# Patient Record
Sex: FEMALE | Race: ASIAN | NOT HISPANIC OR LATINO | Employment: FULL TIME | URBAN - METROPOLITAN AREA
[De-identification: names, ages, dates, MRNs, and addresses within clinical notes are randomized per-mention and may not be internally consistent; named-entity substitution may affect disease eponyms.]

---

## 2017-01-03 ENCOUNTER — TRANSFERRED RECORDS (OUTPATIENT)
Dept: HEALTH INFORMATION MANAGEMENT | Facility: CLINIC | Age: 37
End: 2017-01-03

## 2017-01-10 ENCOUNTER — OFFICE VISIT (OUTPATIENT)
Dept: FAMILY MEDICINE | Facility: CLINIC | Age: 37
End: 2017-01-10
Payer: COMMERCIAL

## 2017-01-10 VITALS
OXYGEN SATURATION: 100 % | BODY MASS INDEX: 22.13 KG/M2 | DIASTOLIC BLOOD PRESSURE: 88 MMHG | WEIGHT: 121 LBS | SYSTOLIC BLOOD PRESSURE: 133 MMHG | HEART RATE: 81 BPM | TEMPERATURE: 97 F

## 2017-01-10 DIAGNOSIS — M76.62 LEFT ACHILLES BURSITIS: Primary | ICD-10-CM

## 2017-01-10 DIAGNOSIS — K85.90 ACUTE PANCREATITIS, UNSPECIFIED COMPLICATION STATUS, UNSPECIFIED PANCREATITIS TYPE: ICD-10-CM

## 2017-01-10 DIAGNOSIS — E78.2 ELEVATED TRIGLYCERIDES WITH HIGH CHOLESTEROL: ICD-10-CM

## 2017-01-10 DIAGNOSIS — Z78.9 ALCOHOL USE: ICD-10-CM

## 2017-01-10 DIAGNOSIS — R07.81 RIB PAIN ON RIGHT SIDE: ICD-10-CM

## 2017-01-10 PROCEDURE — 99214 OFFICE O/P EST MOD 30 MIN: CPT | Performed by: PHYSICIAN ASSISTANT

## 2017-01-10 RX ORDER — OXYCODONE AND ACETAMINOPHEN 10; 325 MG/1; MG/1
1 TABLET ORAL EVERY 4 HOURS PRN
COMMUNITY
End: 2017-01-10

## 2017-01-10 RX ORDER — DICLOFENAC SODIUM 75 MG/1
75 TABLET, DELAYED RELEASE ORAL 2 TIMES DAILY PRN
Qty: 60 TABLET | Refills: 1 | Status: SHIPPED | OUTPATIENT
Start: 2017-01-10 | End: 2017-08-02

## 2017-01-10 RX ORDER — OXYCODONE AND ACETAMINOPHEN 5; 325 MG/1; MG/1
1 TABLET ORAL
Qty: 15 TABLET | Refills: 0 | Status: SHIPPED | OUTPATIENT
Start: 2017-01-10 | End: 2017-03-09

## 2017-01-10 ASSESSMENT — PAIN SCALES - GENERAL: PAINLEVEL: MILD PAIN (3)

## 2017-01-10 NOTE — Clinical Note
[facility]  [address] [facility] [address]  Phone: [phone]    January 10, 2017        Zulay Rossi  [address] [facility] [address]          To whom it may concern:    RE: Zulay Rossi    Patient was seen and treated today at our clinic. She needs to be allowed to wear a boot at all times at work until Jan. 24th if needed.     Please contact me for questions or concerns.      Sincerely,        Dania Munoz PA-C

## 2017-01-10 NOTE — PROGRESS NOTES
SUBJECTIVE:                                                    Zulay Rossi is a 36 year old female who presents to clinic today for the following health issues:    1)  Left foot achilles pain x 2 days. No plantar fascitis symptoms but had that disease on other foot previously.  Has flat arches. Does not wear inserts yet. Gets gout in her right foot infrequently.  Wears good shoes at work. has Never seen podiatry.    No known injury.  Is not a runner.   No edema or erythema. Range of motion of ankle and strength seems to be normal, just pain over achilles.   Tired wearing a boot, made it feel better.  Works as radiation therapist, walks a lot at work.   2) f/u hospital for acute pancreatitis:    [place]-25 [place] 5 days ago for hospital encounter for acute pancreatitis.  Had 4 alcoholic beverages night before and drank the night before that, this in addition to severe high TGs likely caused her first episode of pancreatitis, which patient had been warned about previously.  She also has significant fatty liver disease. She was treated in hospital and sent home and told to obstain for alcohol strictly.  She states she thinks she can do this on her own without help, she was encouraged to let me know at any time if she has difficulty.       Last TGs had improved but still over 600.  Takes fenofibrate.    Started taking more fish oil also since discharge per patient.   Daily smoker.  Was educated about quitting in hospital also.   Took diclofenac once, helped a little bit. Still having abdominal pain but slowly improving.  Feels she still needs pain medications but is out.     Mood has been okay.  Denies suicidal or homicidal thoughts.  Patient instructed to go to the emergency room or call 911 if these occur.      Has been dieting x 3 months, trying to lose weight.    BMI 22.           Problem list and histories reviewed & adjusted, as indicated.  Additional history: as documented    Patient Active Problem  List   Diagnosis     CARDIOVASCULAR SCREENING; LDL GOAL LESS THAN 160     Headache     Goiter, non-toxic     Hypertension goal BP (blood pressure) < 140/90     Acute gouty arthritis     Elevated uric acid in blood     Ovarian cyst     S/P LEEP of cervix     Tobacco abuse     Acute bronchospasm     Episode of recurrent major depressive disorder, unspecified depression episode severity (H)     Benign essential hypertension     Past Surgical History   Procedure Laterality Date     Leep tx, cervical  2003     MILI III     Hc vaginal delivery, no episiotomy/forceps  11/2000     Colonoscopy         Social History   Substance Use Topics     Smoking status: Current Every Day Smoker -- 0.50 packs/day for 15 years     Types: Cigarettes     Smokeless tobacco: Never Used     Alcohol Use: Yes      Comment: occ     Family History   Problem Relation Age of Onset     Unknown/Adopted Mother          Current Outpatient Prescriptions   Medication Sig Dispense Refill     diclofenac (VOLTAREN) 75 MG EC tablet Take 1 tablet (75 mg) by mouth 2 times daily as needed for moderate pain Take with food. For pain/inflammation. 60 tablet 1     oxyCODONE-acetaminophen (PERCOCET) 5-325 MG per tablet Take 1 tablet by mouth nightly as needed for pain 15 tablet 0     fenofibrate 160 MG tablet Take 1 tablet (160 mg) by mouth daily Note increase in dose. For high triglycerides. 30 tablet 3     metroNIDAZOLE (METROGEL) 0.75 % vaginal gel Place 1 applicator vaginally 2 times daily 70 g 0     order for DME Equipment being ordered: Nebulizer and extra tubing and mouthpieces. 1 Device 0     losartan (COZAAR) 50 MG tablet Take 1 tablet (50 mg) by mouth daily 90 tablet 1     amLODIPine (NORVASC) 10 MG tablet Take 1 tablet (10 mg) by mouth daily 90 tablet 1     montelukast (SINGULAIR) 10 MG tablet Take 1 tablet (10 mg) by mouth At Bedtime For cough/allergies 30 tablet 11     albuterol (PROAIR HFA, PROVENTIL HFA, VENTOLIN HFA) 108 (90 BASE) MCG/ACT inhaler  "Inhale 2 puffs into the lungs every 6 hours as needed for shortness of breath / dyspnea or wheezing 1 Inhaler 5     sertraline (ZOLOFT) 100 MG tablet Take 1 tablet (100 mg) by mouth daily 90 tablet 3     cyclobenzaprine (FLEXERIL) 5 MG tablet Take 1 tablet (5 mg) by mouth 3 times daily as needed for muscle spasms Avoid driving or operating machinery while on this medication- has drowsy effects. 42 tablet 0     albuterol (2.5 MG/3ML) 0.083% nebulizer solution Take 1 vial (2.5 mg) by nebulization every 4 hours as needed for shortness of breath / dyspnea or wheezing 60 vial 5     ranitidine (ZANTAC) 150 MG tablet Take  by mouth daily.       [DISCONTINUED] diclofenac (VOLTAREN) 75 MG EC tablet Take 1 tablet (75 mg) by mouth 2 times daily as needed for moderate pain Take with food 30 tablet 0     No Known Allergies    ROS:  Constitutional, HEENT, cardiovascular, pulmonary, gi and gu systems are negative, except as otherwise noted.    OBJECTIVE:                                                    /88 mmHg  Pulse 81  Temp(Src) 97  F (36.1  C) (Oral)  Wt 121 lb (54.885 kg)  SpO2 100%  Body mass index is 22.13 kg/(m^2).  GENERAL: healthy, alert and no distress  RESP: lungs clear to auscultation - no rales, rhonchi or wheezes  CV: regular rate and rhythm, normal S1 S2, no S3 or S4, no murmur, click or rub, no peripheral edema and peripheral pulses strong  ABDOMEN: soft, no rigidity, tender in general  MS: no gross musculoskeletal defects noted, no edema. Left  Achilles-no erythema or edema. Achilles is intact with richardson test. Range of motion intact at ankle. Tender left achilles.  \"feels deep per patient\". Distal sensation and pulses intact in foot.  Toes normal. Arches are flat.   NEURO: Normal strength and tone, mentation intact and speech normal  PSYCH: mentation appears normal, affect normal/bright         ASSESSMENT/PLAN:                                                    ASSESSMENT / PLAN:  (M76.62) Left " Achilles bursitis  (primary encounter diagnosis)  Comment: see below for plan  Plan: PODIATRY/FOOT & ANKLE SURGERY REFERRAL            (E78.2) Elevated triglycerides with high cholesterol  Comment:   Plan: Lipid panel reflex to direct LDL        See below    (K85.90) Acute pancreatitis, unspecified complication status, unspecified pancreatitis type  Comment: discussed she is at risk for another event, especially if she does not avoid alcohol and if we are not improving her TGS.   Plan: oxyCODONE-acetaminophen (PERCOCET) 5-325 MG per        tablet          See below                Patient Instructions   Schedule with podiatry  Wear boot as much as possible  Take diclofenac for at least 5 days or longer if needed  Avoid alcohol, please let me know at any time if this is difficult for you and I can place referrals  Lets recheck fasting labs in about 1 month, this can be lab only appointment    -Narcotic medications can be addicting and therefore are used for short term pain control only.  They are not intended for long-term use.    -Take them only for severe pain as needed.    -Never mix with alcohol.    -Do not drive after taking this medication.    -No refills without an office visit. No replacements will be given.    -Keep these medications kept in a safe location.  You are responsible for them.  -Do not give them to anyone else.  They are prescribed to you only.           Dania Munoz PA-C  [facility]

## 2017-01-10 NOTE — NURSING NOTE
"Chief Complaint   Patient presents with     Musculoskeletal Problem     left foot pain - x 2 days  (was in hosp for pancreatitis)       Initial /88 mmHg  Pulse 81  Temp(Src) 97  F (36.1  C) (Oral)  Wt 121 lb (54.885 kg)  SpO2 100% Estimated body mass index is 22.13 kg/(m^2) as calculated from the following:    Height as of 10/19/16: 5' 2\" (1.575 m).    Weight as of this encounter: 121 lb (54.885 kg)..  BP completed using cuff size: ralph Castillo M.A.      "

## 2017-01-10 NOTE — MR AVS SNAPSHOT
After Visit Summary   1/10/2017    Zulay Rossi    MRN: [id number]           Patient Information     Date Of Birth          1980        Visit Information        Provider Department      1/10/2017 11:00 AM Dania Munoz PA-C [facility]        Today's Diagnoses     Left Achilles bursitis    -  1     Elevated triglycerides with high cholesterol         Acute pancreatitis, unspecified complication status, unspecified pancreatitis type         Rib pain on right side           Care Instructions    Schedule with podiatry  Wear boot as much as possible  Take diclofenac for at least 5 days or longer if needed  Avoid alcohol, please let me know at any time if this is difficult for you and I can place referrals  Lets recheck fasting labs in about 1 month, this can be lab only appointment    -Narcotic medications can be addicting and therefore are used for short term pain control only.  They are not intended for long-term use.    -Take them only for severe pain as needed.    -Never mix with alcohol.    -Do not drive after taking this medication.    -No refills without an office visit. No replacements will be given.    -Keep these medications kept in a safe location.  You are responsible for them.  -Do not give them to anyone else.  They are prescribed to you only.           Follow-ups after your visit        Additional Services     PODIATRY/FOOT & ANKLE SURGERY REFERRAL       Your provider has referred you to: [facility]: [facility] [phone]   [email or web address]    Please be aware that coverage of these services is subject to the terms and limitations of your health insurance plan.  Call member services at your health plan with any benefit or coverage questions.      Please bring the following to your appointment:  >>   Any x-rays, CTs or MRIs which have been performed.  Contact the facility where they were done to arrange for   prior to your scheduled appointment.    >>   List of current medications   >>   This referral request   >>   Any documents/labs given to you for this referral                  Future tests that were ordered for you today     Open Future Orders        Priority Expected Expires Ordered    Lipid panel reflex to direct LDL Routine 2/10/2017 1/10/2018 1/10/2017            Who to contact     If you have questions or need follow up information about today's clinic visit or your schedule please contact [facility] directly at [phone].  Normal or non-critical lab and imaging results will be communicated to you by Huddlehart, letter or phone within 4 business days after the clinic has received the results. If you do not hear from us within 7 days, please contact the clinic through Huddlert or phone. If you have a critical or abnormal lab result, we will notify you by phone as soon as possible.  Submit refill requests through [facility] or call your pharmacy and they will forward the refill request to us. Please allow 3 business days for your refill to be completed.          Additional Information About Your Visit        Huddlehart Information     [facility] gives you secure access to your electronic health record. If you see a primary care provider, you can also send messages to your care team and make appointments. If you have questions, please call your primary care clinic.  If you do not have a primary care provider, please call [phone] and they will assist you.        Care EveryWhere ID     This is your Care EveryWhere ID. This could be used by other organizations to access your [facility] medical records  [id number]        Your Vitals Were     Pulse Temperature Pulse Oximetry             81 97  F (36.1  C) (Oral) 100%          Blood Pressure from Last 3 Encounters:   01/10/17 133/88   10/19/16 124/78   09/02/16 132/84    Weight from Last 3 Encounters:   01/10/17 121 lb (54.885 kg)   10/19/16 132 lb (59.875 kg)    09/02/16 138 lb (62.596 kg)              We Performed the Following     PODIATRY/FOOT & ANKLE SURGERY REFERRAL          Today's Medication Changes          These changes are accurate as of: 1/10/17 11:37 AM.  If you have any questions, ask your nurse or doctor.               Start taking these medicines.        Dose/Directions    diclofenac 75 MG EC tablet   Commonly known as:  VOLTAREN   Used for:  Rib pain on right side        Dose:  75 mg   Take 1 tablet (75 mg) by mouth 2 times daily as needed for moderate pain Take with food. For pain/inflammation.   Quantity:  60 tablet   Refills:  1       oxyCODONE-acetaminophen 5-325 MG per tablet   Commonly known as:  PERCOCET   Used for:  Acute pancreatitis, unspecified complication status, unspecified pancreatitis type   Replaces:  oxyCODONE-acetaminophen  MG per tablet        Dose:  1 tablet   Take 1 tablet by mouth nightly as needed for pain   Quantity:  15 tablet   Refills:  0         Stop taking these medicines if you haven't already. Please contact your care team if you have questions.     oxyCODONE-acetaminophen  MG per tablet   Commonly known as:  PERCOCET   Replaced by:  oxyCODONE-acetaminophen 5-325 MG per tablet                Where to get your medicines      These medications were sent to [facility]/pharmacy #[id number] - [place], [place] - [address] [place] [address],  AT [address], , [facility] [address]     Phone:  [phone]    - diclofenac 75 MG EC tablet      Some of these will need a paper prescription and others can be bought over the counter.  Ask your nurse if you have questions.     Bring a paper prescription for each of these medications    - oxyCODONE-acetaminophen 5-325 MG per tablet             Primary Care Provider Office Phone # Fax #    Dania Munoz PA-C [phone]       [facility] [address] [facility] [address]        Thank you!     Thank you for choosing  [facility]  for your care. Our goal is always to provide you with excellent care. Hearing back from our patients is one way we can continue to improve our services. Please take a few minutes to complete the written survey that you may receive in the mail after your visit with us. Thank you!             Your Updated Medication List - Protect others around you: Learn how to safely use, store and throw away your medicines at www.disposemymeds.org.          This list is accurate as of: 1/10/17 11:37 AM.  Always use your most recent med list.                   Brand Name Dispense Instructions for use    * albuterol (2.5 MG/3ML) 0.083% neb solution     60 vial    Take 1 vial (2.5 mg) by nebulization every 4 hours as needed for shortness of breath / dyspnea or wheezing       * albuterol 108 (90 BASE) MCG/ACT Inhaler    PROAIR HFA/PROVENTIL HFA/VENTOLIN HFA    1 Inhaler    Inhale 2 puffs into the lungs every 6 hours as needed for shortness of breath / dyspnea or wheezing       amLODIPine 10 MG tablet    NORVASC    90 tablet    Take 1 tablet (10 mg) by mouth daily       cyclobenzaprine 5 MG tablet    FLEXERIL    42 tablet    Take 1 tablet (5 mg) by mouth 3 times daily as needed for muscle spasms Avoid driving or operating machinery while on this medication- has drowsy effects.       diclofenac 75 MG EC tablet    VOLTAREN    60 tablet    Take 1 tablet (75 mg) by mouth 2 times daily as needed for moderate pain Take with food. For pain/inflammation.       fenofibrate 160 MG tablet     30 tablet    Take 1 tablet (160 mg) by mouth daily Note increase in dose. For high triglycerides.       losartan 50 MG tablet    COZAAR    90 tablet    Take 1 tablet (50 mg) by mouth daily       metroNIDAZOLE 0.75 % vaginal gel    METROGEL    70 g    Place 1 applicator vaginally 2 times daily       montelukast 10 MG tablet    SINGULAIR    30 tablet    Take 1 tablet (10 mg) by mouth At Bedtime For cough/allergies       order for DME      1 Device    Equipment being ordered: Nebulizer and extra tubing and mouthpieces.       oxyCODONE-acetaminophen 5-325 MG per tablet    PERCOCET    15 tablet    Take 1 tablet by mouth nightly as needed for pain       sertraline 100 MG tablet    ZOLOFT    90 tablet    Take 1 tablet (100 mg) by mouth daily       ZANTAC 150 MG tablet   Generic drug:  ranitidine      Take  by mouth daily.       * Notice:  This list has 2 medication(s) that are the same as other medications prescribed for you. Read the directions carefully, and ask your doctor or other care provider to review them with you.

## 2017-01-10 NOTE — PATIENT INSTRUCTIONS
Schedule with podiatry  Wear boot as much as possible  Take diclofenac for at least 5 days or longer if needed  Avoid alcohol, please let me know at any time if this is difficult for you and I can place referrals  Lets recheck fasting labs in about 1 month, this can be lab only appointment    -Narcotic medications can be addicting and therefore are used for short term pain control only.  They are not intended for long-term use.    -Take them only for severe pain as needed.    -Never mix with alcohol.    -Do not drive after taking this medication.    -No refills without an office visit. No replacements will be given.    -Keep these medications kept in a safe location.  You are responsible for them.  -Do not give them to anyone else.  They are prescribed to you only.

## 2017-01-27 ENCOUNTER — OFFICE VISIT (OUTPATIENT)
Dept: PODIATRY | Facility: CLINIC | Age: 37
End: 2017-01-27
Payer: COMMERCIAL

## 2017-01-27 VITALS — HEIGHT: 62 IN | OXYGEN SATURATION: 98 % | HEART RATE: 90 BPM

## 2017-01-27 DIAGNOSIS — M10.9 ACUTE GOUT OF LEFT ANKLE, UNSPECIFIED CAUSE: ICD-10-CM

## 2017-01-27 DIAGNOSIS — M76.62 ACHILLES TENDINITIS OF LEFT LOWER EXTREMITY: Primary | ICD-10-CM

## 2017-01-27 PROCEDURE — 99213 OFFICE O/P EST LOW 20 MIN: CPT | Performed by: PODIATRIST

## 2017-01-27 NOTE — Clinical Note
[address] [address]  Phone: [phone]    January 27, 2017        Zulay Rossi  [facility] [address] [address]          To whom it may concern:    RE: Zulay Rossi    Patient was seen and treated today at our clinic and missed work. Jan 26,27 2017.   No Work Due to Gout and tendonitis.     Patient may return to work 1/30/17 with the following:  No working or lifting restrictions    Please contact me for questions or concerns.      Sincerely,            Shiva Qiu DPM

## 2017-01-27 NOTE — NURSING NOTE
"Chief Complaint   Patient presents with     Foot Pain     Left achilles x 2weeks       Initial Pulse 90  Ht 1.575 m (5' 2\")  SpO2 98% Estimated body mass index is 22.13 kg/(m^2) as calculated from the following:    Height as of this encounter: 1.575 m (5' 2\").    Weight as of 1/10/17: 54.885 kg (121 lb).  BP completed using cuff size: NA (Not Taken)  "
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
## 2017-01-27 NOTE — PATIENT INSTRUCTIONS
Smoking Cessation    What's in cigarette smoke? - Cigarette smoke contains over 4,000 chemicals. Nicotine is one of the main ingredients which is an insecticide/herbicide. It is poisonous to our nervous system, increases blood clotting risk, and decreases the body's defenses to fight off infection. Another chemical is Carbon Monoxide is an asphyxiating gas that permanently binds to blood cells and blocks the transport of oxygen. This leads to tissue death and decreases your metabolism. Tar is a chemical that coats your lungs and trachea which impairs new oxygen coming in and carbon dioxide getting out of your body.   How does smoking impact surgery? - Smoking is particularly hazardous with regards to surgery. Surgery puts stress on the body and a smoker's body is already under strain from these chemicals. Putting the two together, especially for an elective surgery, could be a recipe for disaster. Smoking before and after surgery increases your risk of heart problems, slow wound healing, delayed bone healing, blood clots, wound infection and anesthesia complications.   What are the benefits of quitting? - In 20 minutes your blood pressure will drop back down to normal. In 8 hours the carbon monoxide (a toxic gas) levels in your blood stream will drop by half, and oxygen levels will return to normal. In 48 hours your chance of having a heart attack will have decreased. All nicotine will have left your body. Your sense of taste and smell will return to a normal level. In 72 hours your bronchial tubes will relax, and your energy levels will increase. In 2 weeks your circulation will increase, and it will continue to improve for the next 10 weeks.    Recommendations for elective surgery - Ideally, patients should quit smoking 8 weeks before and at least 2 weeks after elective surgery in order to avoid complications. Simply cutting back on the amount of cigarettes smoked per day does not offer any benefit or decrease the  risk of poor wound healing, heart problems, and infection. Smokers should also start taking Vitamin C and B for two weeks before surgery and two weeks after surgery.    Ways to Stop Smokin. Nicotine patches, lozenges, or gum  2. Support Groups  3. Medications (see below)    List of Medications:  1. Varenicline Tartrate (CHANTIX)   2. Bupropion HCL (WELLBUTRIN, ZYBAN)   note: make sure Wellbutrin is for smoking cessation and not other issues   3. Nicotine Patch (NICODERM)   4. Nicotine Inhaler (NICOTROL)   5. Nicotine Gum Nicotine Polacrilex   6. Nicotine Lozenge: Nicotine Polacrilex (COMMIT)   * [employer] offers a smoking support group as well!  Please visit: [email or web address]  If you are interested in these, ask about getting a prescription or talk to your primary care doctor about what may be the best way for you to quit.          Weight management plan: Patient was referred to their PCP to discuss a diet and exercise plan.

## 2017-02-21 ENCOUNTER — TRANSFERRED RECORDS (OUTPATIENT)
Dept: HEALTH INFORMATION MANAGEMENT | Facility: CLINIC | Age: 37
End: 2017-02-21

## 2017-03-08 NOTE — PROGRESS NOTES
SUBJECTIVE:                                                    Zulay Rossi is a 36 year old female who presents to clinic today for the following health issues:        Hospital Follow-up Visit:    Hospital/Nursing Home/IP Rehab Facility: [facility]  Date of Admission: 02/21/2017  Date of Discharge: 02/23/2017  Reason(s) for Admission: Abdominal pain per pt and was DX with pancreatitis            Problems taking medications regularly:  None       Medication changes since discharge: YES       Problems adhering to non-medication therapy:  None      Had been taking her fibrate medication, they increased it when she was discharged to 200 mg daily, she has tolerated this well. History of severe high TGs.  Has not had alcohol since her last episode of pancreatitis which was thought to also be induced from that.  However this time was not a cause.  Unknown FH because she was adopted.   Last episode of pancreatitis was this January.     Her TGs in the hospital were over 1,000 (was not fasting for this).   Was treated with liquids and pain medications.  No intervention was done.   Went back in Saturday to emergency room with similar symptoms but labs looked good then.   gastroenterology---has an appt April 12th.  At [facility].   Did have diarrhea this weekend so possibly a stomach bug on top of everything. No fevers.   Statin-has never been on.   No muscle aches from the fibrate.     Still taking pain medications, [place] registry- fills around only when she has episodes of pancreatitis. Last fill she received 20 tabs on 3-4-17.  she is aware of the risks of this medication including addiction and overdose.      Takes 1 tablet of percocet at night.   Has been on full liquid diet since her discharge.   Pain is worse at night. This helps her through night.   Still has referral for nutritionist, she will schedule.     No vomiting anymore, was vomiting a lot this weekend.  Using zofran PRN still.     Summary of  hospitalization:  CareEverywhere information obtained and reviewed  Diagnostic Tests/Treatments reviewed.  Follow up needed: none  Other Healthcare Providers Involved in Patient s Care:         None  Update since discharge: stable.     Post Discharge Medication Reconciliation: discharge medications reconciled, continue medications without change.  Plan of care communicated with patient     Coding guidelines for this visit:  Type of Medical   Decision Making Face-to-Face Visit       within 7 Days of discharge Face-to-Face Visit        within 14 days of discharge   Moderate Complexity 52026 12413   High Complexity 14693 68414            Problem list and histories reviewed & adjusted, as indicated.  Additional history: as documented    Patient Active Problem List   Diagnosis     CARDIOVASCULAR SCREENING; LDL GOAL LESS THAN 160     Headache     Goiter, non-toxic     Hypertension goal BP (blood pressure) < 140/90     Acute gouty arthritis     Elevated uric acid in blood     Ovarian cyst     S/P LEEP of cervix     Tobacco abuse     Acute bronchospasm     Episode of recurrent major depressive disorder, unspecified depression episode severity (H)     Benign essential hypertension     Past Surgical History   Procedure Laterality Date     Leep tx, cervical  2003     MILI III     Hc vaginal delivery, no episiotomy/forceps  11/2000     Colonoscopy         Social History   Substance Use Topics     Smoking status: Current Every Day Smoker     Packs/day: 0.50     Years: 15.00     Types: Cigarettes     Smokeless tobacco: Never Used     Alcohol use Yes      Comment: occ     Family History   Problem Relation Age of Onset     Unknown/Adopted Mother          Current Outpatient Prescriptions   Medication Sig Dispense Refill     potassium chloride (KLOR-CON) 20 MEQ Packet Take 20 mEq by mouth 2 times daily       oxyCODONE-acetaminophen (PERCOCET) 5-325 MG per tablet Take 1 tablet by mouth nightly as needed for pain 20 tablet 0      fenofibrate micronized (LOFIBRA) 200 MG capsule Take 1 capsule (200 mg) by mouth every morning (before breakfast) 90 capsule 1     atorvastatin (LIPITOR) 20 MG tablet Take 1 tablet (20 mg) by mouth daily 30 tablet 2     ondansetron (ZOFRAN) 8 MG tablet Take 1 tablet (8 mg) by mouth every 8 hours as needed for nausea 18 tablet 3     diclofenac (VOLTAREN) 75 MG EC tablet Take 1 tablet (75 mg) by mouth 2 times daily as needed for moderate pain Take with food. For pain/inflammation. 60 tablet 1     metroNIDAZOLE (METROGEL) 0.75 % vaginal gel Place 1 applicator vaginally 2 times daily 70 g 0     order for DME Equipment being ordered: Nebulizer and extra tubing and mouthpieces. 1 Device 0     losartan (COZAAR) 50 MG tablet Take 1 tablet (50 mg) by mouth daily 90 tablet 1     amLODIPine (NORVASC) 10 MG tablet Take 1 tablet (10 mg) by mouth daily 90 tablet 1     montelukast (SINGULAIR) 10 MG tablet Take 1 tablet (10 mg) by mouth At Bedtime For cough/allergies 30 tablet 11     albuterol (PROAIR HFA, PROVENTIL HFA, VENTOLIN HFA) 108 (90 BASE) MCG/ACT inhaler Inhale 2 puffs into the lungs every 6 hours as needed for shortness of breath / dyspnea or wheezing 1 Inhaler 5     sertraline (ZOLOFT) 100 MG tablet Take 1 tablet (100 mg) by mouth daily 90 tablet 3     cyclobenzaprine (FLEXERIL) 5 MG tablet Take 1 tablet (5 mg) by mouth 3 times daily as needed for muscle spasms Avoid driving or operating machinery while on this medication- has drowsy effects. 42 tablet 0     albuterol (2.5 MG/3ML) 0.083% nebulizer solution Take 1 vial (2.5 mg) by nebulization every 4 hours as needed for shortness of breath / dyspnea or wheezing 60 vial 5     ranitidine (ZANTAC) 150 MG tablet Take  by mouth daily.       [DISCONTINUED] fenofibrate micronized (LOFIBRA) 200 MG capsule Take 200 mg by mouth       No Known Allergies    ROS:  Constitutional, HEENT, cardiovascular, pulmonary, gi and gu systems are negative, except as otherwise  noted.    OBJECTIVE:                                                    /75  Pulse 86  Temp 98.4  F (36.9  C) (Oral)  Wt 126 lb (57.2 kg)  SpO2 100%  Breastfeeding? No  BMI 23.05 kg/m2  Body mass index is 23.05 kg/(m^2).  GENERAL: healthy, alert and no distress  RESP: lungs clear to auscultation - no rales, rhonchi or wheezes  CV: regular rate and rhythm, normal S1 S2, no S3 or S4, no murmur, click or rub, no peripheral edema and peripheral pulses strong  ABDOMEN: soft, tender epigastric region somewhat, no hepatosplenomegaly, no masses and bowel sounds normal  MS: no gross musculoskeletal defects noted, no edema  PSYCH: mentation appears normal, affect normal/bright         ASSESSMENT/PLAN:                                                    ASSESSMENT / PLAN:  (E78.1) Hypertriglyceridemia  (primary encounter diagnosis)  Comment:   Plan: atorvastatin (LIPITOR) 20 MG tablet        Continue fibrate, add lipitor, monitor for muscle aches we discussed  Recheck lipids in 2 months    (K85.90) Acute pancreatitis, unspecified complication status, unspecified pancreatitis type  Comment:   Plan: oxyCODONE-acetaminophen (PERCOCET) 5-325 MG per        tablet, fenofibrate micronized (LOFIBRA) 200 MG        capsule        Advance to normal diet slowly and as tolerated      See below for more plan, will meet with nutritionist and gastroenterology     (R11.0) Nausea  Comment:   Plan: ondansetron (ZOFRAN) 8 MG tablet            Patient Instructions   Recheck cholesterol in about 1-2 months (lab only appointment) fasting  Start statin medication  Schedule with nutritionist  Keep appt with gi  Use zofran      -Narcotic medications can be addicting and therefore are used for short term pain control only.  They are not intended for long-term use.    -Take them only for severe pain as needed.    -Never mix with alcohol.    -Do not drive after taking this medication.    -No refills without an office visit. No replacements will be  given.    -Keep these medications kept in a safe location.  You are responsible for them.  -Do not give them to anyone else.  They are prescribed to you only.           Dania Munoz PA-C  [facility]

## 2017-03-09 ENCOUNTER — OFFICE VISIT (OUTPATIENT)
Dept: FAMILY MEDICINE | Facility: CLINIC | Age: 37
End: 2017-03-09
Payer: COMMERCIAL

## 2017-03-09 VITALS
OXYGEN SATURATION: 100 % | HEART RATE: 86 BPM | BODY MASS INDEX: 23.05 KG/M2 | SYSTOLIC BLOOD PRESSURE: 119 MMHG | WEIGHT: 126 LBS | DIASTOLIC BLOOD PRESSURE: 75 MMHG | TEMPERATURE: 98.4 F

## 2017-03-09 DIAGNOSIS — E78.1 HYPERTRIGLYCERIDEMIA: Primary | ICD-10-CM

## 2017-03-09 DIAGNOSIS — R11.0 NAUSEA: ICD-10-CM

## 2017-03-09 DIAGNOSIS — K85.90 ACUTE PANCREATITIS, UNSPECIFIED COMPLICATION STATUS, UNSPECIFIED PANCREATITIS TYPE: ICD-10-CM

## 2017-03-09 PROCEDURE — 99214 OFFICE O/P EST MOD 30 MIN: CPT | Performed by: PHYSICIAN ASSISTANT

## 2017-03-09 RX ORDER — POTASSIUM CHLORIDE 1.5 G/1.58G
20 POWDER, FOR SOLUTION ORAL 2 TIMES DAILY
COMMUNITY
End: 2017-06-02

## 2017-03-09 RX ORDER — OXYCODONE AND ACETAMINOPHEN 5; 325 MG/1; MG/1
1 TABLET ORAL
Qty: 20 TABLET | Refills: 0 | Status: SHIPPED | OUTPATIENT
Start: 2017-03-09 | End: 2017-04-18

## 2017-03-09 RX ORDER — FENOFIBRATE 200 MG/1
200 CAPSULE ORAL
COMMUNITY
Start: 2017-02-23 | End: 2017-03-09

## 2017-03-09 RX ORDER — ATORVASTATIN CALCIUM 20 MG/1
20 TABLET, FILM COATED ORAL DAILY
Qty: 30 TABLET | Refills: 2 | Status: SHIPPED | OUTPATIENT
Start: 2017-03-09 | End: 2017-04-18

## 2017-03-09 RX ORDER — FENOFIBRATE 200 MG/1
200 CAPSULE ORAL
Qty: 90 CAPSULE | Refills: 1 | Status: SHIPPED | OUTPATIENT
Start: 2017-03-09 | End: 2020-10-07

## 2017-03-09 RX ORDER — ONDANSETRON 8 MG/1
TABLET, FILM COATED ORAL EVERY 8 HOURS PRN
COMMUNITY
End: 2017-03-09

## 2017-03-09 RX ORDER — ONDANSETRON 8 MG/1
8 TABLET, FILM COATED ORAL EVERY 8 HOURS PRN
Qty: 18 TABLET | Refills: 3 | Status: SHIPPED | OUTPATIENT
Start: 2017-03-09 | End: 2017-10-17

## 2017-03-09 ASSESSMENT — ANXIETY QUESTIONNAIRES
3. WORRYING TOO MUCH ABOUT DIFFERENT THINGS: NOT AT ALL
2. NOT BEING ABLE TO STOP OR CONTROL WORRYING: NOT AT ALL
1. FEELING NERVOUS, ANXIOUS, OR ON EDGE: NOT AT ALL
5. BEING SO RESTLESS THAT IT IS HARD TO SIT STILL: NOT AT ALL
IF YOU CHECKED OFF ANY PROBLEMS ON THIS QUESTIONNAIRE, HOW DIFFICULT HAVE THESE PROBLEMS MADE IT FOR YOU TO DO YOUR WORK, TAKE CARE OF THINGS AT HOME, OR GET ALONG WITH OTHER PEOPLE: NOT DIFFICULT AT ALL
GAD7 TOTAL SCORE: 0
7. FEELING AFRAID AS IF SOMETHING AWFUL MIGHT HAPPEN: NOT AT ALL
6. BECOMING EASILY ANNOYED OR IRRITABLE: NOT AT ALL

## 2017-03-09 ASSESSMENT — PATIENT HEALTH QUESTIONNAIRE - PHQ9: 5. POOR APPETITE OR OVEREATING: NOT AT ALL

## 2017-03-09 NOTE — NURSING NOTE
"Chief Complaint   Patient presents with     Hospital F/U     [facility] F/U for pancreas       Initial /75  Pulse 86  Temp 98.4  F (36.9  C) (Oral)  Wt 126 lb (57.2 kg)  SpO2 100%  Breastfeeding? No  BMI 23.05 kg/m2 Estimated body mass index is 23.05 kg/(m^2) as calculated from the following:    Height as of 1/27/17: 5' 2\" (1.575 m).    Weight as of this encounter: 126 lb (57.2 kg).  Medication Reconciliation: complete      Carlos Manuel Hoorwitz MA    "

## 2017-03-09 NOTE — PATIENT INSTRUCTIONS
Recheck cholesterol in about 1-2 months (lab only appointment) fasting  Start statin medication  Schedule with nutritionist  Keep appt with gi  Use zofran      -Narcotic medications can be addicting and therefore are used for short term pain control only.  They are not intended for long-term use.    -Take them only for severe pain as needed.    -Never mix with alcohol.    -Do not drive after taking this medication.    -No refills without an office visit. No replacements will be given.    -Keep these medications kept in a safe location.  You are responsible for them.  -Do not give them to anyone else.  They are prescribed to you only.

## 2017-03-09 NOTE — MR AVS SNAPSHOT
After Visit Summary   3/9/2017    Zulay Rossi    MRN: [id number]           Patient Information     Date Of Birth          1980        Visit Information        Provider Department      3/9/2017 1:20 PM Dania Munoz PA-C [facility]        Today's Diagnoses     Hypertriglyceridemia    -  1    Acute pancreatitis, unspecified complication status, unspecified pancreatitis type        Nausea          Care Instructions    Recheck cholesterol in about 1-2 months (lab only appointment) fasting  Start statin medication  Schedule with nutritionist  Keep appt with gi  Use zofran      -Narcotic medications can be addicting and therefore are used for short term pain control only.  They are not intended for long-term use.    -Take them only for severe pain as needed.    -Never mix with alcohol.    -Do not drive after taking this medication.    -No refills without an office visit. No replacements will be given.    -Keep these medications kept in a safe location.  You are responsible for them.  -Do not give them to anyone else.  They are prescribed to you only.           Follow-ups after your visit        Who to contact     If you have questions or need follow up information about today's clinic visit or your schedule please contact [facility] directly at [phone].  Normal or non-critical lab and imaging results will be communicated to you by MyChart, letter or phone within 4 business days after the clinic has received the results. If you do not hear from us within 7 days, please contact the clinic through MyChart or phone. If you have a critical or abnormal lab result, we will notify you by phone as soon as possible.  Submit refill requests through [employer] or call your pharmacy and they will forward the refill request to us. Please allow 3 business days for your refill to be completed.          Additional Information About Your Visit        MyChart Information      [employer] gives you secure access to your electronic health record. If you see a primary care provider, you can also send messages to your care team and make appointments. If you have questions, please call your primary care clinic.  If you do not have a primary care provider, please call [phone] and they will assist you.        Care EveryWhere ID     This is your Care EveryWhere ID. This could be used by other organizations to access your [place] medical records  [id number]        Your Vitals Were     Pulse Temperature Pulse Oximetry Breastfeeding? BMI (Body Mass Index)       86 98.4  F (36.9  C) (Oral) 100% No 23.05 kg/m2        Blood Pressure from Last 3 Encounters:   03/09/17 119/75   01/10/17 133/88   10/19/16 124/78    Weight from Last 3 Encounters:   03/09/17 126 lb (57.2 kg)   01/10/17 121 lb (54.9 kg)   10/19/16 132 lb (59.9 kg)              We Performed the Following     DEPRESSION ACTION PLAN (DAP)          Today's Medication Changes          These changes are accurate as of: 3/9/17  1:56 PM.  If you have any questions, ask your nurse or doctor.               Start taking these medicines.        Dose/Directions    atorvastatin 20 MG tablet   Commonly known as:  LIPITOR   Used for:  Hypertriglyceridemia   Started by:  Dania Munoz PA-C        Dose:  20 mg   Take 1 tablet (20 mg) by mouth daily   Quantity:  30 tablet   Refills:  2         These medicines have changed or have updated prescriptions.        Dose/Directions    fenofibrate micronized 200 MG capsule   Commonly known as:  LOFIBRA   This may have changed:  when to take this   Used for:  Acute pancreatitis, unspecified complication status, unspecified pancreatitis type   Changed by:  Dania Munoz PA-C        Dose:  200 mg   Take 1 capsule (200 mg) by mouth every morning (before breakfast)   Quantity:  90 capsule   Refills:  1       ondansetron 8 MG tablet   Commonly known as:  ZOFRAN   This may have changed:  how much  to take   Used for:  Nausea   Changed by:  Dania Munoz PA-C        Dose:  8 mg   Take 1 tablet (8 mg) by mouth every 8 hours as needed for nausea   Quantity:  18 tablet   Refills:  3         Stop taking these medicines if you haven't already. Please contact your care team if you have questions.     fenofibrate 160 MG tablet   Stopped by:  Dania Munoz PA-C           FENOFIBRATE PO   Stopped by:  Dania Munoz PA-C                Where to get your medicines      These medications were sent to [facility]/pharmacy #[id number] - [facility] [address],  AT [address] OF [address], , [facility] [address]     Phone:  [phone]     atorvastatin 20 MG tablet    fenofibrate micronized 200 MG capsule    ondansetron 8 MG tablet         Some of these will need a paper prescription and others can be bought over the counter.  Ask your nurse if you have questions.     Bring a paper prescription for each of these medications     oxyCODONE-acetaminophen 5-325 MG per tablet                Primary Care Provider Office Phone # Fax #    Dania Munzo PA-C [phone]       [address] [address]        Thank you!     Thank you for choosing [facility]  for your care. Our goal is always to provide you with excellent care. Hearing back from our patients is one way we can continue to improve our services. Please take a few minutes to complete the written survey that you may receive in the mail after your visit with us. Thank you!             Your Updated Medication List - Protect others around you: Learn how to safely use, store and throw away your medicines at www.disposemymeds.org.          This list is accurate as of: 3/9/17  1:56 PM.  Always use your most recent med list.                   Brand Name Dispense Instructions for use    * albuterol (2.5 MG/3ML) 0.083% neb solution     60  vial    Take 1 vial (2.5 mg) by nebulization every 4 hours as needed for shortness of breath / dyspnea or wheezing       * albuterol 108 (90 BASE) MCG/ACT Inhaler    PROAIR HFA/PROVENTIL HFA/VENTOLIN HFA    1 Inhaler    Inhale 2 puffs into the lungs every 6 hours as needed for shortness of breath / dyspnea or wheezing       amLODIPine 10 MG tablet    NORVASC    90 tablet    Take 1 tablet (10 mg) by mouth daily       atorvastatin 20 MG tablet    LIPITOR    30 tablet    Take 1 tablet (20 mg) by mouth daily       cyclobenzaprine 5 MG tablet    FLEXERIL    42 tablet    Take 1 tablet (5 mg) by mouth 3 times daily as needed for muscle spasms Avoid driving or operating machinery while on this medication- has drowsy effects.       diclofenac 75 MG EC tablet    VOLTAREN    60 tablet    Take 1 tablet (75 mg) by mouth 2 times daily as needed for moderate pain Take with food. For pain/inflammation.       fenofibrate micronized 200 MG capsule    LOFIBRA    90 capsule    Take 1 capsule (200 mg) by mouth every morning (before breakfast)       losartan 50 MG tablet    COZAAR    90 tablet    Take 1 tablet (50 mg) by mouth daily       metroNIDAZOLE 0.75 % vaginal gel    METROGEL    70 g    Place 1 applicator vaginally 2 times daily       montelukast 10 MG tablet    SINGULAIR    30 tablet    Take 1 tablet (10 mg) by mouth At Bedtime For cough/allergies       ondansetron 8 MG tablet    ZOFRAN    18 tablet    Take 1 tablet (8 mg) by mouth every 8 hours as needed for nausea       order for DME     1 Device    Equipment being ordered: Nebulizer and extra tubing and mouthpieces.       oxyCODONE-acetaminophen 5-325 MG per tablet    PERCOCET    20 tablet    Take 1 tablet by mouth nightly as needed for pain       potassium chloride 20 MEQ Packet    KLOR-CON     Take 20 mEq by mouth 2 times daily       sertraline 100 MG tablet    ZOLOFT    90 tablet    Take 1 tablet (100 mg) by mouth daily       ZANTAC 150 MG tablet   Generic drug:  ranitidine       Take  by mouth daily.       * Notice:  This list has 2 medication(s) that are the same as other medications prescribed for you. Read the directions carefully, and ask your doctor or other care provider to review them with you.

## 2017-03-10 ASSESSMENT — ANXIETY QUESTIONNAIRES: GAD7 TOTAL SCORE: 0

## 2017-03-10 ASSESSMENT — PATIENT HEALTH QUESTIONNAIRE - PHQ9: SUM OF ALL RESPONSES TO PHQ QUESTIONS 1-9: 0

## 2017-04-07 DIAGNOSIS — E78.2 ELEVATED TRIGLYCERIDES WITH HIGH CHOLESTEROL: ICD-10-CM

## 2017-04-07 LAB
CHOLEST SERPL-MCNC: 142 MG/DL
HDLC SERPL-MCNC: 74 MG/DL
LDLC SERPL CALC-MCNC: ABNORMAL MG/DL
LDLC SERPL DIRECT ASSAY-MCNC: 50 MG/DL
NONHDLC SERPL-MCNC: 68 MG/DL
TRIGL SERPL-MCNC: 439 MG/DL

## 2017-04-07 PROCEDURE — 36415 COLL VENOUS BLD VENIPUNCTURE: CPT | Performed by: PHYSICIAN ASSISTANT

## 2017-04-07 PROCEDURE — 80061 LIPID PANEL: CPT | Performed by: PHYSICIAN ASSISTANT

## 2017-04-07 PROCEDURE — 83721 ASSAY OF BLOOD LIPOPROTEIN: CPT | Mod: 59 | Performed by: PHYSICIAN ASSISTANT

## 2017-04-09 NOTE — PROGRESS NOTES
Efrain Biswas,       Your recent test results are attached, if you have any questions or concerns please feel free to contact me via e-mail or call [phone].        Good news, your triglycerides are coming down, 439 now.  This is still high enough to cause pancreatitis again, but going in the right direction. How are you tolerating your lipitor? It seems to be helping. Lets give it two more months and recheck again.  If still above goal, we can increase the lipitor. We will continue on fibrate.  As usual, please monitor for significant muscle aches from these medications.       Sincerely,  Dania Munoz PA-C

## 2017-04-11 ENCOUNTER — TRANSFERRED RECORDS (OUTPATIENT)
Dept: HEALTH INFORMATION MANAGEMENT | Facility: CLINIC | Age: 37
End: 2017-04-11

## 2017-04-17 ENCOUNTER — CARE COORDINATION (OUTPATIENT)
Dept: CARE COORDINATION | Facility: CLINIC | Age: 37
End: 2017-04-17

## 2017-04-17 ENCOUNTER — TELEPHONE (OUTPATIENT)
Dept: CARE COORDINATION | Facility: CLINIC | Age: 37
End: 2017-04-17

## 2017-04-17 DIAGNOSIS — K85.90 ACUTE PANCREATITIS: Primary | ICD-10-CM

## 2017-04-17 NOTE — PROGRESS NOTES
Clinic Care Coordination Contact  [facility]/Voicemail       Clinical Data: Care Coordinator Outreach  DC'd from [facility] on 4/16 to home  Primary Problem: Acute pancreatitis, unspecified  LOS: 5.3      Outreach attempted x 1.  Left message on voicemail with call back information and requested return call.     4/18/2017 1:00 PM Dania Munoz PA-C       Plan:  Care Coordinator will meet with patient while at tomorrow's post hospital follow up visit.    Stone Cline RN  Clinic Care Coordinator  [facility]  [phone]

## 2017-04-17 NOTE — TELEPHONE ENCOUNTER
DC'd from [facility] on 4/16 to home  Primary Problem: Acute pancreatitis, unspecified  LOS: 5.3

## 2017-04-18 ENCOUNTER — OFFICE VISIT (OUTPATIENT)
Dept: FAMILY MEDICINE | Facility: CLINIC | Age: 37
End: 2017-04-18
Payer: COMMERCIAL

## 2017-04-18 ENCOUNTER — TELEPHONE (OUTPATIENT)
Dept: FAMILY MEDICINE | Facility: CLINIC | Age: 37
End: 2017-04-18

## 2017-04-18 VITALS
TEMPERATURE: 98.6 F | SYSTOLIC BLOOD PRESSURE: 117 MMHG | OXYGEN SATURATION: 99 % | BODY MASS INDEX: 23.05 KG/M2 | WEIGHT: 126 LBS | DIASTOLIC BLOOD PRESSURE: 73 MMHG | HEART RATE: 95 BPM

## 2017-04-18 DIAGNOSIS — K85.00 IDIOPATHIC ACUTE PANCREATITIS, UNSPECIFIED COMPLICATION STATUS: Primary | ICD-10-CM

## 2017-04-18 DIAGNOSIS — E78.1 HYPERTRIGLYCERIDEMIA: ICD-10-CM

## 2017-04-18 DIAGNOSIS — Z79.899 CONTROLLED SUBSTANCE AGREEMENT SIGNED: ICD-10-CM

## 2017-04-18 DIAGNOSIS — G89.4 CHRONIC PAIN SYNDROME: ICD-10-CM

## 2017-04-18 PROCEDURE — 99496 TRANSJ CARE MGMT HIGH F2F 7D: CPT | Performed by: PHYSICIAN ASSISTANT

## 2017-04-18 RX ORDER — OXYCODONE HYDROCHLORIDE 5 MG/1
5-10 TABLET ORAL
COMMUNITY
Start: 2017-04-16 | End: 2017-04-24

## 2017-04-18 RX ORDER — OXYCODONE HYDROCHLORIDE 5 MG/1
TABLET ORAL
Qty: 40 TABLET | Refills: 0 | Status: SHIPPED | OUTPATIENT
Start: 2017-04-18 | End: 2017-04-24

## 2017-04-18 RX ORDER — ATORVASTATIN CALCIUM 20 MG/1
20 TABLET, FILM COATED ORAL DAILY
Qty: 30 TABLET | Refills: 2 | Status: SHIPPED | OUTPATIENT
Start: 2017-04-18 | End: 2017-11-02 | Stop reason: DRUGHIGH

## 2017-04-18 NOTE — PROGRESS NOTES
Patient rescheduled a sooner appointment for today. Writer was unaware of this and not able to meet with her. Writer will outreach in 1-2 days to discuss referrals placed.    Stone Cline RN  Clinic Care Coordinator  [facility]  [phone]

## 2017-04-18 NOTE — NURSING NOTE
"Chief Complaint   Patient presents with     Hospital F/U     [facility] F/U for pancreatitis       Initial /73  Pulse 95  Temp 98.6  F (37  C) (Oral)  Wt 126 lb (57.2 kg)  SpO2 99%  Breastfeeding? No  BMI 23.05 kg/m2 Estimated body mass index is 23.05 kg/(m^2) as calculated from the following:    Height as of 1/27/17: 5' 2\" (1.575 m).    Weight as of this encounter: 126 lb (57.2 kg).  Medication Reconciliation: complete      Carlos Manuel Horowitz MA    "

## 2017-04-18 NOTE — PROGRESS NOTES
SUBJECTIVE:                                                    Zulay Rossi is a 37 year old female who presents to clinic today for the following health issues:        Hospital Follow-up Visit:    Hospital/Nursing Home/IP Rehab Facility: [facility]  Date of Admission: 04/11/2017  Date of Discharge: 04/16/2017  Reason(s) for Admission: Pancreatitis            Problems taking medications regularly:  None       Medication changes since discharge: YES was given roxicodone and taken off of percocet       Problems adhering to non-medication therapy:  None    Summary of hospitalization:  CareEverywhere information obtained and reviewed  Diagnostic Tests/Treatments reviewed.  Follow up needed: Gi specialist  Other Healthcare Providers Involved in Patient s Care:         None  Update since discharge: improved.     Post Discharge Medication Reconciliation: discharge medications reconciled, continue medications without change.  Plan of care communicated with patient     Coding guidelines for this visit:  Type of Medical   Decision Making Face-to-Face Visit       within 7 Days of discharge Face-to-Face Visit        within 14 days of discharge   Moderate Complexity [id number]   High Complexity [id number]            Patient presents today for f/u after pancreatitis.  She has had 3 bouts of this in the past year. While in the hospital she missed her gastroenterology appointment so they had to reschedule unfortunately. She had improved TGs at our recent OV but then went out for a bingo tournament and ate poorly and had two drinks per patient.  This caused another significant episode of abdominal pain and pancreatitis. Due to hypotension she was given fluids and had subsequent third spacing of fluid and MARTIN.  She was placed in the ICU but recovered quickly and was discharged.  She was told to 100 percent avoid alcohol which we discussed again today.  She feels she does not need help with this.  She also was told to f/u  with lipid specialist (very high TGs) and will need to follow a strict diet to avoid future issues. She also needs to f/u with gastroenterology doctor as an outpatient, this has been re-scheduled for  May 12th.  She in the meantime is to continue on her statin and fibrate medication which are helping her levels overall.    No fevers.  Having BM normally, is taking stool softeners with narcotics.  Passing more gas now which feels better to her.        Abdominal Pain:  Still having pain but slowly improving. Has needed narcotics after each episode of pain, due to then number of pain medications we will make a contract today.  She has been compliant and not abuse her medications to my knowledge.  No concerns on [place] registry.  She has only typically received medications from me or hospital.  She denied illicit drug use and will be avoiding alcohol. She consents to random urine screening.   No fevers.  Having BM normally, is taking stool softeners with narcotics.  Passing more gas now which feels better to her.            Problem list and histories reviewed & adjusted, as indicated.  Additional history: as documented    Patient Active Problem List   Diagnosis     CARDIOVASCULAR SCREENING; LDL GOAL LESS THAN 160     Headache     Goiter, non-toxic     Hypertension goal BP (blood pressure) < 140/90     Acute gouty arthritis     Elevated uric acid in blood     Ovarian cyst     S/P LEEP of cervix     Tobacco abuse     Acute bronchospasm     Episode of recurrent major depressive disorder, unspecified depression episode severity (H)     Benign essential hypertension     Controlled substance agreement signed     Chronic pain syndrome     Past Surgical History:   Procedure Laterality Date     COLONOSCOPY       HC VAGINAL DELIVERY, NO EPISIOTOMY/FORCEPS  11/2000     LEEP TX, CERVICAL  2003    MILI III       Social History   Substance Use Topics     Smoking status: Current Every Day Smoker     Packs/day: 0.50     Years: 15.00      Types: Cigarettes     Smokeless tobacco: Never Used     Alcohol use Yes      Comment: occ     Family History   Problem Relation Age of Onset     Unknown/Adopted Mother          Current Outpatient Prescriptions   Medication Sig Dispense Refill     OxyCODONE HCl (ROXICODONE PO) Take 10 mg by mouth every 4 hours as needed       atorvastatin (LIPITOR) 20 MG tablet Take 1 tablet (20 mg) by mouth daily 30 tablet 2     oxyCODONE (ROXICODONE) 5 MG IR tablet Take 5-10 mg by mouth       oxyCODONE (ROXICODONE) 5 MG IR tablet Take 1-2 tablets every 4 hours, try to wean down when you can 40 tablet 0     potassium chloride (KLOR-CON) 20 MEQ Packet Take 20 mEq by mouth 2 times daily       fenofibrate micronized (LOFIBRA) 200 MG capsule Take 1 capsule (200 mg) by mouth every morning (before breakfast) 90 capsule 1     ondansetron (ZOFRAN) 8 MG tablet Take 1 tablet (8 mg) by mouth every 8 hours as needed for nausea 18 tablet 3     diclofenac (VOLTAREN) 75 MG EC tablet Take 1 tablet (75 mg) by mouth 2 times daily as needed for moderate pain Take with food. For pain/inflammation. 60 tablet 1     metroNIDAZOLE (METROGEL) 0.75 % vaginal gel Place 1 applicator vaginally 2 times daily 70 g 0     order for DME Equipment being ordered: Nebulizer and extra tubing and mouthpieces. 1 Device 0     losartan (COZAAR) 50 MG tablet Take 1 tablet (50 mg) by mouth daily 90 tablet 1     amLODIPine (NORVASC) 10 MG tablet Take 1 tablet (10 mg) by mouth daily 90 tablet 1     montelukast (SINGULAIR) 10 MG tablet Take 1 tablet (10 mg) by mouth At Bedtime For cough/allergies 30 tablet 11     albuterol (PROAIR HFA, PROVENTIL HFA, VENTOLIN HFA) 108 (90 BASE) MCG/ACT inhaler Inhale 2 puffs into the lungs every 6 hours as needed for shortness of breath / dyspnea or wheezing 1 Inhaler 5     sertraline (ZOLOFT) 100 MG tablet Take 1 tablet (100 mg) by mouth daily 90 tablet 3     cyclobenzaprine (FLEXERIL) 5 MG tablet Take 1 tablet (5 mg) by mouth 3 times daily as  needed for muscle spasms Avoid driving or operating machinery while on this medication- has drowsy effects. 42 tablet 0     albuterol (2.5 MG/3ML) 0.083% nebulizer solution Take 1 vial (2.5 mg) by nebulization every 4 hours as needed for shortness of breath / dyspnea or wheezing 60 vial 5     ranitidine (ZANTAC) 150 MG tablet Take  by mouth daily.       [DISCONTINUED] atorvastatin (LIPITOR) 20 MG tablet Take 1 tablet (20 mg) by mouth daily 30 tablet 2     No Known Allergies    ROS:  Constitutional, HEENT, cardiovascular, pulmonary, GI, , musculoskeletal, neuro, skin, endocrine and psych systems are negative, except as otherwise noted.    OBJECTIVE:                                                    /73  Pulse 95  Temp 98.6  F (37  C) (Oral)  Wt 126 lb (57.2 kg)  SpO2 99%  Breastfeeding? No  BMI 23.05 kg/m2  Body mass index is 23.05 kg/(m^2).  GENERAL: healthy, alert and no distress  RESP: lungs clear to auscultation - no rales, rhonchi or wheezes  CV: regular rate and rhythm, normal S1 S2, no S3 or S4, no murmur, click or rub, no peripheral edema and peripheral pulses strong  ABDOMEN: soft, mildly tender throughout, without hepatosplenomegaly or masses and bowel sounds normal  MS: no gross musculoskeletal defects noted, no edema  NEURO: Normal strength and tone, mentation intact and speech normal  PSYCH: mentation appears normal, affect normal/bright         ASSESSMENT/PLAN:                                                    ASSESSMENT / PLAN:  (K85.00) Idiopathic acute pancreatitis, unspecified complication status  (primary encounter diagnosis)  Comment:   Plan: oxyCODONE (ROXICODONE) 5 MG IR tablet        Do not mix with alcohol or drive after taking  Is aware of risks, we went over contract today.  She will only use as needed.  She will taper off as soon as her pain allows.       Avoid alcohol completely.  Try to avoid fatty foods.     (E78.1) Hypertriglyceridemia  Comment:   Plan: atorvastatin  (LIPITOR) 20 MG tablet, CARDIO          ADULT REFERRAL          Continue on current medications  Referred to lipid specialist    (Z79.899) Controlled substance agreement signed  Comment:   Plan: recheck every 3 months     (G89.4) Chronic pain syndrome  Comment:   Plan:  Likely will improve once pancreatitis/lipids are better controlled.  Do not expect her to need narcotics long-term.           Dania Munoz PA-C  [facility]

## 2017-04-18 NOTE — LETTER
[facility]    04/18/17    Patient: Zulay Rossi  YOB: 1980  Medical Record Number: [id number]                                                                  Controlled Substance Agreement  I understand that my care provider has prescribed controlled substances (narcotics, tranquilizers, and/or stimulants) to help manage my condition(s).  I am taking this medicine to help me function or work.  I know that this is strong medicine.  It could have serious side effects and even cause a dependency on the drug.  If I stop these medicines suddenly, I could have severe withdrawal symptoms.    The risks, benefits, and side effects of these medication(s) were explained to me.  I agree that:  1. I will take part in other treatments as advised by my provider.  This may be psychiatry or counseling, physical therapy, behavioral therapy, group treatment, or a referral to a pain clinic.  I will reduce or stop my medicine when my provider tells me to do so.   2. I will take my medicines as prescribed.  I will not change the dose or schedule unless my provider tells me to.  There will be no refills if I  run out early.   I may be contacted at any time without warning and asked to complete a drug test or pill count.   3. I will keep all my appointments at the clinic.  If I miss appointments or fail to follow instructions, my provider may stop my medicine.  4. I will not ask other providers to prescribe controlled substances. And I will not accept controlled substances from other people. If I need another prescribed controlled substance for a new reason, I will notify my provider within one business day.  5. If I enroll in the [place] Medical Marijuana program, I will tell my provider.  I will also sign an agreement to share my medical records with my provider.  6. I will use one pharmacy to fill all of my controlled substance prescriptions.  If my prescription is mailed to my pharmacy, it may take 5 to  7 days for my medicine to be ready.  7. I understand that my provider, clinic care team, and pharmacy can track controlled substance prescriptions from other providers through a central database (prescription monitoring program).  8. I will bring in my list of medications (or my medicine bottles) each time I come to the clinic.  REV- 04/2016                                                                                                                                            Page 1 of 2      [facility]    04/18/17    Patient: Zulay Rossi  YOB: 1980  Medical Record Number: [id number]    9. Refills of controlled substances will be made only during office hours.  It is up to me to make sure that I do not run out of my medicines on weekends or holidays.    10. I am responsible for my prescriptions.  If the medicine is lost or stolen, it will not be replaced.   I also agree not to share these medicines with anyone.  11. I agree to not use ANY illegal or recreational drugs.  This includes marijuana, cocaine, bath salts or other drugs.  I agree not to use alcohol unless my provider says I may.  I agree to give urine samples whenever asked.  If I fail to give a urine sample, the provider may stop my medicine.     12. I will tell my nurse or provider right away if I become pregnant or have a new medical problem treated outside of [facility].  13. I understand that this medicine can affect my thinking and judgment.  It may be unsafe for me to drive, use machinery and do dangerous tasks.  I will not do any of these things until I know how the medicine affects me.  If my dose changes, I will wait to see how it affects me.  I will contact my provider if I have concerns about medicine side effects.  I understand that if I do not follow any of the conditions above, my prescriptions or treatment may be stopped.    I agree that my provider, clinic care team, and pharmacy may work with any city,  state or federal law enforcement agency that investigates the misuse, sale, or other diversion of my controlled medicine. I will allow my provider to discuss my care with or share a copy of this agreement with any other treating provider, pharmacy or emergency room where I receive care.  I agree to give up (waive) any right of privacy or confidentiality with respect to these authorizations.   I have read this agreement and have asked questions about anything I did not understand.   ___________________________________    ___________________________  Patient Signature                                                           Date and Time  ___________________________________     ____________________________  Witness                                                                            Date and Time  ___________________________________  Dania Munoz PA-C  REV-  04/2016                                                                                                                                                                 Page 2 of 2

## 2017-04-18 NOTE — TELEPHONE ENCOUNTER
[facility] states that they received a referral but they think you want the referral to go to Lipid specialist at [facility]   --[facility] as it states on the referral.    Thank you.

## 2017-04-18 NOTE — MR AVS SNAPSHOT
After Visit Summary   4/18/2017    Zulay Rossi    MRN: [id number]           Patient Information     Date Of Birth          1980        Visit Information        Provider Department      4/18/2017 10:40 AM Dania Munoz PA-C [facility]        Today's Diagnoses     Idiopathic acute pancreatitis, unspecified complication status    -  1    Hypertriglyceridemia           Follow-ups after your visit        Additional Services     CARDIO  ADULT REFERRAL       [facility] is referring you to Cardiology Services.       The  Representative will assist you in the coordination of your Cardiology care as prescribed by your physician.    The  Representative will call you within 24 hours to help schedule your appointment, or you may contact the  Representative at: [phone].         Type of Referral: Lipid specialist at [facility]   --[facility]           Timeframe requested: as soon  As possible         Coverage of these services is subject to the terms and limitations of your health insurance plan.  Please call member services at your health plan with any benefit or coverage questions.      If X-rays, CT or MRI's have been performed, please contact the facility where they were done to arrange for , prior to your scheduled appointment.  Please bring this referral request to your appointment and present it to your specialist.                  Who to contact     If you have questions or need follow up information about today's clinic visit or your schedule please contact [facility] directly at [phone].  Normal or non-critical lab and imaging results will be communicated to you by MyChart, letter or phone within 4 business days after the clinic has received the results. If you do not hear from us within 7 days, please contact the clinic through MyChart or phone. If you have  a critical or abnormal lab result, we will notify you by phone as soon as possible.  Submit refill requests through [employer] or call your pharmacy and they will forward the refill request to us. Please allow 3 business days for your refill to be completed.          Additional Information About Your Visit        [employer] Information     [employer] gives you secure access to your electronic health record. If you see a primary care provider, you can also send messages to your care team and make appointments. If you have questions, please call your primary care clinic.  If you do not have a primary care provider, please call [phone] and they will assist you.        Care EveryWhere ID     This is your Care EveryWhere ID. This could be used by other organizations to access your [facility] medical records  [id number]        Your Vitals Were     Pulse Temperature Pulse Oximetry Breastfeeding? BMI (Body Mass Index)       95 98.6  F (37  C) (Oral) 99% No 23.05 kg/m2        Blood Pressure from Last 3 Encounters:   04/18/17 117/73   03/09/17 119/75   01/10/17 133/88    Weight from Last 3 Encounters:   04/18/17 126 lb (57.2 kg)   03/09/17 126 lb (57.2 kg)   01/10/17 121 lb (54.9 kg)              We Performed the Following     CARDIO  ADULT REFERRAL          Today's Medication Changes          These changes are accurate as of: 4/18/17 10:57 AM.  If you have any questions, ask your nurse or doctor.               These medicines have changed or have updated prescriptions.        Dose/Directions    * ROXICODONE PO   This may have changed:  Another medication with the same name was added. Make sure you understand how and when to take each.   Changed by:  Dania Munoz PA-C        Dose:  10 mg   Take 10 mg by mouth every 4 hours as needed   Refills:  0       * oxyCODONE 5 MG IR tablet   Commonly known as:  ROXICODONE   This may have changed:  Another medication with the same name was added. Make sure you  understand how and when to take each.   Changed by:  Dania Munoz PA-C        Dose:  5-10 mg   Take 5-10 mg by mouth   Refills:  0       * oxyCODONE 5 MG IR tablet   Commonly known as:  ROXICODONE   This may have changed:  You were already taking a medication with the same name, and this prescription was added. Make sure you understand how and when to take each.   Used for:  Idiopathic acute pancreatitis, unspecified complication status   Changed by:  Dania Munoz PA-C        Take 1-2 tablets every 4 hours, try to wean down when you can   Quantity:  40 tablet   Refills:  0       * Notice:  This list has 3 medication(s) that are the same as other medications prescribed for you. Read the directions carefully, and ask your doctor or other care provider to review them with you.      Stop taking these medicines if you haven't already. Please contact your care team if you have questions.     oxyCODONE-acetaminophen 5-325 MG per tablet   Commonly known as:  PERCOCET   Stopped by:  Dania Munoz PA-C                Where to get your medicines      These medications were sent to [facility]/pharmacy #[id number] - [place] [address] [facility],  [address], , [facility] [address]     Phone:  [phone]     atorvastatin 20 MG tablet         Some of these will need a paper prescription and others can be bought over the counter.  Ask your nurse if you have questions.     Bring a paper prescription for each of these medications     oxyCODONE 5 MG IR tablet                Primary Care Provider Office Phone # Fax #    Dania Munoz PA-C [phone]       [facility] [address] [facility] [address]        Thank you!     Thank you for choosing [facility]  for your care. Our goal is always to provide you with excellent care. Hearing back from our patients is one way we can continue to improve our  services. Please take a few minutes to complete the written survey that you may receive in the mail after your visit with us. Thank you!             Your Updated Medication List - Protect others around you: Learn how to safely use, store and throw away your medicines at www.disposemymeds.org.          This list is accurate as of: 4/18/17 10:57 AM.  Always use your most recent med list.                   Brand Name Dispense Instructions for use    * albuterol (2.5 MG/3ML) 0.083% neb solution     60 vial    Take 1 vial (2.5 mg) by nebulization every 4 hours as needed for shortness of breath / dyspnea or wheezing       * albuterol 108 (90 BASE) MCG/ACT Inhaler    PROAIR HFA/PROVENTIL HFA/VENTOLIN HFA    1 Inhaler    Inhale 2 puffs into the lungs every 6 hours as needed for shortness of breath / dyspnea or wheezing       amLODIPine 10 MG tablet    NORVASC    90 tablet    Take 1 tablet (10 mg) by mouth daily       atorvastatin 20 MG tablet    LIPITOR    30 tablet    Take 1 tablet (20 mg) by mouth daily       cyclobenzaprine 5 MG tablet    FLEXERIL    42 tablet    Take 1 tablet (5 mg) by mouth 3 times daily as needed for muscle spasms Avoid driving or operating machinery while on this medication- has drowsy effects.       diclofenac 75 MG EC tablet    VOLTAREN    60 tablet    Take 1 tablet (75 mg) by mouth 2 times daily as needed for moderate pain Take with food. For pain/inflammation.       fenofibrate micronized 200 MG capsule    LOFIBRA    90 capsule    Take 1 capsule (200 mg) by mouth every morning (before breakfast)       losartan 50 MG tablet    COZAAR    90 tablet    Take 1 tablet (50 mg) by mouth daily       metroNIDAZOLE 0.75 % vaginal gel    METROGEL    70 g    Place 1 applicator vaginally 2 times daily       montelukast 10 MG tablet    SINGULAIR    30 tablet    Take 1 tablet (10 mg) by mouth At Bedtime For cough/allergies       ondansetron 8 MG tablet    ZOFRAN    18 tablet    Take 1 tablet (8 mg) by mouth  every 8 hours as needed for nausea       order for DME     1 Device    Equipment being ordered: Nebulizer and extra tubing and mouthpieces.       potassium chloride 20 MEQ Packet    KLOR-CON     Take 20 mEq by mouth 2 times daily       * ROXICODONE PO      Take 10 mg by mouth every 4 hours as needed       * oxyCODONE 5 MG IR tablet    ROXICODONE     Take 5-10 mg by mouth       * oxyCODONE 5 MG IR tablet    ROXICODONE    40 tablet    Take 1-2 tablets every 4 hours, try to wean down when you can       sertraline 100 MG tablet    ZOLOFT    90 tablet    Take 1 tablet (100 mg) by mouth daily       ZANTAC 150 MG tablet   Generic drug:  ranitidine      Take  by mouth daily.       * Notice:  This list has 5 medication(s) that are the same as other medications prescribed for you. Read the directions carefully, and ask your doctor or other care provider to review them with you.

## 2017-04-20 ENCOUNTER — CARE COORDINATION (OUTPATIENT)
Dept: CARE COORDINATION | Facility: CLINIC | Age: 37
End: 2017-04-20

## 2017-04-20 NOTE — PROGRESS NOTES
"Clinic Care Coordination Contact  [facility]/Voicemail    Referral Source: [facility] ([facility])  Clinical Data:  Writer called [facility] to see if received referral for lipid specialist-\"No\". Writer faxed referral.     Care Coordinator Outreach  Outreach attempted x 1.  Left message on voicemail with call back information and requested return call. Gave her number to call and schedule cardio appointment    Plan: Care Coordinator will mail out care coordination introduction letter with care coordinator contact information and explanation of care coordination services. Care Coordinator will try to reach patient again in 3-5 business days.    Stone Cline RN  Clinic Care Coordinator  [facility]  [phone]    "

## 2017-05-02 ENCOUNTER — CARE COORDINATION (OUTPATIENT)
Dept: CARE COORDINATION | Facility: CLINIC | Age: 37
End: 2017-05-02

## 2017-05-02 ENCOUNTER — TELEPHONE (OUTPATIENT)
Dept: CARE COORDINATION | Facility: CLINIC | Age: 37
End: 2017-05-02

## 2017-05-02 DIAGNOSIS — Z76.89 HEALTH CARE HOME: ICD-10-CM

## 2017-05-02 DIAGNOSIS — R11.2 INTRACTABLE VOMITING WITH NAUSEA: Primary | ICD-10-CM

## 2017-05-02 NOTE — TELEPHONE ENCOUNTER
DC'd from [facility] on 5/1 to Home  Primary Problem: Intractable vomiting with nausea  LOS: 3.6  Readmit Risk: High

## 2017-05-02 NOTE — LETTER
Health Care Home - Access Care Plan    About Me  Patient Name:  Zulay Lopez    YOB: 1980  Age:                            37 year old   [place] MRN:         [id number] Telephone Information:     Home Phone [phone]   Mobile [phone]   Home Phone [phone]       Address:    [address] [place] [address] Email address:  [email or web address]      Emergency Contact(s)  Name Relationship Lgl Grd Work Phone Home Phone Mobile Phone   1. SLAVA LOPEZ Brother    [phone]   2. KRISTY HEATON * Other   [phone]              Health Maintenance:      My Access Plan  Medical Emergency 914   Questions or concerns during clinic hours Primary Clinic Line, I will call the clinic directly:     24 Hour Appointment Line [phone] or  [phone])  (toll free)   24 Hour Nurse Line [phone] (toll free)   Questions or concerns outside clinic hours 24 Hour Appointment Line, I will call the after-hours on-call line:   [facility] [phone] or [phone]) (toll-free)   Preferred Urgent Care     Preferred Hospital     Preferred Pharmacy [id number] - [place], [place] - [address],  [address] [facility]     Behavioral Health Crisis Line Crisis Connection, [phone] or 911     My Care Team Members  Patient Care Team       Relationship Specialty Notifications Start End    Dania Munoz PA-C PCP - General Physician Assistant  3/19/15     Phone: [phone] Fax: [phone]         [facility] [address] [facility] [address]    Dania Munoz PA-C Physician Assistant Physician Assistant  3/19/15     Phone: [phone] Fax: [phone]         [facility] [address] [facility] [address]    Stone Cline, RN Clinic Care Coordinator   5/2/17     Comment:  [facility]    Phone: [phone] Fax: [phone]            My Medical and Care Information  Problem List    Patient Active Problem List   Diagnosis     CARDIOVASCULAR SCREENING; LDL GOAL LESS THAN 160     Headache     Goiter, non-toxic     Hypertension goal BP (blood pressure) < 140/90     Acute gouty arthritis     Elevated uric acid in blood     Ovarian cyst     S/P LEEP of cervix     Tobacco abuse     Acute bronchospasm     Episode of recurrent major depressive disorder, unspecified depression episode severity (H)     Benign essential hypertension     Controlled substance agreement signed     Chronic pain syndrome     Health Care Home      Current Medications and Allergies:  See printed Medication Report

## 2017-05-02 NOTE — PROGRESS NOTES
Clinic Care Coordination Contact  OUTREACH    Referral Information:  Referral Source: [facility] ([facility])  Reason for Contact:   DC'd from [facility] on 5/1 to Home  Primary Problem: Intractable vomiting with nausea  LOS: 3.6  Readmit Risk: High         Universal Utilization:   ED Visits in last year: 4  Hospital visits in last year: 4  Last PCP appointment: 04/18/17        Multiple Providers or Specialists: Cardiology-lipids,GI    Clinical Concerns:  Current Medical Concerns: Hospitalized with N/V and dehydration. She has not had either since discharged. Writer requested that she see PCP but she has appointment with Atul Garay next week and feels a PCP visit is not needed.      Medication Management:  States that hospital told her to discontinue Norvasc and Losartan. She has done this. She is checking BP. She agreed to my chart clinic or call, or call this writer if BP elevates and  Wants to resume these. These were not removed from her med list    Upcoming appointments: 05/11/17 (GI consultation [facility])06/12/17( [facility] lipid clinic)     Plan: Writer will outreach patient week of 5/15. If GI has no thoughts may need to get into Lipid clinic sooner    Stone Cline RN  Clinic Care Coordinator  [facility]  [phone]

## 2017-05-11 ENCOUNTER — TRANSFERRED RECORDS (OUTPATIENT)
Dept: HEALTH INFORMATION MANAGEMENT | Facility: CLINIC | Age: 37
End: 2017-05-11

## 2017-05-15 ENCOUNTER — TELEPHONE (OUTPATIENT)
Dept: CARE COORDINATION | Facility: CLINIC | Age: 37
End: 2017-05-15

## 2017-05-15 DIAGNOSIS — R10.13 EPIGASTRIC PAIN: Primary | ICD-10-CM

## 2017-05-15 NOTE — PROGRESS NOTES
DC'd from [facility] on 5/12 to home  Primary Problem: epigastric pain  LOS: 0.1      Writer will contact patient in 2-3 business days.    Stone Cline RN  Clinic Care Coordinator  [place] [facility]  [phone]

## 2017-05-17 ENCOUNTER — CARE COORDINATION (OUTPATIENT)
Dept: CARE COORDINATION | Facility: CLINIC | Age: 37
End: 2017-05-17

## 2017-05-17 NOTE — PROGRESS NOTES
Clinic Care Coordination Contact  [facility]/Voicemail    Referral Source: [facility]   Clinical Data: Care Coordinator Outreach  Outreach attempted x 1.  Left message on voicemail with call back information and requested return call.  Plan: Care Coordinator will try to reach patient again in 1-2 business days (will route back to pt's primary CC to make f/u outreach).  Ronel Ernandez RN, BSN, PHN   Clinic Care Coordinator  [facility]:  [email or web address]   Office: [phone] Fax: [phone]

## 2017-05-18 ENCOUNTER — TRANSFERRED RECORDS (OUTPATIENT)
Dept: HEALTH INFORMATION MANAGEMENT | Facility: CLINIC | Age: 37
End: 2017-05-18

## 2017-05-19 LAB
ALT SERPL-CCNC: 17 IU/L (ref 8–45)
AST SERPL-CCNC: 36 IU/L (ref 2–40)
CHOLEST SERPL-MCNC: 128 MG/DL (ref 100–199)
CREAT SERPL-MCNC: 0.67 MG/DL (ref 0.57–1.11)
GFR SERPL CREATININE-BSD FRML MDRD: >60 ML/MIN/1.73M2
GLUCOSE SERPL-MCNC: 85 MG/DL (ref 65–100)
HDLC SERPL-MCNC: 58 MG/DL
LDLC SERPL CALC-MCNC: 12 MG/DL
NONHDLC SERPL-MCNC: 70 MG/DL
POTASSIUM SERPL-SCNC: 3.5 MMOL/L (ref 3.5–5)
TRIGL SERPL-MCNC: 289 MG/DL

## 2017-05-22 ENCOUNTER — TELEPHONE (OUTPATIENT)
Dept: CARE COORDINATION | Facility: CLINIC | Age: 37
End: 2017-05-22

## 2017-05-22 ENCOUNTER — CARE COORDINATION (OUTPATIENT)
Dept: CARE COORDINATION | Facility: CLINIC | Age: 37
End: 2017-05-22

## 2017-05-22 DIAGNOSIS — R10.84 ABDOMINAL PAIN, GENERALIZED: Primary | ICD-10-CM

## 2017-05-22 RX ORDER — PANCRELIPASE 24000; 76000; 120000 [USP'U]/1; [USP'U]/1; [USP'U]/1
24000 CAPSULE, DELAYED RELEASE PELLETS ORAL 4 TIMES DAILY PRN
COMMUNITY
Start: 2017-05-21 | End: 2017-11-02

## 2017-05-22 NOTE — PROGRESS NOTES
Clinic Care Coordination Contact  [facility]/Voicemail    Referral Source: [facility]  Clinical Data: Care Coordinator Outreach  DC'd from [facility] on 5/21 to Home  Primary Problem: Abdominal Pain  LOS: 2.6  Readmit Risk: High     Outreach attempted x 1.  Left message on voicemail with call back information and requested return call.  Plan: ? if went to 5[facility]. [facility]. Care Coordinator will try to reach patient again in 1-2 business days.    Stone Cline RN  Clinic Care Coordinator  [facility]  [phone]

## 2017-05-22 NOTE — PROGRESS NOTES
Clinic Care Coordination Contact  OUTREACH    Referral Information:  Referral Source: [facility]  Reason for Contact: Referral Source: [facility]  Clinical Data: Care Coordinator Outreach  DC'd from [facility] on 5/21 to Home  Primary Problem: Abdominal Pain  LOS: 2.6  Readmit Risk: High      Patient once again developed N/V and abdominal pain... To [facility] 5/19/17. Did see inpatient GI who started her on Creon and ordered Abdominal Us outpatient. She has felt well since back at home.    Went to 5/11 [facility] consultation-did not like Provider. Will have endoscopic abdominal US under general anesthesia 6/7/17 @ [facility]. Zulay plans on calling and scheduling pre op physical for some time next week.  Still has [facility] lipid clinic appointment 6/12/17.        Universal Utilization:   ED Visits in last year: 6  Hospital visits in last year: 6  Last PCP appointment: 04/18/17        Multiple Providers or Specialists: [facility] Cardiology-lipids,GI-[facility]      Medication Management:  Started on Creon 02455 units 1 capsule with meals and one snack.. Reconciled in epic       Upcoming appointments:  Pre op physical TBD in one week  6/7/17 Endoscopic Abdominal US [facility]   06/12/17 ([facility] lipid clinic)     Plan: Patient to schedule pre op physical. Writer will outreach patient week of 6/12/17    Stone Cline RN  Clinic Care Coordinator  [facility]  [phone]

## 2017-05-22 NOTE — TELEPHONE ENCOUNTER
DC'd from [facility] on 5/21 to Home  Primary Problem: Abdominal Pain  LOS: 2.6  Readmit Risk: High

## 2017-05-28 DIAGNOSIS — I10 ESSENTIAL HYPERTENSION WITH GOAL BLOOD PRESSURE LESS THAN 140/90: ICD-10-CM

## 2017-05-28 DIAGNOSIS — I10 BENIGN ESSENTIAL HYPERTENSION: ICD-10-CM

## 2017-05-30 ENCOUNTER — TRANSFERRED RECORDS (OUTPATIENT)
Dept: HEALTH INFORMATION MANAGEMENT | Facility: CLINIC | Age: 37
End: 2017-05-30

## 2017-05-30 RX ORDER — AMLODIPINE BESYLATE 10 MG/1
TABLET ORAL
Qty: 90 TABLET | Refills: 0 | Status: SHIPPED | OUTPATIENT
Start: 2017-05-30 | End: 2017-11-02

## 2017-05-30 RX ORDER — LOSARTAN POTASSIUM 50 MG/1
TABLET ORAL
Qty: 90 TABLET | Refills: 0 | Status: SHIPPED | OUTPATIENT
Start: 2017-05-30 | End: 2017-11-02
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
## 2017-05-30 NOTE — PATIENT INSTRUCTIONS
Do not take diclofenec, ibuprofen, aspirin, or naproxen 5 days before procedure  Take other medications morning of procedure with small sip of water    Before Your Surgery      Call your surgeon if there is any change in your health. This includes signs of a cold or flu (such as a sore throat, runny nose, cough, rash or fever).    Do not smoke, drink alcohol or take over the counter medicine (unless your surgeon or primary care doctor tells you to) for the 24 hours before and after surgery.    If you take prescribed drugs: Follow your doctor s orders about which medicines to take and which to stop until after surgery.    Eating and drinking prior to surgery: follow the instructions from your surgeon    Take a shower or bath the night before surgery. Use the soap your surgeon gave you to gently clean your skin. If you do not have soap from your surgeon, use your regular soap. Do not shave or scrub the surgery site.  Wear clean pajamas and have clean sheets on your bed.

## 2017-05-31 LAB — TRIGL SERPL-MCNC: 380 MG/DL

## 2017-06-01 LAB
CREAT SERPL-MCNC: 0.64 MG/DL (ref 0.57–1.11)
GFR SERPL CREATININE-BSD FRML MDRD: >60 ML/MIN/1.73M2
GLUCOSE SERPL-MCNC: 92 MG/DL (ref 65–100)
POTASSIUM SERPL-SCNC: 4.1 MMOL/L (ref 3.5–5)

## 2017-06-02 ENCOUNTER — OFFICE VISIT (OUTPATIENT)
Dept: FAMILY MEDICINE | Facility: CLINIC | Age: 37
End: 2017-06-02
Payer: COMMERCIAL

## 2017-06-02 VITALS
DIASTOLIC BLOOD PRESSURE: 82 MMHG | OXYGEN SATURATION: 100 % | WEIGHT: 121 LBS | TEMPERATURE: 98.3 F | SYSTOLIC BLOOD PRESSURE: 136 MMHG | BODY MASS INDEX: 22.13 KG/M2 | HEART RATE: 84 BPM

## 2017-06-02 DIAGNOSIS — I10 BENIGN ESSENTIAL HYPERTENSION: ICD-10-CM

## 2017-06-02 DIAGNOSIS — F39 MOOD DISORDER (H): ICD-10-CM

## 2017-06-02 DIAGNOSIS — Z01.818 PREOP GENERAL PHYSICAL EXAM: Primary | ICD-10-CM

## 2017-06-02 DIAGNOSIS — Z11.59 ENCOUNTER FOR SCREENING FOR OTHER VIRAL DISEASES: ICD-10-CM

## 2017-06-02 DIAGNOSIS — K85.00 IDIOPATHIC ACUTE PANCREATITIS, UNSPECIFIED COMPLICATION STATUS: ICD-10-CM

## 2017-06-02 LAB — B-HCG SERPL-ACNC: <1 IU/L (ref 0–5)

## 2017-06-02 PROCEDURE — 86787 VARICELLA-ZOSTER ANTIBODY: CPT | Performed by: PHYSICIAN ASSISTANT

## 2017-06-02 PROCEDURE — 36415 COLL VENOUS BLD VENIPUNCTURE: CPT | Performed by: PHYSICIAN ASSISTANT

## 2017-06-02 PROCEDURE — 99215 OFFICE O/P EST HI 40 MIN: CPT | Performed by: PHYSICIAN ASSISTANT

## 2017-06-02 PROCEDURE — 84702 CHORIONIC GONADOTROPIN TEST: CPT | Performed by: PHYSICIAN ASSISTANT

## 2017-06-02 RX ORDER — OXYCODONE HYDROCHLORIDE 5 MG/1
TABLET ORAL
Qty: 60 TABLET | Refills: 0 | Status: SHIPPED | OUTPATIENT
Start: 2017-06-02 | End: 2017-06-22

## 2017-06-02 NOTE — MR AVS SNAPSHOT
After Visit Summary   6/2/2017    Zulay Rossi    MRN: [id number]           Patient Information     Date Of Birth          1980        Visit Information        Provider Department      6/2/2017 11:00 AM Dania Munoz PA-C [facility]        Today's Diagnoses     Preop general physical exam    -  1    Recurrent pancreatitis (H)        Mood disorder (H)        Benign essential hypertension        Idiopathic acute pancreatitis, unspecified complication status        Encounter for screening for other viral diseases          Care Instructions    Do not take diclofenec, ibuprofen, aspirin, or naproxen 5 days before procedure  Take other medications morning of procedure with small sip of water    Before Your Surgery      Call your surgeon if there is any change in your health. This includes signs of a cold or flu (such as a sore throat, runny nose, cough, rash or fever).    Do not smoke, drink alcohol or take over the counter medicine (unless your surgeon or primary care doctor tells you to) for the 24 hours before and after surgery.    If you take prescribed drugs: Follow your doctor s orders about which medicines to take and which to stop until after surgery.    Eating and drinking prior to surgery: follow the instructions from your surgeon    Take a shower or bath the night before surgery. Use the soap your surgeon gave you to gently clean your skin. If you do not have soap from your surgeon, use your regular soap. Do not shave or scrub the surgery site.  Wear clean pajamas and have clean sheets on your bed.           Follow-ups after your visit        Your next 10 appointments already scheduled     Jun 06, 2017 12:20 PM CDT   Office Visit with Dania Munoz PA-C   [facility] ([facility])    [address] [facility] [address] [phone]           Bring a current list of meds and any records pertaining to this visit.  For  Physicals, please bring immunization records and any forms needing to be filled out.  Please arrive 10 minutes early to complete paperwork.              Who to contact     If you have questions or need follow up information about today's clinic visit or your schedule please contact [facility] directly at [phone].  Normal or non-critical lab and imaging results will be communicated to you by MyChart, letter or phone within 4 business days after the clinic has received the results. If you do not hear from us within 7 days, please contact the clinic through [employer] or phone. If you have a critical or abnormal lab result, we will notify you by phone as soon as possible.  Submit refill requests through [employer] or call your pharmacy and they will forward the refill request to us. Please allow 3 business days for your refill to be completed.          Additional Information About Your Visit        MyChart Information     [employer] gives you secure access to your electronic health record. If you see a primary care provider, you can also send messages to your care team and make appointments. If you have questions, please call your primary care clinic.  If you do not have a primary care provider, please call [phone] and they will assist you.        Care EveryWhere ID     This is your Care EveryWhere ID. This could be used by other organizations to access your [facility] medical records  [id number]        Your Vitals Were     Pulse Temperature Pulse Oximetry Breastfeeding? BMI (Body Mass Index)       84 98.3  F (36.8  C) (Oral) 100% No 22.13 kg/m2        Blood Pressure from Last 3 Encounters:   06/02/17 136/82   04/18/17 117/73   03/09/17 119/75    Weight from Last 3 Encounters:   06/02/17 121 lb (54.9 kg)   04/18/17 126 lb (57.2 kg)   03/09/17 126 lb (57.2 kg)              We Performed the Following     HCG quantitative pregnancy     Varicella Zoster Virus Antibody IgG          Today's Medication Changes           These changes are accurate as of: 6/2/17 11:50 AM.  If you have any questions, ask your nurse or doctor.               These medicines have changed or have updated prescriptions.        Dose/Directions    oxyCODONE 5 MG IR tablet   Commonly known as:  ROXICODONE   This may have changed:  additional instructions   Used for:  Idiopathic acute pancreatitis, unspecified complication status   Changed by:  Dania Munoz PA-C        Take 1 tablet up to every 12 hours as needed for pain.   Quantity:  60 tablet   Refills:  0         Stop taking these medicines if you haven't already. Please contact your care team if you have questions.     metroNIDAZOLE 0.75 % vaginal gel   Commonly known as:  METROGEL   Stopped by:  Daina Munoz PA-C           order for DME   Stopped by:  Dania Munoz PA-C           potassium chloride 20 MEQ Packet   Commonly known as:  KLOR-CON   Stopped by:  Dania Munoz PA-C                Where to get your medicines      Some of these will need a paper prescription and others can be bought over the counter.  Ask your nurse if you have questions.     Bring a paper prescription for each of these medications     oxyCODONE 5 MG IR tablet                Primary Care Provider Office Phone # Fax #    Dania Munoz PA-C [phone]       [address] [address]        Thank you!     Thank you for choosing [facility]  for your care. Our goal is always to provide you with excellent care. Hearing back from our patients is one way we can continue to improve our services. Please take a few minutes to complete the written survey that you may receive in the mail after your visit with us. Thank you!             Your Updated Medication List - Protect others around you: Learn how to safely use, store and throw away your medicines at www.disposemymeds.org.          This list is  accurate as of: 6/2/17 11:50 AM.  Always use your most recent med list.                   Brand Name Dispense Instructions for use    * albuterol (2.5 MG/3ML) 0.083% neb solution     60 vial    Take 1 vial (2.5 mg) by nebulization every 4 hours as needed for shortness of breath / dyspnea or wheezing       * albuterol 108 (90 BASE) MCG/ACT Inhaler    PROAIR HFA/PROVENTIL HFA/VENTOLIN HFA    1 Inhaler    Inhale 2 puffs into the lungs every 6 hours as needed for shortness of breath / dyspnea or wheezing       amLODIPine 10 MG tablet    NORVASC    90 tablet    TAKE 1 TABLET (10 MG) BY MOUTH DAILY       atorvastatin 20 MG tablet    LIPITOR    30 tablet    Take 1 tablet (20 mg) by mouth daily       CREON 59023 UNITS Cpep per EC capsule   Generic drug:  amylase-lipase-protease      Take 24,000 Units by mouth 4 times daily as needed Take with meals and one snack       cyclobenzaprine 5 MG tablet    FLEXERIL    42 tablet    Take 1 tablet (5 mg) by mouth 3 times daily as needed for muscle spasms Avoid driving or operating machinery while on this medication- has drowsy effects.       diclofenac 75 MG EC tablet    VOLTAREN    60 tablet    Take 1 tablet (75 mg) by mouth 2 times daily as needed for moderate pain Take with food. For pain/inflammation.       fenofibrate micronized 200 MG capsule    LOFIBRA    90 capsule    Take 1 capsule (200 mg) by mouth every morning (before breakfast)       losartan 50 MG tablet    COZAAR    90 tablet    TAKE 1 TABLET (50 MG) BY MOUTH DAILY       montelukast 10 MG tablet    SINGULAIR    30 tablet    Take 1 tablet (10 mg) by mouth At Bedtime For cough/allergies       ondansetron 8 MG tablet    ZOFRAN    18 tablet    Take 1 tablet (8 mg) by mouth every 8 hours as needed for nausea       oxyCODONE 5 MG IR tablet    ROXICODONE    60 tablet    Take 1 tablet up to every 12 hours as needed for pain.       sertraline 100 MG tablet    ZOLOFT    90 tablet    Take 1 tablet (100 mg) by mouth daily        ZANTAC 150 MG tablet   Generic drug:  ranitidine      Take  by mouth daily.       * Notice:  This list has 2 medication(s) that are the same as other medications prescribed for you. Read the directions carefully, and ask your doctor or other care provider to review them with you.

## 2017-06-02 NOTE — NURSING NOTE
"Chief Complaint   Patient presents with     Pre-Op Exam     Pre op surgery on 06/07/2017 Endoscopic     Hospital F/U     [facility] F/U for pancreatitis       Initial BP (!) 142/96  Pulse 84  Temp 98.3  F (36.8  C) (Oral)  Wt 121 lb (54.9 kg)  SpO2 100%  Breastfeeding? No  BMI 22.13 kg/m2 Estimated body mass index is 22.13 kg/(m^2) as calculated from the following:    Height as of 1/27/17: 5' 2\" (1.575 m).    Weight as of this encounter: 121 lb (54.9 kg).  Medication Reconciliation: complete      Carlos Manuel Horowitz MA    "

## 2017-06-02 NOTE — LETTER
[facility]  [address] [address]  Phone: [phone]    June 2, 2017        Zulay Rossi  [address] [facility] [address]          To whom it may concern:    RE: Zulay Rossi    Patient was seen and treated today at our clinic.  She can return to work on Monday with no restrictions.     Please contact me for questions or concerns.      Sincerely,        Dania Munoz PA-C

## 2017-06-02 NOTE — Clinical Note
Could you look at/sign preop sir?  This one took me 30 min too if it makes you feel better =)  Dayne

## 2017-06-04 NOTE — PROGRESS NOTES
Efrain Biswas,       Your recent test results are attached, if you have any questions or concerns please feel free to contact me via e-mail or call [phone].  Negative pregnancy test.        It was a pleasure to see you at your recent office visit.      Sincerely,  Dania Munoz PA-C

## 2017-06-05 LAB — VZV IGG SER QL IA: 1.7 AI (ref 0–0.8)

## 2017-06-05 NOTE — PROGRESS NOTES
Efrain Biswas,       Your recent test results are attached, if you have any questions or concerns please feel free to contact me via e-mail or call [phone].   Chickenpox titer is positive, you are immune.    Sincerely,  Dania Munoz PA-C

## 2017-06-13 ENCOUNTER — TRANSFERRED RECORDS (OUTPATIENT)
Dept: HEALTH INFORMATION MANAGEMENT | Facility: CLINIC | Age: 37
End: 2017-06-13

## 2017-06-15 ENCOUNTER — CARE COORDINATION (OUTPATIENT)
Dept: CARE COORDINATION | Facility: CLINIC | Age: 37
End: 2017-06-15

## 2017-06-15 NOTE — PROGRESS NOTES
Clinic Care Coordination Contact  [facility]/Voicemail    Referral Source: [facility]  Clinical Data: Care Coordinator Outreach  Had ERCP 6/7/17 at [facility]. Lipid Clinic [facility] appointment 6/12    Outreach attempted x 1.  Left message on voicemail with call back information and requested return call.  Plan: Care Coordinator will outreach in 3-4 weeks  .  Stone Cline RN  Clinic Care Coordinator  [facility]  [phone]

## 2017-06-16 ENCOUNTER — TRANSFERRED RECORDS (OUTPATIENT)
Dept: HEALTH INFORMATION MANAGEMENT | Facility: CLINIC | Age: 37
End: 2017-06-16

## 2017-06-21 ENCOUNTER — CARE COORDINATION (OUTPATIENT)
Dept: CARE COORDINATION | Facility: CLINIC | Age: 37
End: 2017-06-21

## 2017-06-21 ENCOUNTER — TELEPHONE (OUTPATIENT)
Dept: FAMILY MEDICINE | Facility: CLINIC | Age: 37
End: 2017-06-21

## 2017-06-21 NOTE — TELEPHONE ENCOUNTER
Patient is requesting a step down pain medication for her pancreatic symptoms. Currently taking oxycodone 5 mg. Would like a non narcotic. Please Advise.    Stone Cline RN  Clinic Care Coordinator  [facility]  [phone]

## 2017-06-21 NOTE — PROGRESS NOTES
Clinic Care Coordination Contact  OUTREACH    Referral Information:  Referral Source: [facility]  Clinical Data: Care Coordinator Outreach  Had ERCP 6/7/17 at [facility]. Lipid Clinic [facility] appointment 6/12. [facility] appointment 6/16        Daisy Utilization:   ED Visits in last year: 6  Hospital visits in last year: 6  Last PCP appointment: 06/02/17        Multiple Providers or Specialists: Cardiology-lipids,GI    Clinical Concerns:  Current Medical Concerns: Zulay returned my call. ERCP showed that her ducts were open-no stents placed. It did reveal signs of acute and chronic pancreatitis. Lipid clinic is recommending dietician. It could be one of theirs of [place]. Saw gastroenterologist last Friday they too have dietician available. Zulay is wondering if she should see [place] dietician. Writer recommended seeing [facility] dietician as they could focus on both lipid and pancreatitis diets. She agreed to contact them regarding this.     Medication Management:  Still taking Creon. Pain is more mild so is requesting a step down pain medication. Writer advised also calling [facility] to see if they had any pain medication recommendations.    Sent the following telephone message to Dania FULTON PA-C:  Patient is requesting a step down pain medication for her pancreatic symptoms. Currently taking oxycodone 5 mg. Would like a non narcotic. Please Advise.    Upcoming appointment:  ([facility] in 4-6 weeks) [facility] lipid clinic in 12 weeks     Plan: Will await reply from Dania FULTON PA-C will outreach patient if not done by clinic in one week.    Stone Cline RN  Clinic Care Coordinator  [facility]  [phone]

## 2017-06-22 ENCOUNTER — MYC REFILL (OUTPATIENT)
Dept: FAMILY MEDICINE | Facility: CLINIC | Age: 37
End: 2017-06-22

## 2017-06-22 DIAGNOSIS — K85.00 IDIOPATHIC ACUTE PANCREATITIS, UNSPECIFIED COMPLICATION STATUS: ICD-10-CM

## 2017-06-22 RX ORDER — OXYCODONE HYDROCHLORIDE 5 MG/1
TABLET ORAL
Qty: 20 TABLET | Refills: 0 | Status: SHIPPED | OUTPATIENT
Start: 2017-06-22 | End: 2018-01-11

## 2017-06-22 NOTE — TELEPHONE ENCOUNTER
Patient stopped by the clinic to  Rx, however I could not find the script.  Please call patient on Friday, 6/23/17 and advise. Thank you.  Shana Méndez,

## 2017-06-22 NOTE — TELEPHONE ENCOUNTER
Message from [employer]:  Original authorizing provider: KIMBERLY Sanders would like a refill of the following medications:  oxyCODONE (ROXICODONE) 5 MG IR tablet [Dania Munoz PA-C]    Preferred pharmacy: [facility]Y [id number] [address],  [address] [facility]    Comment:  Trying to ween off this medication and seeing if there is also another alternative than a narcotic to help ween off

## 2017-06-22 NOTE — TELEPHONE ENCOUNTER
Provider has signed the script.  Notified the patient by vm that her Rx is ready for  at the .  Shana Méndez,

## 2017-06-22 NOTE — TELEPHONE ENCOUNTER
See message from yesterday from care coordinator.  Barbara Overton RN      Controlled Substance Refill Request for roxicodone  Problem List Complete:  Yes  Patient is followed by Dania Munoz PA-C for ongoing prescription of pain medication.  All refills should only be approved by this provider, or covering partner.     Medication(s): Roxicodone 5 mg tabs.  Takes 1-2 every 4 hours after pancreatitis attack only.  Does not take daily every month. Takes after acute episodes of pancreatitis which are becoming more frequent.   Maximum quantity per month: 90  Clinic visit frequency required: Q 3 months      Controlled substance agreement:      Encounter-Level CSA:      There are no encounter-level csa.       Pain Clinic evaluation in the past: No     DIRE Total Score(s):  No flowsheet data found.     Last [facility] website verification:  done on 4-18-17   [email or web address]        Barbara Overton RN

## 2017-06-22 NOTE — TELEPHONE ENCOUNTER
Sent via [employer] but please try to call patient also due to my delay in getting back:    Gavino Biswas,      I will have my staff call you with this also, but I printed a new script for the narcotic medication that you can  here, that is the lowest dose so I wrote take a half tablet instead of a full tablet to help taper down.    For other non-narcotic options, I was wondering do you still have the diclofenac medication?  If so, have you taken this recently?  We can do tylenol by itself and alternate it with an NSAID like diclofenac or something similar, unless the gastroenterology doctor told you to stop all NSAIDS (ibuprofen, aleve, etc.) but typically those filter more through the kidneys so we should be okay to use within reason.     Let me know what you think, Braxton

## 2017-07-19 ENCOUNTER — TRANSFERRED RECORDS (OUTPATIENT)
Dept: HEALTH INFORMATION MANAGEMENT | Facility: CLINIC | Age: 37
End: 2017-07-19

## 2017-07-28 ENCOUNTER — CARE COORDINATION (OUTPATIENT)
Dept: CARE COORDINATION | Facility: CLINIC | Age: 37
End: 2017-07-28

## 2017-07-28 NOTE — PROGRESS NOTES
"Clinic Care Coordination Contact    Situation: Patient chart reviewed by care coordinator.    Background: Chronic vs acute pancreatitis    Assessment: Patient saw [facility] 6/[facility]. Diagnosed with chronic pancreatitis. Current abdominal symptoms improving. No med changes. Patient Mycharted 6/26: \"off narcotic meds and taking Diclofenac which is working well\"    Plan/Recommendations: Will outreach patient in 3-4 weeks    Stone Cline RN  Clinic Care Coordinator  [facility]  [phone]    "

## 2017-07-31 ENCOUNTER — TRANSFERRED RECORDS (OUTPATIENT)
Dept: HEALTH INFORMATION MANAGEMENT | Facility: CLINIC | Age: 37
End: 2017-07-31

## 2017-08-02 ENCOUNTER — CARE COORDINATION (OUTPATIENT)
Dept: CARE COORDINATION | Facility: CLINIC | Age: 37
End: 2017-08-02

## 2017-08-02 NOTE — PROGRESS NOTES
Clinic Care Coordination Contact  OUTREACH    Referral Information:  Referral Source: [facility]  Reason for Contact: [facility]  [facility] 7/31-8/2 Pancreatitis        Universal Utilization:   ED Visits in last year: 7  Hospital visits in last year: 7  Last PCP appointment: 06/06/17        Multiple Providers or Specialists: Cardiology-lipids,GI    Clinical Concerns:  Current Medical Concerns: Zulay placed call to this writer. She was discharged from [facility] today. Wondering if needs to see Dania Munoz for follow up. Advised Zulay that Dania is on FMLA and would need to see another provider   She does not feel this is necessary as has appointment in place with [facility] 8/18/17.It was recommended that she have her triglycerides checked next week. They checked these in hospital-not aware of result. Writer suggested she get these results and MYchart them along with request to have triglyceride level drawn here. She agreed to do this  Medication Management:  Diclofenac discontinued-not to be on NSAIDS. Taking Tylenol          Plan: Zulay will follow up with me as needed. Chart review in 4 weeks    Stone Cline RN  Clinic Care Coordinator  [facility] [facility]  [phone]

## 2017-08-15 ENCOUNTER — TRANSFERRED RECORDS (OUTPATIENT)
Dept: HEALTH INFORMATION MANAGEMENT | Facility: CLINIC | Age: 37
End: 2017-08-15

## 2017-08-21 ENCOUNTER — TELEPHONE (OUTPATIENT)
Dept: CARE COORDINATION | Facility: CLINIC | Age: 37
End: 2017-08-21

## 2017-08-21 ENCOUNTER — CARE COORDINATION (OUTPATIENT)
Dept: CARE COORDINATION | Facility: CLINIC | Age: 37
End: 2017-08-21

## 2017-08-21 DIAGNOSIS — K85.90 ACUTE RECURRENT PANCREATITIS: Primary | ICD-10-CM

## 2017-08-21 NOTE — TELEPHONE ENCOUNTER
DC'd from [place] on 8/18 to Home self care   Primary Problem: Acute recurrent pancreatitis  LOS: 3.0

## 2017-08-21 NOTE — PROGRESS NOTES
Clinic Care Coordination Contact  [facility]/Voicemail       Clinical Data: Care Coordinator Outreach  DC'd from [place] on 8/18 to Home self care   Primary Problem: Acute recurrent pancreatitis  LOS: 3.0    Patient was to see GI 8/18. Lipid appointment 8/31  Outreach attempted x 1.  Left message on voicemail with call back information and requested return call.  Plan:  Care Coordinator will try to reach patient again in 1-2 business days.    Stone Cline RN  Clinic Care Coordinator  [facility],[place] [facility]  [phone]

## 2017-08-24 ENCOUNTER — CARE COORDINATION (OUTPATIENT)
Dept: CARE COORDINATION | Facility: CLINIC | Age: 37
End: 2017-08-24

## 2017-08-24 ENCOUNTER — TRANSFERRED RECORDS (OUTPATIENT)
Dept: HEALTH INFORMATION MANAGEMENT | Facility: CLINIC | Age: 37
End: 2017-08-24

## 2017-08-24 NOTE — PROGRESS NOTES
Clinic Care Coordination Contact Attempt #2  [facility]/Voicemail         Clinical Data: Care Coordinator Outreach  DC'd from [place] on 8/18 to Home self care   Primary Problem: Acute recurrent pancreatitis  LOS: 3.0     Patient was to see GI 8/18. Lipid appointment 8/31  Outreach attempted x 1.  Left message on voicemail with call back information and requested return call.  Plan:  Care Coordinator will await return call     Stone Cline RN  Clinic Care Coordinator  [facility]  [phone]

## 2017-08-31 ENCOUNTER — TRANSFERRED RECORDS (OUTPATIENT)
Dept: HEALTH INFORMATION MANAGEMENT | Facility: CLINIC | Age: 37
End: 2017-08-31

## 2017-09-07 ENCOUNTER — OFFICE VISIT (OUTPATIENT)
Dept: FAMILY MEDICINE | Facility: CLINIC | Age: 37
End: 2017-09-07
Payer: COMMERCIAL

## 2017-09-07 ENCOUNTER — RADIANT APPOINTMENT (OUTPATIENT)
Dept: GENERAL RADIOLOGY | Facility: CLINIC | Age: 37
End: 2017-09-07
Attending: FAMILY MEDICINE
Payer: COMMERCIAL

## 2017-09-07 VITALS
OXYGEN SATURATION: 100 % | BODY MASS INDEX: 23.59 KG/M2 | TEMPERATURE: 98.6 F | HEART RATE: 88 BPM | WEIGHT: 129 LBS | SYSTOLIC BLOOD PRESSURE: 121 MMHG | DIASTOLIC BLOOD PRESSURE: 83 MMHG

## 2017-09-07 DIAGNOSIS — R07.81 RIB PAIN ON RIGHT SIDE: Primary | ICD-10-CM

## 2017-09-07 LAB — D DIMER PPP FEU-MCNC: 0.5 UG/ML FEU (ref 0–0.5)

## 2017-09-07 PROCEDURE — 36415 COLL VENOUS BLD VENIPUNCTURE: CPT | Performed by: FAMILY MEDICINE

## 2017-09-07 PROCEDURE — 71101 X-RAY EXAM UNILAT RIBS/CHEST: CPT | Mod: RT

## 2017-09-07 PROCEDURE — 99214 OFFICE O/P EST MOD 30 MIN: CPT | Performed by: FAMILY MEDICINE

## 2017-09-07 PROCEDURE — 85379 FIBRIN DEGRADATION QUANT: CPT | Performed by: FAMILY MEDICINE

## 2017-09-07 RX ORDER — CYCLOBENZAPRINE HCL 5 MG
TABLET ORAL
Qty: 90 TABLET | Refills: 0 | Status: SHIPPED | OUTPATIENT
Start: 2017-09-07 | End: 2017-11-02

## 2017-09-07 NOTE — NURSING NOTE
"Chief Complaint   Patient presents with     Pain     rib pain       Initial /83  Pulse 88  Temp 98.6  F (37  C) (Oral)  Wt 129 lb (58.5 kg)  BMI 23.59 kg/m2 Estimated body mass index is 23.59 kg/(m^2) as calculated from the following:    Height as of 1/27/17: 5' 2\" (1.575 m).    Weight as of this encounter: 129 lb (58.5 kg).  Medication Reconciliation: complete   Erin Lehman CMA      "

## 2017-09-07 NOTE — LETTER
[facility]  [address] [facility] [address]  Phone: [phone]    September 7, 2017        Zulay HOANG Flo  [address] [place] [address]          To whom it may concern:    RE: Zulay Rossi    Patient was seen and treated today at our clinic.  Patient may return to work 9/8/2017 with the following:  Light duty-unable to lift, push or pull more than 5 pounds    Restrictions for a week and return to work without restrictions on 9/15/2017.  However if not better by that date, needs re-evaluation as may need longer restriction.     Please contact me for questions or concerns.      Sincerely,        Marva Novoa MD

## 2017-09-07 NOTE — LETTER
[facility]  [address] [address]  Phone: [phone]    September 7, 2017        Zulay HOANG Flo  [address] [facility] [address]          To whom it may concern:    RE: Zulay Rossi    Patient was seen and treated today at our clinic.  Patient may return to work 9/8/2017 with the following:  Light duty-unable to lift, push or pull more than 5 pounds    Restrictions for a week if better in a week.  If not better in a week, needs re-evaluation as may need longer restriction.     Please contact me for questions or concerns.      Sincerely,        Marva Novoa MD

## 2017-09-07 NOTE — MR AVS SNAPSHOT
After Visit Summary   9/7/2017    Zulay Rossi    MRN: [id number]           Patient Information     Date Of Birth          1980        Visit Information        Provider Department      9/7/2017 2:00 PM Marva Novoa MD [facility]        Today's Diagnoses     Rib pain on right side    -  1    Recurrent pancreatitis (H)           Follow-ups after your visit        Who to contact     If you have questions or need follow up information about today's clinic visit or your schedule please contact [facility] directly at [phone].  Normal or non-critical lab and imaging results will be communicated to you by [employer], letter or phone within 4 business days after the clinic has received the results. If you do not hear from us within 7 days, please contact the clinic through [employer] or phone. If you have a critical or abnormal lab result, we will notify you by phone as soon as possible.  Submit refill requests through [employer] or call your pharmacy and they will forward the refill request to us. Please allow 3 business days for your refill to be completed.          Additional Information About Your Visit        MyChart Information     [employer] gives you secure access to your electronic health record. If you see a primary care provider, you can also send messages to your care team and make appointments. If you have questions, please call your primary care clinic.  If you do not have a primary care provider, please call [phone] and they will assist you.        Care EveryWhere ID     This is your Care EveryWhere ID. This could be used by other organizations to access your [place] medical records  [id number]        Your Vitals Were     Pulse Temperature Pulse Oximetry BMI (Body Mass Index)          88 98.6  F (37  C) (Oral) 100% 23.59 kg/m2         Blood Pressure from Last 3 Encounters:   09/07/17 121/83   06/02/17 136/82   04/18/17 117/73    Weight from Last 3  Encounters:   09/07/17 129 lb (58.5 kg)   06/02/17 121 lb (54.9 kg)   04/18/17 126 lb (57.2 kg)              We Performed the Following     D dimer, quantitative     XR Ribs & Chest Rt 3vw          Today's Medication Changes          These changes are accurate as of: 9/7/17  8:58 PM.  If you have any questions, ask your nurse or doctor.               These medicines have changed or have updated prescriptions.        Dose/Directions    * cyclobenzaprine 5 MG tablet   Commonly known as:  FLEXERIL   This may have changed:  Another medication with the same name was added. Make sure you understand how and when to take each.   Used for:  Rib pain on right side   Changed by:  Nikki Neri MD        Dose:  5 mg   Take 1 tablet (5 mg) by mouth 3 times daily as needed for muscle spasms Avoid driving or operating machinery while on this medication- has drowsy effects.   Quantity:  42 tablet   Refills:  0       * cyclobenzaprine 5 MG tablet   Commonly known as:  FLEXERIL   This may have changed:  You were already taking a medication with the same name, and this prescription was added. Make sure you understand how and when to take each.   Used for:  Rib pain on right side   Changed by:  Marva Novoa MD        May take 1 or 2 tablets 3 times a day as needed for muscle spasm and pain. Sedating. Preferably take at bedtime   Quantity:  90 tablet   Refills:  0       * Notice:  This list has 2 medication(s) that are the same as other medications prescribed for you. Read the directions carefully, and ask your doctor or other care provider to review them with you.         Where to get your medicines      These medications were sent to [facility]/pharmacy #[id number] - [place], [place] - [address],  [address]  [address], , [facility] [address]     Phone:  [phone]     cyclobenzaprine 5 MG tablet                Primary Care Provider Office Phone # Fax #    Dania Munoz PA-C  [phone]       [address] [address] [facility] [address]        Equal Access to Services     SAUD LR : Lizabeth christopher mason jose Sky, waaxda luqmukesh, qaybta kaalmada steven, lori mcadams. So [facility] [phone].    ATENCIÓN: Si habla español, tiene a delgado disposición servicios gratuitos de asistencia lingüística. Antoname al [phone].    We comply with applicable federal civil rights laws and [place] laws. We do not discriminate on the basis of race, color, national origin, age, disability sex, sexual orientation or gender identity.            Thank you!     Thank you for choosing [facility]  for your care. Our goal is always to provide you with excellent care. Hearing back from our patients is one way we can continue to improve our services. Please take a few minutes to complete the written survey that you may receive in the mail after your visit with us. Thank you!             Your Updated Medication List - Protect others around you: Learn how to safely use, store and throw away your medicines at www.disposemymeds.org.          This list is accurate as of: 9/7/17  8:58 PM.  Always use your most recent med list.                   Brand Name Dispense Instructions for use Diagnosis    * albuterol (2.5 MG/3ML) 0.083% neb solution     60 vial    Take 1 vial (2.5 mg) by nebulization every 4 hours as needed for shortness of breath / dyspnea or wheezing    Acute bronchitis with symptoms > 10 days       * albuterol 108 (90 BASE) MCG/ACT Inhaler    PROAIR HFA/PROVENTIL HFA/VENTOLIN HFA    1 Inhaler    Inhale 2 puffs into the lungs every 6 hours as needed for shortness of breath / dyspnea or wheezing    Acute bronchospasm       amLODIPine 10 MG tablet    NORVASC    90 tablet    TAKE 1 TABLET (10 MG) BY MOUTH DAILY    Essential hypertension with goal blood pressure less than 140/90, Benign essential hypertension       atorvastatin 20 MG tablet    LIPITOR    30  tablet    Take 1 tablet (20 mg) by mouth daily    Hypertriglyceridemia       CREON 53425 UNITS Cpep per EC capsule   Generic drug:  amylase-lipase-protease      Take 24,000 Units by mouth 4 times daily as needed Take with meals and one snack        * cyclobenzaprine 5 MG tablet    FLEXERIL    42 tablet    Take 1 tablet (5 mg) by mouth 3 times daily as needed for muscle spasms Avoid driving or operating machinery while on this medication- has drowsy effects.    Rib pain on right side       * cyclobenzaprine 5 MG tablet    FLEXERIL    90 tablet    May take 1 or 2 tablets 3 times a day as needed for muscle spasm and pain. Sedating. Preferably take at bedtime    Rib pain on right side       fenofibrate micronized 200 MG capsule    LOFIBRA    90 capsule    Take 1 capsule (200 mg) by mouth every morning (before breakfast)    Acute pancreatitis, unspecified complication status, unspecified pancreatitis type       losartan 50 MG tablet    COZAAR    90 tablet    TAKE 1 TABLET (50 MG) BY MOUTH DAILY    Essential hypertension with goal blood pressure less than 140/90, Benign essential hypertension       montelukast 10 MG tablet    SINGULAIR    30 tablet    Take 1 tablet (10 mg) by mouth At Bedtime For cough/allergies    Cough, Multiple allergies       ondansetron 8 MG tablet    ZOFRAN    18 tablet    Take 1 tablet (8 mg) by mouth every 8 hours as needed for nausea    Nausea       oxyCODONE 5 MG IR tablet    ROXICODONE    20 tablet    Take 1/2 tablet up to every 12 hours as needed for pain.    Idiopathic acute pancreatitis, unspecified complication status       sertraline 100 MG tablet    ZOLOFT    90 tablet    Take 1 tablet (100 mg) by mouth daily    Episode of recurrent major depressive disorder, unspecified depression episode severity (H)       ZANTAC 150 MG tablet   Generic drug:  ranitidine      Take  by mouth daily.        * Notice:  This list has 4 medication(s) that are the same as other medications prescribed for you.  Read the directions carefully, and ask your doctor or other care provider to review them with you.

## 2017-09-07 NOTE — PROGRESS NOTES
SUBJECTIVE:   Zulay Rossi is a 37 year old female who presents to clinic today for the following health issues:      Musculoskeletal problem/pain      Duration: X 2 weeks    Description  Location: right side rib    Intensity:  moderate    Accompanying signs and symptoms: None    History  Previous similar problem: no   Previous evaluation:  none    Precipitating or alleviating factors:  Trauma or overuse: YES- working on car 2 weeks ago, thinks may have pulled something  Aggravating factors include: moving arm, twisting    Therapies tried and outcome: Diclofenac routinely twice a day. May have been helping    Denies any history of ulcers or kidney disease or any known contraindication to NSAIDs          No thoughts of harming self or others    PERC Rule     Age <50 years   Heart rate <100 beats/minute   Oxyhemoglobin saturation ?95 percent   No hemoptysis   No estrogen use   No prior DVT or PE   No unilateral leg swelling  Positive recent hospitalization for pancreatitis. *    Was working on her car a couple of weeks ago and may have pulled a muscle or something and got better  Was trying to get a felicia bolt loose and may have pulled something  But 5 days got worse. And at work 2 days ago was really bad.  Yesterday wasn't so bad but patient sat on a heating pad the whole day  Also icing seems to help.     Anytime she moves her right arm it would pull in that muscle or if she coughs it hurts.  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   Worse with exertion relieved by rest: no  Worse with certain movements or position: YES  Associated with food intake or symptoms of GERD: no  Worse with taking in a deep breath: YES  Cough/colds or respiratory symptoms: no  Signs or symptoms of DVT no    Problem list, Medication list, Allergies, and Medical/Social/Surgical histories reviewed in [facility] and updated as appropriate.      ROS:  General: negative for fever  Resp: chest pain as above  CV: + as above  ABD:  Negative for abd pain.  Neurologic:negative for headache  10 point review of systems negative except for noted above.    OBJECTIVE:  /83  Pulse 88  Temp 98.6  F (37  C) (Oral)  Wt 129 lb (58.5 kg)  SpO2 100%  BMI 23.59 kg/m2   General:   awake, alert, and cooperative.  NAD.   Head: Normocephalic, atraumatic.  Eyes: Conjunctiva clear,   ENT: normal exam  Heart: Regular rate and rhythm. No murmur.  Lungs: Chest is clear; no wheezes or rales.   Abdomen: soft nontender  Neuro: Alert and oriented - normal speech. No gross neurologic deficits  Psych: Appropriate mood and affect. No thoughts of harming self or others   Musculoskeletal: no joint swelling. Positive right rib chest wall tenderness reproducible of pain. No calf pain or tenderness. No signs or symptoms of DVT   Increased pain with range of motion of the rib   Vascular: no cyanosis      CXR I reviewed this myself official radiology report to follow, no acute cardiorespiratory process        ASSESSMENT:    ICD-10-CM    1. Rib pain on right side R07.81 XR Ribs & Chest Rt 3vw     D dimer, quantitative     cyclobenzaprine (FLEXERIL) 5 MG tablet         PLAN:   On exam symptoms appear musculoskeletal.  Work restrictions given and follow up with primary care provider if persist  Prescribed with flexeril. Warned sedating  Sedating medications given. Aware not to drive or operate machinery while on these medications. Caution with .   Recent hospitalization for recurrent pancreatitis. Per patient doesn't feel the same. No concerns   Because of recent hospitalization d-dimer ordered for. Patient however aware may not come back immediately so if symptoms worsen go to ER.  Patient declined ER evaluation, she also feels more likely musculoskeletal so she will wait.  If with any worsening symptoms of chest pain or shortness of breath, or change in the quality of your symptoms, please proceed to ER. Recommend follow up with PCP in a few days for  re-evaluation.       Marva Novoa MD

## 2017-09-27 DIAGNOSIS — R05.9 COUGH: ICD-10-CM

## 2017-09-27 DIAGNOSIS — Z88.9 MULTIPLE ALLERGIES: ICD-10-CM

## 2017-09-27 RX ORDER — MONTELUKAST SODIUM 10 MG/1
TABLET ORAL
Qty: 30 TABLET | Refills: 4 | Status: SHIPPED | OUTPATIENT
Start: 2017-09-27 | End: 2018-04-03

## 2017-09-30 ENCOUNTER — TRANSFERRED RECORDS (OUTPATIENT)
Dept: HEALTH INFORMATION MANAGEMENT | Facility: CLINIC | Age: 37
End: 2017-09-30

## 2017-10-02 DIAGNOSIS — J98.01 ACUTE BRONCHOSPASM: ICD-10-CM

## 2017-10-04 RX ORDER — ALBUTEROL SULFATE 90 UG/1
AEROSOL, METERED RESPIRATORY (INHALATION)
Qty: 18 INHALER | Refills: 4 | Status: SHIPPED | OUTPATIENT
Start: 2017-10-04 | End: 2017-11-28

## 2017-10-06 ENCOUNTER — OFFICE VISIT (OUTPATIENT)
Dept: FAMILY MEDICINE | Facility: CLINIC | Age: 37
End: 2017-10-06
Payer: COMMERCIAL

## 2017-10-06 VITALS
HEART RATE: 96 BPM | DIASTOLIC BLOOD PRESSURE: 81 MMHG | SYSTOLIC BLOOD PRESSURE: 110 MMHG | WEIGHT: 137 LBS | HEIGHT: 62 IN | OXYGEN SATURATION: 100 % | BODY MASS INDEX: 25.21 KG/M2 | TEMPERATURE: 97.5 F

## 2017-10-06 PROCEDURE — 99213 OFFICE O/P EST LOW 20 MIN: CPT | Performed by: PHYSICIAN ASSISTANT

## 2017-10-06 RX ORDER — OXYCODONE AND ACETAMINOPHEN 5; 325 MG/1; MG/1
1 TABLET ORAL EVERY 6 HOURS PRN
Qty: 90 TABLET | Refills: 0 | Status: SHIPPED | OUTPATIENT
Start: 2017-10-06 | End: 2017-11-02

## 2017-10-06 RX ORDER — AMITRIPTYLINE HYDROCHLORIDE 10 MG/1
10 TABLET ORAL
COMMUNITY
Start: 2017-08-24 | End: 2017-11-02

## 2017-10-06 ASSESSMENT — PATIENT HEALTH QUESTIONNAIRE - PHQ9: SUM OF ALL RESPONSES TO PHQ QUESTIONS 1-9: 0

## 2017-10-06 NOTE — PROGRESS NOTES
SUBJECTIVE:   Zulay Rossi is a 37 year old female who presents to clinic today for the following health issues:          Hospital Follow-up Visit:    [facility]ing [facility]  Date of Admission: 09/30/17  Date of Discharge: 10/02/17  Reason(s) for Admission: pancreatitis            Problems taking medications regularly:  None       Medication changes since discharge: see med list       Problems adhering to non-medication therapy:  None    Summary of hospitalization:  CareEverywhere information obtained and reviewed  Diagnostic Tests/Treatments reviewed.  Follow up needed: follow up with GI for care of her recurrent pancreatitis.  Other Healthcare Providers Involved in Patient s Care:         Specialist appointment - follows with GI for care of pancreatitis.  Update since discharge: improved.     Post Discharge Medication Reconciliation: discharge medications reconciled, continue medications without change.  Plan of care communicated with patient     Coding guidelines for this visit:  Type of Medical   Decision Making Face-to-Face Visit       within 7 Days of discharge Face-to-Face Visit        within 14 days of discharge   Moderate Complexity [id number]   High Complexity [id number]          Patient was admitted for acute pancreatitis due to hypertriglyceridemia. Since her discharge, she is doing okay. She is continuing with mild pain. States this is normal and will take a few weeks to subside. Nausea and vomiting have resolved.  Denies fevers, chills, and rashes. She has 5 percocet left from her admission. She is requesting a refill. She is not taking the roxicodone. Primary care provider is out of office. Will give her enough pain medication for 1 month. She was advised to follow up with primary care provider for further refills. She understood and agreed.     Since starting the creon, she has had less flare ups of the pancreatitis. She is following with her GI specialist often for care  of her recurrent pancreatitis.     Denies any concerns or questions.         Problem list and histories reviewed & adjusted, as indicated.  Additional history: as documented    Patient Active Problem List   Diagnosis     Mood disorder (H)     CARDIOVASCULAR SCREENING; LDL GOAL LESS THAN 160     Headache     Goiter, non-toxic     Hypertension goal BP (blood pressure) < 140/90     Acute gouty arthritis     Elevated uric acid in blood     Ovarian cyst     S/P LEEP of cervix     Tobacco abuse     Acute bronchospasm     Benign essential hypertension     Controlled substance agreement signed     Chronic pain syndrome     Health Care Home     Recurrent pancreatitis (H)     Past Surgical History:   Procedure Laterality Date     BIOPSY  7/4/2013    Colonoscopy     COLONOSCOPY       HC VAGINAL DELIVERY, NO EPISIOTOMY/FORCEPS  11/2000     LEEP TX, CERVICAL  2003    MILI III     SOFT TISSUE SURGERY  5/2016    Lacerated tendon repair on right ring finger       Social History   Substance Use Topics     Smoking status: Current Every Day Smoker     Packs/day: 0.50     Years: 15.00     Types: Cigarettes     Smokeless tobacco: Never Used     Alcohol use Yes      Comment: occ     Family History   Problem Relation Age of Onset     Unknown/Adopted Mother          Current Outpatient Prescriptions   Medication Sig Dispense Refill     amitriptyline (ELAVIL) 10 MG tablet Take 10 mg by mouth       oxyCODONE-acetaminophen (PERCOCET) 5-325 MG per tablet Take 1 tablet by mouth every 6 hours as needed for pain Maximum 4 tablet(s) per day 90 tablet 0     PROAIR  (90 BASE) MCG/ACT inhaler INHALE 2 PUFFS INTO THE LUNGS EVERY 6 HOURS AS NEEDED FOR SHORTNESS OF BREATH / DYSPNEA OR WHEEZING 18 Inhaler 4     montelukast (SINGULAIR) 10 MG tablet TAKE 1 TABLET (10 MG) BY MOUTH AT BEDTIME FOR COUGH/ALLERGIES 30 tablet 4     cyclobenzaprine (FLEXERIL) 5 MG tablet May take 1 or 2 tablets 3 times a day as needed for muscle spasm and pain. Sedating.  "Preferably take at bedtime 90 tablet 0     oxyCODONE (ROXICODONE) 5 MG IR tablet Take 1/2 tablet up to every 12 hours as needed for pain. 20 tablet 0     losartan (COZAAR) 50 MG tablet TAKE 1 TABLET (50 MG) BY MOUTH DAILY 90 tablet 0     amLODIPine (NORVASC) 10 MG tablet TAKE 1 TABLET (10 MG) BY MOUTH DAILY 90 tablet 0     CREON 23084 UNITS CPEP per EC capsule Take 24,000 Units by mouth 4 times daily as needed Take with meals and one snack       atorvastatin (LIPITOR) 20 MG tablet Take 1 tablet (20 mg) by mouth daily 30 tablet 2     fenofibrate micronized (LOFIBRA) 200 MG capsule Take 1 capsule (200 mg) by mouth every morning (before breakfast) 90 capsule 1     ondansetron (ZOFRAN) 8 MG tablet Take 1 tablet (8 mg) by mouth every 8 hours as needed for nausea 18 tablet 3     sertraline (ZOLOFT) 100 MG tablet Take 1 tablet (100 mg) by mouth daily 90 tablet 3     cyclobenzaprine (FLEXERIL) 5 MG tablet Take 1 tablet (5 mg) by mouth 3 times daily as needed for muscle spasms Avoid driving or operating machinery while on this medication- has drowsy effects. 42 tablet 0     albuterol (2.5 MG/3ML) 0.083% nebulizer solution Take 1 vial (2.5 mg) by nebulization every 4 hours as needed for shortness of breath / dyspnea or wheezing 60 vial 5     ranitidine (ZANTAC) 150 MG tablet Take  by mouth daily.       No Known Allergies  BP Readings from Last 3 Encounters:   10/06/17 110/81   09/07/17 121/83   06/02/17 136/82    Wt Readings from Last 3 Encounters:   10/06/17 137 lb (62.1 kg)   09/07/17 129 lb (58.5 kg)   06/02/17 121 lb (54.9 kg)                        Reviewed and updated as needed this visit by clinical staffTobacco  Allergies  Meds       Reviewed and updated as needed this visit by Provider         ROS:  Constitutional, gi and gu systems are negative, except as otherwise noted.      OBJECTIVE:   /81  Pulse 96  Temp 97.5  F (36.4  C) (Oral)  Ht 5' 2\" (1.575 m)  Wt 137 lb (62.1 kg)  LMP 09/22/2017 " (Approximate)  SpO2 100%  BMI 25.06 kg/m2  Body mass index is 25.06 kg/(m^2).  GENERAL: healthy, alert and no distress  ABDOMEN: soft, mild epigastric/RUQ tenderness, no hepatosplenomegaly, no masses and bowel sounds normal  MS: no gross musculoskeletal defects noted, no edema  SKIN: no suspicious lesions or rashes  BACK: no CVA tenderness, no paralumbar tenderness      ASSESSMENT/PLAN:       ICD-10-CM    1. Recurrent pancreatitis (H) K86.1 oxyCODONE-acetaminophen (PERCOCET) 5-325 MG per tablet       Patient Instructions   Continue your medications as prescribed.    Follow up with GI specialist as recommended.    Follow up with primary care provider for any further refills of your pain medication.    Return to the clinic sooner if any concerns.      Nikki Dixon PA-C  [facility] [place]

## 2017-10-06 NOTE — PATIENT INSTRUCTIONS
Continue your medications as prescribed.    Follow up with GI specialist as recommended.    Follow up with primary care provider for any further refills of your pain medication.    Return to the clinic sooner if any concerns.

## 2017-10-06 NOTE — MR AVS SNAPSHOT
After Visit Summary   10/6/2017    Zulay Rossi    MRN: [id number]           Patient Information     Date Of Birth          1980        Visit Information        Provider Department      10/6/2017 11:20 AM Nikki Dixon PA-C [facility]        Today's Diagnoses     Recurrent pancreatitis (H)    -  1      Care Instructions    Continue your medications as prescribed.    Follow up with GI specialist as recommended.    Follow up with primary care provider for any further refills of your pain medication.    Return to the clinic sooner if any concerns.          Follow-ups after your visit        Who to contact     If you have questions or need follow up information about today's clinic visit or your schedule please contact [facility] directly at [phone].  Normal or non-critical lab and imaging results will be communicated to you by [employer], letter or phone within 4 business days after the clinic has received the results. If you do not hear from us within 7 days, please contact the clinic through Vdanc[employer] or phone. If you have a critical or abnormal lab result, we will notify you by phone as soon as possible.  Submit refill requests through Top Rops or call your pharmacy and they will forward the refill request to us. Please allow 3 business days for your refill to be completed.          Additional Information About Your Visit        MyChart Information     [employer] gives you secure access to your electronic health record. If you see a primary care provider, you can also send messages to your care team and make appointments. If you have questions, please call your primary care clinic.  If you do not have a primary care provider, please call [phone] and they will assist you.        Care EveryWhere ID     This is your Care EveryWhere ID. This could be used by other organizations to access your [place] medical records  [id number]        Your Vitals Were      "Pulse Temperature Height Last Period Pulse Oximetry BMI (Body Mass Index)    96 97.5  F (36.4  C) (Oral) 5' 2\" (1.575 m) 09/22/2017 (Approximate) 100% 25.06 kg/m2       Blood Pressure from Last 3 Encounters:   10/06/17 110/81   09/07/17 121/83   06/02/17 136/82    Weight from Last 3 Encounters:   10/06/17 137 lb (62.1 kg)   09/07/17 129 lb (58.5 kg)   06/02/17 121 lb (54.9 kg)              Today, you had the following     No orders found for display         Today's Medication Changes          These changes are accurate as of: 10/6/17 11:43 AM.  If you have any questions, ask your nurse or doctor.               Start taking these medicines.        Dose/Directions    oxyCODONE-acetaminophen 5-325 MG per tablet   Commonly known as:  PERCOCET   Used for:  Recurrent pancreatitis (H)   Started by:  Nikki Dixon PA-C        Dose:  1 tablet   Take 1 tablet by mouth every 6 hours as needed for pain Maximum 4 tablet(s) per day   Quantity:  90 tablet   Refills:  0            Where to get your medicines      Some of these will need a paper prescription and others can be bought over the counter.  Ask your nurse if you have questions.     Bring a paper prescription for each of these medications     oxyCODONE-acetaminophen 5-325 MG per tablet                Primary Care Provider Office Phone # Fax #    Dania Munoz PA-C [phone] [address] [place] [address]        Equal Access to Services     [facility] BE AH: Lizabeth blacko Sodelores, waaxda luqadaha, qaybta kaalmada adeegyada, waxay moraima mcadams. So [facility] [place] [phone].    ATENCIÓN: Si habla español, tiene a delgado disposición servicios gratuitos de asistencia lingüística. Llame al [phone].    We comply with applicable federal civil rights laws and [place] laws. We do not discriminate on the basis of race, color, national origin, age, disability, sex, sexual orientation, or gender identity.       "      Thank you!     Thank you for choosing [facility]  for your care. Our goal is always to provide you with excellent care. Hearing back from our patients is one way we can continue to improve our services. Please take a few minutes to complete the written survey that you may receive in the mail after your visit with us. Thank you!             Your Updated Medication List - Protect others around you: Learn how to safely use, store and throw away your medicines at www.disposemymeds.org.          This list is accurate as of: 10/6/17 11:43 AM.  Always use your most recent med list.                   Brand Name Dispense Instructions for use Diagnosis    * albuterol (2.5 MG/3ML) 0.083% neb solution     60 vial    Take 1 vial (2.5 mg) by nebulization every 4 hours as needed for shortness of breath / dyspnea or wheezing    Acute bronchitis with symptoms > 10 days       * PROAIR  (90 BASE) MCG/ACT Inhaler   Generic drug:  albuterol     18 Inhaler    INHALE 2 PUFFS INTO THE LUNGS EVERY 6 HOURS AS NEEDED FOR SHORTNESS OF BREATH / DYSPNEA OR WHEEZING    Acute bronchospasm       amitriptyline 10 MG tablet    ELAVIL     Take 10 mg by mouth        amLODIPine 10 MG tablet    NORVASC    90 tablet    TAKE 1 TABLET (10 MG) BY MOUTH DAILY    Essential hypertension with goal blood pressure less than 140/90, Benign essential hypertension       atorvastatin 20 MG tablet    LIPITOR    30 tablet    Take 1 tablet (20 mg) by mouth daily    Hypertriglyceridemia       CREON 51343-59008 UNITS Cpep per EC capsule   Generic drug:  amylase-lipase-protease      Take 24,000 Units by mouth 4 times daily as needed Take with meals and one snack        * cyclobenzaprine 5 MG tablet    FLEXERIL    42 tablet    Take 1 tablet (5 mg) by mouth 3 times daily as needed for muscle spasms Avoid driving or operating machinery while on this medication- has drowsy effects.    Rib pain on right side       * cyclobenzaprine 5 MG tablet     FLEXERIL    90 tablet    May take 1 or 2 tablets 3 times a day as needed for muscle spasm and pain. Sedating. Preferably take at bedtime    Rib pain on right side       fenofibrate micronized 200 MG capsule    LOFIBRA    90 capsule    Take 1 capsule (200 mg) by mouth every morning (before breakfast)    Acute pancreatitis, unspecified complication status, unspecified pancreatitis type       losartan 50 MG tablet    COZAAR    90 tablet    TAKE 1 TABLET (50 MG) BY MOUTH DAILY    Essential hypertension with goal blood pressure less than 140/90, Benign essential hypertension       montelukast 10 MG tablet    SINGULAIR    30 tablet    TAKE 1 TABLET (10 MG) BY MOUTH AT BEDTIME FOR COUGH/ALLERGIES    Cough, Multiple allergies       ondansetron 8 MG tablet    ZOFRAN    18 tablet    Take 1 tablet (8 mg) by mouth every 8 hours as needed for nausea    Nausea       oxyCODONE 5 MG IR tablet    ROXICODONE    20 tablet    Take 1/2 tablet up to every 12 hours as needed for pain.    Idiopathic acute pancreatitis, unspecified complication status       oxyCODONE-acetaminophen 5-325 MG per tablet    PERCOCET    90 tablet    Take 1 tablet by mouth every 6 hours as needed for pain Maximum 4 tablet(s) per day    Recurrent pancreatitis (H)       sertraline 100 MG tablet    ZOLOFT    90 tablet    Take 1 tablet (100 mg) by mouth daily    Episode of recurrent major depressive disorder, unspecified depression episode severity (H)       ZANTAC 150 MG tablet   Generic drug:  ranitidine      Take  by mouth daily.        * Notice:  This list has 4 medication(s) that are the same as other medications prescribed for you. Read the directions carefully, and ask your doctor or other care provider to review them with you.

## 2017-10-06 NOTE — NURSING NOTE
"Chief Complaint   Patient presents with     Hospital F/U       Initial /81  Pulse 96  Temp 97.5  F (36.4  C) (Oral)  Ht 5' 2\" (1.575 m)  Wt 137 lb (62.1 kg)  LMP 09/22/2017 (Approximate)  SpO2 100%  BMI 25.06 kg/m2 Estimated body mass index is 25.06 kg/(m^2) as calculated from the following:    Height as of this encounter: 5' 2\" (1.575 m).    Weight as of this encounter: 137 lb (62.1 kg).  Medication Reconciliation: complete  "

## 2017-10-09 ENCOUNTER — OFFICE VISIT (OUTPATIENT)
Dept: FAMILY MEDICINE | Facility: CLINIC | Age: 37
End: 2017-10-09
Payer: COMMERCIAL

## 2017-10-09 VITALS
DIASTOLIC BLOOD PRESSURE: 76 MMHG | BODY MASS INDEX: 25.24 KG/M2 | OXYGEN SATURATION: 99 % | TEMPERATURE: 97 F | HEART RATE: 105 BPM | WEIGHT: 138 LBS | SYSTOLIC BLOOD PRESSURE: 130 MMHG

## 2017-10-09 DIAGNOSIS — T23.262S: ICD-10-CM

## 2017-10-09 DIAGNOSIS — L03.114 CELLULITIS OF HAND, LEFT: Primary | ICD-10-CM

## 2017-10-09 PROCEDURE — 99213 OFFICE O/P EST LOW 20 MIN: CPT | Performed by: FAMILY MEDICINE

## 2017-10-09 RX ORDER — CEPHALEXIN 500 MG/1
500 CAPSULE ORAL 2 TIMES DAILY
Qty: 20 CAPSULE | Refills: 0 | Status: SHIPPED | OUTPATIENT
Start: 2017-10-09 | End: 2017-10-19

## 2017-10-09 NOTE — MR AVS SNAPSHOT
After Visit Summary   10/9/2017    Zulay Rossi    MRN: [id number]           Patient Information     Date Of Birth          1980        Visit Information        Provider Department      10/9/2017 2:40 PM Kaylie Gamez MD [facility]        Today's Diagnoses     Cellulitis of hand, left    -  1    Burn of second degree of back of left hand, sequela          Care Instructions    Based on your medical history and these are the current health maintenance or preventive care services that you are due for (some may have been done at this visit)  Health Maintenance Due   Topic Date Due     URINE DRUG SCREEN Q1 YR  03/14/1996         At [facility], we strive to deliver an exceptional experience to you, every time we see you.    If you receive a survey in the mail, please send us back your thoughts. We really do value your feedback.    Your care team's suggested websites for health information:  Www.[employer] : Up to date and easily searchable information on multiple topics.  Www.medlineplus.gov : medication info, interactive tutorials, watch real surgeries online  Www.familydoctor.org : good info from the Academy of Family Physicians  Www.cdc.gov : public health info, travel advisories, epidemics (H1N1)  Www.aap.org : children's health info, normal development, vaccinations  Www.health.[facility].[place].us : [place] dept of health, public health issues in [place], N1N1    How to contact your care team:   Team Viridiana/Spirit [phone]         Pharmacy [phone]    Dr. Levin, Ladi Gunter PA-C, Indira Colvin APRN CNP, Berta Agrawal PA-C, Dr. Morrell, and JONATHAN Rivera CNP    Team RN: Amina      Clinic hours  M-Th 7 am-7 pm   Fri 7 am-5 pm.   Urgent care M-F 11 am-9 pm,   Sat/Sun 9 am-5 pm.  Pharmacy M-Th 8 am-8 pm Fri 8 am-6 pm  Sat/Sun 9 am-5 pm.     All password changes, disabled accounts, or ID changes in [employer]MyHTuicoolth  will be done by our Access Services Department.    If you need help with your account or password, call: [phone]. Clinic staff no longer has the ability to change passwords.             Follow-ups after your visit        Who to contact     If you have questions or need follow up information about today's clinic visit or your schedule please contact [facility] directly at [phone].  Normal or non-critical lab and imaging results will be communicated to you by [employer], letter or phone within 4 business days after the clinic has received the results. If you do not hear from us within 7 days, please contact the clinic through Asanti or phone. If you have a critical or abnormal lab result, we will notify you by phone as soon as possible.  Submit refill requests through [employer] or call your pharmacy and they will forward the refill request to us. Please allow 3 business days for your refill to be completed.          Additional Information About Your Visit        Asanti Information     [employer] gives you secure access to your electronic health record. If you see a primary care provider, you can also send messages to your care team and make appointments. If you have questions, please call your primary care clinic.  If you do not have a primary care provider, please call [phone] and they will assist you.        Care EveryWhere ID     This is your Care EveryWhere ID. This could be used by other organizations to access your [place] medical records  [id number]        Your Vitals Were     Pulse Temperature Last Period Pulse Oximetry Breastfeeding? BMI (Body Mass Index)    105 97  F (36.1  C) (Oral) 09/22/2017 (Approximate) 99% No 25.24 kg/m2       Blood Pressure from Last 3 Encounters:   10/09/17 130/76   10/06/17 110/81   09/07/17 121/83    Weight from Last 3 Encounters:   10/09/17 138 lb (62.6 kg)   10/06/17 137 lb (62.1 kg)   09/07/17 129 lb (58.5 kg)              Today, you had the  following     No orders found for display         Today's Medication Changes          These changes are accurate as of: 10/9/17  2:51 PM.  If you have any questions, ask your nurse or doctor.               Start taking these medicines.        Dose/Directions    cephALEXin 500 MG capsule   Commonly known as:  KEFLEX   Used for:  Cellulitis of hand, left   Started by:  Kaylie Gamez MD        Dose:  500 mg   Take 1 capsule (500 mg) by mouth 2 times daily for 10 days   Quantity:  20 capsule   Refills:  0            Where to get your medicines      These medications were sent to [facility]/pharmacy #[id number] - [place], [place] - [address]  AT [address] [facility]  [address] , [facility] [address]     Phone:  [phone]     cephALEXin 500 MG capsule                Primary Care Provider Office Phone # Fax #    Dania Munoz PA-C [phone] [address] [facility] [address]        Equal Access to Services     CARLO LR : Hadii aad ku hadasho Soomaali, waaxda luqadaha, qaybta kaalmada adeegyada, waxay idiin hayaan taco irwin . So [facility] [phone].    ATENCIÓN: Si habla español, tiene a delgado disposición servicios gratuitos de asistencia lingüística. [facility] [phone].    We comply with applicable federal civil rights laws and [place] laws. We do not discriminate on the basis of race, color, national origin, age, disability, sex, sexual orientation, or gender identity.            Thank you!     Thank you for choosing [facility]  for your care. Our goal is always to provide you with excellent care. Hearing back from our patients is one way we can continue to improve our services. Please take a few minutes to complete the written survey that you may receive in the mail after your visit with us. Thank you!             Your Updated Medication List - Protect others around you: Learn how to safely use, store and  throw away your medicines at www.disposemymeds.org.          This list is accurate as of: 10/9/17  2:51 PM.  Always use your most recent med list.                   Brand Name Dispense Instructions for use Diagnosis    * albuterol (2.5 MG/3ML) 0.083% neb solution     60 vial    Take 1 vial (2.5 mg) by nebulization every 4 hours as needed for shortness of breath / dyspnea or wheezing    Acute bronchitis with symptoms > 10 days       * PROAIR  (90 BASE) MCG/ACT Inhaler   Generic drug:  albuterol     18 Inhaler    INHALE 2 PUFFS INTO THE LUNGS EVERY 6 HOURS AS NEEDED FOR SHORTNESS OF BREATH / DYSPNEA OR WHEEZING    Acute bronchospasm       amitriptyline 10 MG tablet    ELAVIL     Take 10 mg by mouth        amLODIPine 10 MG tablet    NORVASC    90 tablet    TAKE 1 TABLET (10 MG) BY MOUTH DAILY    Essential hypertension with goal blood pressure less than 140/90, Benign essential hypertension       atorvastatin 20 MG tablet    LIPITOR    30 tablet    Take 1 tablet (20 mg) by mouth daily    Hypertriglyceridemia       cephALEXin 500 MG capsule    KEFLEX    20 capsule    Take 1 capsule (500 mg) by mouth 2 times daily for 10 days    Cellulitis of hand, left       CREON 64272-37072 UNITS Cpep per EC capsule   Generic drug:  amylase-lipase-protease      Take 24,000 Units by mouth 4 times daily as needed Take with meals and one snack        * cyclobenzaprine 5 MG tablet    FLEXERIL    42 tablet    Take 1 tablet (5 mg) by mouth 3 times daily as needed for muscle spasms Avoid driving or operating machinery while on this medication- has drowsy effects.    Rib pain on right side       * cyclobenzaprine 5 MG tablet    FLEXERIL    90 tablet    May take 1 or 2 tablets 3 times a day as needed for muscle spasm and pain. Sedating. Preferably take at bedtime    Rib pain on right side       fenofibrate micronized 200 MG capsule    LOFIBRA    90 capsule    Take 1 capsule (200 mg) by mouth every morning (before breakfast)    Acute  pancreatitis, unspecified complication status, unspecified pancreatitis type       losartan 50 MG tablet    COZAAR    90 tablet    TAKE 1 TABLET (50 MG) BY MOUTH DAILY    Essential hypertension with goal blood pressure less than 140/90, Benign essential hypertension       montelukast 10 MG tablet    SINGULAIR    30 tablet    TAKE 1 TABLET (10 MG) BY MOUTH AT BEDTIME FOR COUGH/ALLERGIES    Cough, Multiple allergies       ondansetron 8 MG tablet    ZOFRAN    18 tablet    Take 1 tablet (8 mg) by mouth every 8 hours as needed for nausea    Nausea       oxyCODONE 5 MG IR tablet    ROXICODONE    20 tablet    Take 1/2 tablet up to every 12 hours as needed for pain.    Idiopathic acute pancreatitis, unspecified complication status       oxyCODONE-acetaminophen 5-325 MG per tablet    PERCOCET    90 tablet    Take 1 tablet by mouth every 6 hours as needed for pain Maximum 4 tablet(s) per day    Recurrent pancreatitis (H)       sertraline 100 MG tablet    ZOLOFT    90 tablet    Take 1 tablet (100 mg) by mouth daily    Episode of recurrent major depressive disorder, unspecified depression episode severity (H)       ZANTAC 150 MG tablet   Generic drug:  ranitidine      Take  by mouth daily.        * Notice:  This list has 4 medication(s) that are the same as other medications prescribed for you. Read the directions carefully, and ask your doctor or other care provider to review them with you.

## 2017-10-09 NOTE — PATIENT INSTRUCTIONS
Based on your medical history and these are the current health maintenance or preventive care services that you are due for (some may have been done at this visit)  Health Maintenance Due   Topic Date Due     URINE DRUG SCREEN Q1 YR  03/14/1996         At [facility], we strive to deliver an exceptional experience to you, every time we see you.    If you receive a survey in the mail, please send us back your thoughts. We really do value your feedback.    Your care team's suggested websites for health information:  Www.[employer].org : Up to date and easily searchable information on multiple topics.  Www.medlineplus.gov : medication info, interactive tutorials, watch real surgeries online  Www.familydoctor.org : good info from the Academy of Family Physicians  Www.cdc.gov : public health info, travel advisories, epidemics (H1N1)  Www.aap.org : children's health info, normal development, vaccinations  Www.health.[facility].[place].us : [place] dept of health, public health issues in [place], N1N1    How to contact your care team:   Team Viridiana/Spirit [phone]         Pharmacy [phone]    Dr. Levin, Ladi Gunter PA-C, Dr. Iyer, Indira CASTILLO CNP, Berta Agrawal PA-C, Dr. Morrell, and JONATHAN Rivera CNP    Team RN: Amina      Clinic hours  M-Th 7 am-7 pm   Fri 7 am-5 pm.   Urgent care M-F 11 am-9 pm,   Sat/Sun 9 am-5 pm.  Pharmacy M-Th 8 am-8 pm Fri 8 am-6 pm  Sat/Sun 9 am-5 pm.     All password changes, disabled accounts, or ID changes in [employer]/MyHealth will be done by our Access Services Department.    If you need help with your account or password, call: [phone]. Clinic staff no longer has the ability to change passwords.

## 2017-10-09 NOTE — PROGRESS NOTES
SUBJECTIVE:   Zulay Rossi is a 37 year old female who presents to clinic today for the following health issues:      Joint Pain    Onset: today    Description:   Location: right hand & right armpit    Character: Burning (hand) - arm pit (sharp on touch)    Intensity: moderate    Progression of Symptoms: worse    Accompanying Signs & Symptoms:  Other symptoms: swelling and redness (hand)    History:   Previous similar pain: no       Precipitating factors:   Trauma or overuse: no     Alleviating factors:  Improved by: nothing    Therapies Tried and outcome: na    Burned left hand about 5 days ago and has been placing antibiotic ointment.  This morning awoke to redness and swelling around burn site.      Problem list and histories reviewed & adjusted, as indicated.  Additional history: as documented    Patient Active Problem List   Diagnosis     Mood disorder (H)     CARDIOVASCULAR SCREENING; LDL GOAL LESS THAN 160     Headache     Goiter, non-toxic     Hypertension goal BP (blood pressure) < 140/90     Acute gouty arthritis     Elevated uric acid in blood     Ovarian cyst     S/P LEEP of cervix     Tobacco abuse     Acute bronchospasm     Benign essential hypertension     Controlled substance agreement signed     Chronic pain syndrome     Health Care Home     Recurrent pancreatitis (H)     Past Surgical History:   Procedure Laterality Date     BIOPSY  7/4/2013    Colonoscopy     COLONOSCOPY       HC VAGINAL DELIVERY, NO EPISIOTOMY/FORCEPS  11/2000     LEEP TX, CERVICAL  2003    MILI III     SOFT TISSUE SURGERY  5/2016    Lacerated tendon repair on right ring finger       Social History   Substance Use Topics     Smoking status: Current Every Day Smoker     Packs/day: 0.50     Years: 15.00     Types: Cigarettes     Smokeless tobacco: Never Used     Alcohol use Yes      Comment: occ     Family History   Problem Relation Age of Onset     Unknown/Adopted Mother              Reviewed and updated as needed this visit by  clinical staffTobacco  Allergies  Meds  Med Hx  Surg Hx  Fam Hx  Soc Hx      Reviewed and updated as needed this visit by Provider         ROS:  Constitutional, HEENT, cardiovascular, pulmonary, gi and gu systems are negative, except as otherwise noted.      OBJECTIVE:   /76 (BP Location: Right arm, Patient Position: Chair, Cuff Size: Adult Regular)  Pulse 105  Temp 97  F (36.1  C) (Oral)  Wt 138 lb (62.6 kg)  LMP 09/22/2017 (Approximate)  SpO2 99%  Breastfeeding? No  BMI 25.24 kg/m2  Body mass index is 25.24 kg/(m^2).  GENERAL: healthy, alert and no distress  NECK: no adenopathy, no asymmetry, masses, or scars and thyroid normal to palpation  RESP: lungs clear to auscultation - no rales, rhonchi or wheezes  CV: regular rate and rhythm, normal S1 S2, no S3 or S4, no murmur, click or rub, no peripheral edema and peripheral pulses strong  ABDOMEN: soft, nontender, no hepatosplenomegaly, no masses and bowel sounds normal  MS: no gross musculoskeletal defects noted, no edema  SKIN: 5 by 3 cm area of redness on left dorsal hand with healing burn    Diagnostic Test Results:  none     ASSESSMENT/PLAN:     1. Cellulitis of hand, left  Oral abx treatment  - cephALEXin (KEFLEX) 500 MG capsule; Take 1 capsule (500 mg) by mouth 2 times daily for 10 days  Dispense: 20 capsule; Refill: 0    2. Burn of second degree of back of left hand, sequela  Monitor    The uses and side effects, including black box warnings as appropriate, were discussed in detail.  All patient questions were answered.  The patient was instructed to call immediately if any side effects developed.       FUTURE APPOINTMENTS:       - Follow-up visit in 2 days if not improved or immediately if worsening.    Kaylie Sesay MD  [facility]

## 2017-10-09 NOTE — NURSING NOTE
"Chief Complaint   Patient presents with     Musculoskeletal Problem       Initial /76 (BP Location: Right arm, Patient Position: Chair, Cuff Size: Adult Regular)  Pulse 105  Temp 97  F (36.1  C) (Oral)  Wt 138 lb (62.6 kg)  LMP 09/22/2017 (Approximate)  SpO2 99%  Breastfeeding? No  BMI 25.24 kg/m2 Estimated body mass index is 25.24 kg/(m^2) as calculated from the following:    Height as of 10/6/17: 5' 2\" (1.575 m).    Weight as of this encounter: 138 lb (62.6 kg).  Medication Reconciliation: complete   An,CMA (AMAA)      "

## 2017-10-10 ENCOUNTER — MYC MEDICAL ADVICE (OUTPATIENT)
Dept: FAMILY MEDICINE | Facility: CLINIC | Age: 37
End: 2017-10-10

## 2017-10-17 ENCOUNTER — OFFICE VISIT (OUTPATIENT)
Dept: FAMILY MEDICINE | Facility: CLINIC | Age: 37
End: 2017-10-17
Payer: COMMERCIAL

## 2017-10-17 VITALS
DIASTOLIC BLOOD PRESSURE: 84 MMHG | BODY MASS INDEX: 24.51 KG/M2 | TEMPERATURE: 97.8 F | WEIGHT: 134 LBS | SYSTOLIC BLOOD PRESSURE: 134 MMHG | HEART RATE: 106 BPM | OXYGEN SATURATION: 98 %

## 2017-10-17 DIAGNOSIS — E04.9 GOITER, NON-TOXIC: ICD-10-CM

## 2017-10-17 DIAGNOSIS — I10 BENIGN ESSENTIAL HYPERTENSION: ICD-10-CM

## 2017-10-17 DIAGNOSIS — K86.1 CHRONIC PANCREATITIS, UNSPECIFIED PANCREATITIS TYPE (H): Primary | ICD-10-CM

## 2017-10-17 DIAGNOSIS — R11.0 NAUSEA: ICD-10-CM

## 2017-10-17 LAB
ALBUMIN SERPL-MCNC: 4 G/DL (ref 3.4–5)
ALP SERPL-CCNC: 61 U/L (ref 40–150)
ALT SERPL W P-5'-P-CCNC: 91 U/L (ref 0–50)
AMYLASE SERPL-CCNC: 15 U/L (ref 30–110)
ANION GAP SERPL CALCULATED.3IONS-SCNC: 14 MMOL/L (ref 3–14)
AST SERPL W P-5'-P-CCNC: 195 U/L (ref 0–45)
BASOPHILS # BLD AUTO: 0 10E9/L (ref 0–0.2)
BASOPHILS NFR BLD AUTO: 0.3 %
BILIRUB SERPL-MCNC: 0.5 MG/DL (ref 0.2–1.3)
BUN SERPL-MCNC: 4 MG/DL (ref 7–30)
CALCIUM SERPL-MCNC: 8.3 MG/DL (ref 8.5–10.1)
CHLORIDE SERPL-SCNC: 98 MMOL/L (ref 94–109)
CO2 SERPL-SCNC: 23 MMOL/L (ref 20–32)
CREAT SERPL-MCNC: 0.5 MG/DL (ref 0.52–1.04)
DIFFERENTIAL METHOD BLD: ABNORMAL
EOSINOPHIL # BLD AUTO: 0.2 10E9/L (ref 0–0.7)
EOSINOPHIL NFR BLD AUTO: 3.2 %
ERYTHROCYTE [DISTWIDTH] IN BLOOD BY AUTOMATED COUNT: 11.6 % (ref 10–15)
GFR SERPL CREATININE-BSD FRML MDRD: >90 ML/MIN/1.7M2
GLUCOSE SERPL-MCNC: 135 MG/DL (ref 70–99)
HCT VFR BLD AUTO: 39.8 % (ref 35–47)
HGB BLD-MCNC: 13.8 G/DL (ref 11.7–15.7)
LIPASE SERPL-CCNC: 264 U/L (ref 73–393)
LYMPHOCYTES # BLD AUTO: 2.2 10E9/L (ref 0.8–5.3)
LYMPHOCYTES NFR BLD AUTO: 32.5 %
MCH RBC QN AUTO: 34 PG (ref 26.5–33)
MCHC RBC AUTO-ENTMCNC: 34.7 G/DL (ref 31.5–36.5)
MCV RBC AUTO: 98 FL (ref 78–100)
MONOCYTES # BLD AUTO: 0.6 10E9/L (ref 0–1.3)
MONOCYTES NFR BLD AUTO: 8.5 %
NEUTROPHILS # BLD AUTO: 3.8 10E9/L (ref 1.6–8.3)
NEUTROPHILS NFR BLD AUTO: 55.5 %
PLATELET # BLD AUTO: 362 10E9/L (ref 150–450)
POTASSIUM SERPL-SCNC: 3.3 MMOL/L (ref 3.4–5.3)
PROT SERPL-MCNC: 8.3 G/DL (ref 6.8–8.8)
RBC # BLD AUTO: 4.06 10E12/L (ref 3.8–5.2)
SODIUM SERPL-SCNC: 135 MMOL/L (ref 133–144)
TSH SERPL DL<=0.005 MIU/L-ACNC: 1.36 MU/L (ref 0.4–4)
WBC # BLD AUTO: 6.9 10E9/L (ref 4–11)

## 2017-10-17 PROCEDURE — 80050 GENERAL HEALTH PANEL: CPT | Performed by: PHYSICIAN ASSISTANT

## 2017-10-17 PROCEDURE — 99213 OFFICE O/P EST LOW 20 MIN: CPT | Performed by: PHYSICIAN ASSISTANT

## 2017-10-17 PROCEDURE — 83690 ASSAY OF LIPASE: CPT | Performed by: PHYSICIAN ASSISTANT

## 2017-10-17 PROCEDURE — 82150 ASSAY OF AMYLASE: CPT | Performed by: PHYSICIAN ASSISTANT

## 2017-10-17 PROCEDURE — 36415 COLL VENOUS BLD VENIPUNCTURE: CPT | Performed by: PHYSICIAN ASSISTANT

## 2017-10-17 RX ORDER — ONDANSETRON 8 MG/1
8 TABLET, FILM COATED ORAL EVERY 8 HOURS PRN
Qty: 18 TABLET | Refills: 3 | Status: SHIPPED | OUTPATIENT
Start: 2017-10-17 | End: 2018-04-03

## 2017-10-17 NOTE — LETTER
October 17, 2017      Zulay Rossi  [address] [place] [id number]        To Whom It May Concern:    Zulay Rossi was seen and treated in our clinic and missed work 10/16/17 due to illness. She may return to work 10/17/17 without restrictions.    Please contact me for questions or concerns.      Sincerely,        Moises Puentes PA-C

## 2017-10-17 NOTE — NURSING NOTE
"Chief Complaint   Patient presents with     Nausea       Initial /84  Pulse 106  Temp 97.8  F (36.6  C) (Oral)  Wt 134 lb (60.8 kg)  LMP 09/22/2017 (Approximate)  SpO2 98%  BMI 24.51 kg/m2 Estimated body mass index is 24.51 kg/(m^2) as calculated from the following:    Height as of 10/6/17: 5' 2\" (1.575 m).    Weight as of this encounter: 134 lb (60.8 kg).  Medication Reconciliation: complete  Linh Springer CMA    "

## 2017-10-17 NOTE — PROGRESS NOTES
SUBJECTIVE:                                                    Zulay Rossi is a 37 year old female who presents to clinic today for the following health issues:    Nausea      Duration: started on Saturday     Description:           Nauseated, stomach ache    Intensity:  Moderate - better today     Accompanying signs and symptoms:  none    History  Previous {similar problem: YES  Previous evaluation:  Has been seen multiple times for this in the past off an on     Aggravating factors: pt has recurrent pancreatitis - per pt sx's felt like this     is seeing [facility].  10 x in the last years. Thought it was related high triglycerides.     Alleviating factors:     Other Therapies tried: clear liquids     Pt needs work note as she missed work yesterday due to symptoms    Problem list and histories reviewed & adjusted, as indicated.  Additional history: as documented    Patient Active Problem List   Diagnosis     Mood disorder (H)     CARDIOVASCULAR SCREENING; LDL GOAL LESS THAN 160     Headache     Goiter, non-toxic     Hypertension goal BP (blood pressure) < 140/90     Acute gouty arthritis     Elevated uric acid in blood     Ovarian cyst     S/P LEEP of cervix     Tobacco abuse     Acute bronchospasm     Benign essential hypertension     Controlled substance agreement signed     Chronic pain syndrome     Health Care Home     Recurrent pancreatitis (H)     Chronic pancreatitis, unspecified pancreatitis type (H)     Past Surgical History:   Procedure Laterality Date     BIOPSY  7/4/2013    Colonoscopy     COLONOSCOPY       HC VAGINAL DELIVERY, NO EPISIOTOMY/FORCEPS  11/2000     LEEP TX, CERVICAL  2003    MILI III     SOFT TISSUE SURGERY  5/2016    Lacerated tendon repair on right ring finger       Social History   Substance Use Topics     Smoking status: Current Every Day Smoker     Packs/day: 0.50     Years: 15.00     Types: Cigarettes     Smokeless tobacco: Never Used     Alcohol use Yes      Comment: occ     Family  History   Problem Relation Age of Onset     Unknown/Adopted Mother          Current Outpatient Prescriptions   Medication Sig Dispense Refill     ondansetron (ZOFRAN) 8 MG tablet Take 1 tablet (8 mg) by mouth every 8 hours as needed for nausea 18 tablet 3     cephALEXin (KEFLEX) 500 MG capsule Take 1 capsule (500 mg) by mouth 2 times daily for 10 days 20 capsule 0     amitriptyline (ELAVIL) 10 MG tablet Take 10 mg by mouth       oxyCODONE-acetaminophen (PERCOCET) 5-325 MG per tablet Take 1 tablet by mouth every 6 hours as needed for pain Maximum 4 tablet(s) per day 90 tablet 0     PROAIR  (90 BASE) MCG/ACT inhaler INHALE 2 PUFFS INTO THE LUNGS EVERY 6 HOURS AS NEEDED FOR SHORTNESS OF BREATH / DYSPNEA OR WHEEZING 18 Inhaler 4     montelukast (SINGULAIR) 10 MG tablet TAKE 1 TABLET (10 MG) BY MOUTH AT BEDTIME FOR COUGH/ALLERGIES 30 tablet 4     cyclobenzaprine (FLEXERIL) 5 MG tablet May take 1 or 2 tablets 3 times a day as needed for muscle spasm and pain. Sedating. Preferably take at bedtime 90 tablet 0     oxyCODONE (ROXICODONE) 5 MG IR tablet Take 1/2 tablet up to every 12 hours as needed for pain. 20 tablet 0     losartan (COZAAR) 50 MG tablet TAKE 1 TABLET (50 MG) BY MOUTH DAILY 90 tablet 0     amLODIPine (NORVASC) 10 MG tablet TAKE 1 TABLET (10 MG) BY MOUTH DAILY 90 tablet 0     CREON 33757 UNITS CPEP per EC capsule Take 24,000 Units by mouth 4 times daily as needed Take with meals and one snack       atorvastatin (LIPITOR) 20 MG tablet Take 1 tablet (20 mg) by mouth daily 30 tablet 2     fenofibrate micronized (LOFIBRA) 200 MG capsule Take 1 capsule (200 mg) by mouth every morning (before breakfast) 90 capsule 1     sertraline (ZOLOFT) 100 MG tablet Take 1 tablet (100 mg) by mouth daily 90 tablet 3     cyclobenzaprine (FLEXERIL) 5 MG tablet Take 1 tablet (5 mg) by mouth 3 times daily as needed for muscle spasms Avoid driving or operating machinery while on this medication- has drowsy effects. 42 tablet 0      albuterol (2.5 MG/3ML) 0.083% nebulizer solution Take 1 vial (2.5 mg) by nebulization every 4 hours as needed for shortness of breath / dyspnea or wheezing 60 vial 5     ranitidine (ZANTAC) 150 MG tablet Take  by mouth daily.       No Known Allergies  BP Readings from Last 3 Encounters:   10/17/17 134/84   10/09/17 130/76   10/06/17 110/81    Wt Readings from Last 3 Encounters:   10/17/17 134 lb (60.8 kg)   10/09/17 138 lb (62.6 kg)   10/06/17 137 lb (62.1 kg)                  Labs reviewed in EPIC        ROS:  Constitutional, HEENT, cardiovascular, pulmonary, gi and gu systems are negative, except as otherwise noted.      OBJECTIVE:   /84  Pulse 106  Temp 97.8  F (36.6  C) (Oral)  Wt 134 lb (60.8 kg)  LMP 09/22/2017 (Approximate)  SpO2 98%  BMI 24.51 kg/m2  Body mass index is 24.51 kg/(m^2).  GENERAL: healthy, alert and no distress  Head: Normocephalic, atraumatic.  Eyes: Conjunctiva clear, non icteric. PERRLA.  Ears: External ears and TMs normal BL.  Nose: Septum midline, nasal mucosa pink and moist. No discharge.  Mouth / Throat: Normal dentition.  No oral lesions. Pharynx non erythematous, tonsils without hypertrophy.  Neck: Supple, no enlarged LN, trachea midline.  Heart: S1 and S2 normal, no murmurs, clicks, gallops or rubs. Regular rate and rhythm.  Chest: Clear; no wheezes or rales.  The abdomen is soft with epigastric tenderness,  No guarding, mass, rebound or organomegaly. Bowel sounds are normal.        Diagnostic Test Results:  No results found for this or any previous visit (from the past 24 hour(s)).    ASSESSMENT/PLAN:         ICD-10-CM    1. Chronic pancreatitis, unspecified pancreatitis type (H) K86.1 Comprehensive metabolic panel     Amylase     Lipase     CBC with platelets differential   2. Goiter, non-toxic E04.9 TSH with free T4 reflex   3. Benign essential hypertension I10    4. Nausea R11.0 ondansetron (ZOFRAN) 8 MG tablet   labs pending  con't conservative care.   Follow up   With her specialist.   warning signs discussed.  Note for work given       Moises Puentes PA-C  [facility]

## 2017-10-17 NOTE — LETTER
[facility]  [address] [address]  Phone: [phone]    October 17, 2017        Zulay Rossi  [address] [facility] [address]          To whom it may concern:    RE: Zulay Rossi    Patient was seen and treated today at our clinic and missed work 10/17/18 due to illness. She may return to work 10/17/17 with no restrictions.     Please contact me for questions or concerns.      Sincerely,        Moises Puentes PA-C

## 2017-10-17 NOTE — MR AVS SNAPSHOT
After Visit Summary   10/17/2017    Zulay Rossi    MRN: [id number]           Patient Information     Date Of Birth          1980        Visit Information        Provider Department      10/17/2017 8:40 AM Moises Puentes PA-C [facility]        Today's Diagnoses     Chronic pancreatitis, unspecified pancreatitis type (H)    -  1    Goiter, non-toxic        Benign essential hypertension        Nausea           Follow-ups after your visit        Who to contact     If you have questions or need follow up information about today's clinic visit or your schedule please contact [facility] directly at [phone].  Normal or non-critical lab and imaging results will be communicated to you by [facility], letter or phone within 4 business days after the clinic has received the results. If you do not hear from us within 7 days, please contact the clinic through [facility] or phone. If you have a critical or abnormal lab result, we will notify you by phone as soon as possible.  Submit refill requests through [facility]Drive or call your pharmacy and they will forward the refill request to us. Please allow 3 business days for your refill to be completed.          Additional Information About Your Visit        MyChart Information     FreeDrive gives you secure access to your electronic health record. If you see a primary care provider, you can also send messages to your care team and make appointments. If you have questions, please call your primary care clinic.  If you do not have a primary care provider, please call [phone] and they will assist you.        Care EveryWhere ID     This is your Care EveryWhere ID. This could be used by other organizations to access your [place] medical records  [id number]        Your Vitals Were     Pulse Temperature Last Period Pulse Oximetry BMI (Body Mass Index)       106 97.8  F (36.6  C) (Oral) 09/22/2017 (Approximate) 98% 24.51 kg/m2         Blood Pressure from Last 3 Encounters:   10/17/17 134/84   10/09/17 130/76   10/06/17 110/81    Weight from Last 3 Encounters:   10/17/17 134 lb (60.8 kg)   10/09/17 138 lb (62.6 kg)   10/06/17 137 lb (62.1 kg)              We Performed the Following     Amylase     CBC with platelets differential     Comprehensive metabolic panel     Lipase     TSH with free T4 reflex          Where to get your medicines      These medications were sent to [facility]/pharmacy #[id number] - [facility] [address]  AT CORNER OF [facility]  [address] , [facility] [address]     Phone:  [phone]     ondansetron 8 MG tablet          Primary Care Provider Office Phone # Fax #    Dania Munoz PA-C [phone] [address] [facility] [address]        Equal Access to Services     SAUD LR : Hadii aad ku hadasho Soomaali, waaxda luqadaha, qaybta kaalmada adeegyada, lori irwin . So [facility] [phone].    ATENCIÓN: Si habla español, tiene a delgado disposición servicios gratuitos de asistencia lingüística. Llame al [phone].    We comply with applicable federal civil rights laws and [place] laws. We do not discriminate on the basis of race, color, national origin, age, disability, sex, sexual orientation, or gender identity.            Thank you!     Thank you for choosing [facility]  for your care. Our goal is always to provide you with excellent care. Hearing back from our patients is one way we can continue to improve our services. Please take a few minutes to complete the written survey that you may receive in the mail after your visit with us. Thank you!             Your Updated Medication List - Protect others around you: Learn how to safely use, store and throw away your medicines at www.disposemymeds.org.          This list is accurate as of: 10/17/17  9:06 AM.  Always use your most recent med list.                   Brand  Name Dispense Instructions for use Diagnosis    * albuterol (2.5 MG/3ML) 0.083% neb solution     60 vial    Take 1 vial (2.5 mg) by nebulization every 4 hours as needed for shortness of breath / dyspnea or wheezing    Acute bronchitis with symptoms > 10 days       * PROAIR  (90 BASE) MCG/ACT Inhaler   Generic drug:  albuterol     18 Inhaler    INHALE 2 PUFFS INTO THE LUNGS EVERY 6 HOURS AS NEEDED FOR SHORTNESS OF BREATH / DYSPNEA OR WHEEZING    Acute bronchospasm       amitriptyline 10 MG tablet    ELAVIL     Take 10 mg by mouth        amLODIPine 10 MG tablet    NORVASC    90 tablet    TAKE 1 TABLET (10 MG) BY MOUTH DAILY    Essential hypertension with goal blood pressure less than 140/90, Benign essential hypertension       atorvastatin 20 MG tablet    LIPITOR    30 tablet    Take 1 tablet (20 mg) by mouth daily    Hypertriglyceridemia       cephALEXin 500 MG capsule    KEFLEX    20 capsule    Take 1 capsule (500 mg) by mouth 2 times daily for 10 days    Cellulitis of hand, left       CREON 18800-21351 UNITS Cpep per EC capsule   Generic drug:  amylase-lipase-protease      Take 24,000 Units by mouth 4 times daily as needed Take with meals and one snack        * cyclobenzaprine 5 MG tablet    FLEXERIL    42 tablet    Take 1 tablet (5 mg) by mouth 3 times daily as needed for muscle spasms Avoid driving or operating machinery while on this medication- has drowsy effects.    Rib pain on right side       * cyclobenzaprine 5 MG tablet    FLEXERIL    90 tablet    May take 1 or 2 tablets 3 times a day as needed for muscle spasm and pain. Sedating. Preferably take at bedtime    Rib pain on right side       fenofibrate micronized 200 MG capsule    LOFIBRA    90 capsule    Take 1 capsule (200 mg) by mouth every morning (before breakfast)    Acute pancreatitis, unspecified complication status, unspecified pancreatitis type       losartan 50 MG tablet    COZAAR    90 tablet    TAKE 1 TABLET (50 MG) BY MOUTH DAILY     Essential hypertension with goal blood pressure less than 140/90, Benign essential hypertension       montelukast 10 MG tablet    SINGULAIR    30 tablet    TAKE 1 TABLET (10 MG) BY MOUTH AT BEDTIME FOR COUGH/ALLERGIES    Cough, Multiple allergies       ondansetron 8 MG tablet    ZOFRAN    18 tablet    Take 1 tablet (8 mg) by mouth every 8 hours as needed for nausea    Nausea       oxyCODONE 5 MG IR tablet    ROXICODONE    20 tablet    Take 1/2 tablet up to every 12 hours as needed for pain.    Idiopathic acute pancreatitis, unspecified complication status       oxyCODONE-acetaminophen 5-325 MG per tablet    PERCOCET    90 tablet    Take 1 tablet by mouth every 6 hours as needed for pain Maximum 4 tablet(s) per day    Recurrent pancreatitis (H)       sertraline 100 MG tablet    ZOLOFT    90 tablet    Take 1 tablet (100 mg) by mouth daily    Episode of recurrent major depressive disorder, unspecified depression episode severity (H)       ZANTAC 150 MG tablet   Generic drug:  ranitidine      Take  by mouth daily.        * Notice:  This list has 4 medication(s) that are the same as other medications prescribed for you. Read the directions carefully, and ask your doctor or other care provider to review them with you.

## 2017-10-31 NOTE — PROGRESS NOTES
SUBJECTIVE:                                                    Zulay Rossi is a 37 year old female who presents to clinic today for the following health issues:      ED/UC Followup:    Facility:  [facility]  Date of visit: 10/30/2017  Reason for visit: Nausea and vomiting  Current Status: Per pt feels a little better but still notice some epigastrric pain     Patient with chronic pancreatitis and elevated TGs who presents to go over her medications and get refills.    While I was on materinty leave she had 2 more hospital visits for pain.  Her most recent one she had normal blood levels and her symptoms resolved with IV fluids. She avoids alcohol now and only eats healthy food. She feels overall since taking more cholesterol medication, the creon, and changing her lifestyle the pain in between these episodes improve.     Creon is helping her symptoms. Next appt with gastroenterology is Dec (sees biliary specialist).  They suggested possibly taking her gallbladder out to help.  They put her on elavil for pain and for better nights rest.   Saw lipid specialist in August, TGs are in 200s now so she does not need to be seen there anymore.     Blood pressure-No chest pain, shortness of breath, edema, PND, or orthopnea. No dizziness or vision changes. No side effects from medications. Blood pressure has been stable on medication.    Mood-has been stable on zoloft. Denies suicidal or homicidal thoughts.  Patient instructed to go to the emergency room or call 911 if these occur.          Problem list and histories reviewed & adjusted, as indicated.  Additional history: as documented    Patient Active Problem List   Diagnosis     Mood disorder (H)     Headache     Goiter, non-toxic     Acute gouty arthritis     Elevated uric acid in blood     Ovarian cyst     S/P LEEP of cervix     Tobacco abuse     Acute bronchospasm     Benign essential hypertension     Controlled substance agreement signed     Chronic pain  [facility]     Health Care Home     Chronic pancreatitis, unspecified pancreatitis type (H)     Idiopathic chronic pancreatitis (H)     Past Surgical History:   Procedure Laterality Date     BIOPSY  7/4/2013    Colonoscopy     COLONOSCOPY       HC VAGINAL DELIVERY, NO EPISIOTOMY/FORCEPS  11/2000     LEEP TX, CERVICAL  2003    MILI III     SOFT TISSUE SURGERY  5/2016    Lacerated tendon repair on right ring finger       Social History   Substance Use Topics     Smoking status: Current Every Day Smoker     Packs/day: 0.50     Years: 15.00     Types: Cigarettes     Smokeless tobacco: Never Used     Alcohol use Yes      Comment: occ     Family History   Problem Relation Age of Onset     Unknown/Adopted Mother          Current Outpatient Prescriptions   Medication Sig Dispense Refill     atorvastatin (LIPITOR) 10 MG tablet Take 10 mg by mouth daily       amitriptyline (ELAVIL) 10 MG tablet Take 1 tablet (10 mg) by mouth At Bedtime 90 tablet 1     losartan (COZAAR) 50 MG tablet TAKE 1 TABLET (50 MG) BY MOUTH DAILY 90 tablet 3     CREON [id number] UNITS CPEP per EC capsule Take 1 capsule (24,000 Units) by mouth 4 times daily as needed Take with meals and one snack 120 capsule 1     amLODIPine (NORVASC) 10 MG tablet TAKE 1 TABLET (10 MG) BY MOUTH DAILY 90 tablet 3     oxyCODONE-acetaminophen (PERCOCET) 5-325 MG per tablet Take 1 tablet by mouth every 6 hours as needed for pain Maximum 4 tablet(s) per day 60 tablet 0     ondansetron (ZOFRAN) 8 MG tablet Take 1 tablet (8 mg) by mouth every 8 hours as needed for nausea 18 tablet 3     PROAIR  (90 BASE) MCG/ACT inhaler INHALE 2 PUFFS INTO THE LUNGS EVERY 6 HOURS AS NEEDED FOR SHORTNESS OF BREATH / DYSPNEA OR WHEEZING 18 Inhaler 4     montelukast (SINGULAIR) 10 MG tablet TAKE 1 TABLET (10 MG) BY MOUTH AT BEDTIME FOR COUGH/ALLERGIES 30 tablet 4     oxyCODONE (ROXICODONE) 5 MG IR tablet Take 1/2 tablet up to every 12 hours as needed for pain. 20 tablet 0     fenofibrate  micronized (LOFIBRA) 200 MG capsule Take 1 capsule (200 mg) by mouth every morning (before breakfast) 90 capsule 1     sertraline (ZOLOFT) 100 MG tablet Take 1 tablet (100 mg) by mouth daily 90 tablet 3     cyclobenzaprine (FLEXERIL) 5 MG tablet Take 1 tablet (5 mg) by mouth 3 times daily as needed for muscle spasms Avoid driving or operating machinery while on this medication- has drowsy effects. 42 tablet 0     albuterol (2.5 MG/3ML) 0.083% nebulizer solution Take 1 vial (2.5 mg) by nebulization every 4 hours as needed for shortness of breath / dyspnea or wheezing 60 vial 5     ranitidine (ZANTAC) 150 MG tablet Take  by mouth daily.       [DISCONTINUED] amitriptyline (ELAVIL) 10 MG tablet Take 10 mg by mouth       [DISCONTINUED] losartan (COZAAR) 50 MG tablet TAKE 1 TABLET (50 MG) BY MOUTH DAILY 90 tablet 0     [DISCONTINUED] amLODIPine (NORVASC) 10 MG tablet TAKE 1 TABLET (10 MG) BY MOUTH DAILY 90 tablet 0     [DISCONTINUED] atorvastatin (LIPITOR) 20 MG tablet Take 1 tablet (20 mg) by mouth daily 30 tablet 2     No Known Allergies      ROS:  Constitutional, HEENT, cardiovascular, pulmonary, GI, , musculoskeletal, neuro, skin, endocrine and psych systems are negative, except as otherwise noted.      OBJECTIVE:   /82  Pulse 91  Temp 98.7  F (37.1  C) (Oral)  Wt 135 lb (61.2 kg)  LMP 09/22/2017 (Approximate)  SpO2 100%  Breastfeeding? No  BMI 24.69 kg/m2  Body mass index is 24.69 kg/(m^2).  GENERAL: healthy, alert and no distress  RESP: lungs clear to auscultation - no rales, rhonchi or wheezes  CV: regular rate and rhythm, normal S1 S2, no S3 or S4, no murmur, click or rub, no peripheral edema and peripheral pulses strong  ABDOMEN: {soft, tender epigastric region  MS: no gross musculoskeletal defects noted, no edema  NEURO: Normal strength and tone, mentation intact and speech normal  Psych: normal affect        ASSESSMENT/PLAN:     {ASSESSMENT / PLAN:  (K86.1) Idiopathic chronic pancreatitis (H)   (primary encounter diagnosis)  Comment:   Plan: amitriptyline (ELAVIL) 10 MG tablet, CREON         [id number] UNITS CPEP per EC capsule          Recheck q 3 months for pain medications if needing them  Will need to get urine drug screen done in next 6 months also  Should recheck TGs, per patient her gastroenterology doctor probably will in Dec, but if he does not she will let me know and I will order them.  We will need to keep an eye on her TGs as they have been so high in the past.  Last reading per patient was the best she has had and was in the 200s.     (F39) Mood disorder (H)  Comment: stable  Plan: recheck 6 months    (I10) Benign essential hypertension  Comment: to goal  Plan: losartan (COZAAR) 50 MG tablet, amLODIPine         (NORVASC) 10 MG tablet        Recheck 1 year      Patient Instructions   No more 3000 mg of tylenol in one day  Recheck 3 months if needing more pain medications, otherwise 6 months        Dania Munoz PA-C  [facility]

## 2017-11-02 ENCOUNTER — OFFICE VISIT (OUTPATIENT)
Dept: FAMILY MEDICINE | Facility: CLINIC | Age: 37
End: 2017-11-02
Payer: COMMERCIAL

## 2017-11-02 VITALS
WEIGHT: 135 LBS | TEMPERATURE: 98.7 F | OXYGEN SATURATION: 100 % | DIASTOLIC BLOOD PRESSURE: 82 MMHG | BODY MASS INDEX: 24.69 KG/M2 | HEART RATE: 91 BPM | SYSTOLIC BLOOD PRESSURE: 124 MMHG

## 2017-11-02 DIAGNOSIS — I10 BENIGN ESSENTIAL HYPERTENSION: ICD-10-CM

## 2017-11-02 DIAGNOSIS — F39 MOOD DISORDER (H): ICD-10-CM

## 2017-11-02 DIAGNOSIS — K86.1 IDIOPATHIC CHRONIC PANCREATITIS (H): Primary | ICD-10-CM

## 2017-11-02 PROCEDURE — 99214 OFFICE O/P EST MOD 30 MIN: CPT | Performed by: PHYSICIAN ASSISTANT

## 2017-11-02 RX ORDER — PANCRELIPASE 24000; 76000; 120000 [USP'U]/1; [USP'U]/1; [USP'U]/1
24000 CAPSULE, DELAYED RELEASE PELLETS ORAL 4 TIMES DAILY PRN
Qty: 120 CAPSULE | Refills: 1 | Status: SHIPPED | OUTPATIENT
Start: 2017-11-02 | End: 2017-12-24

## 2017-11-02 RX ORDER — AMITRIPTYLINE HYDROCHLORIDE 10 MG/1
10 TABLET ORAL AT BEDTIME
Qty: 90 TABLET | Refills: 1 | Status: SHIPPED | OUTPATIENT
Start: 2017-11-02 | End: 2018-04-12

## 2017-11-02 RX ORDER — OXYCODONE AND ACETAMINOPHEN 5; 325 MG/1; MG/1
1 TABLET ORAL EVERY 6 HOURS PRN
Qty: 60 TABLET | Refills: 0 | Status: SHIPPED | OUTPATIENT
Start: 2017-11-02 | End: 2017-11-28

## 2017-11-02 RX ORDER — ATORVASTATIN CALCIUM 10 MG/1
10 TABLET, FILM COATED ORAL DAILY
COMMUNITY
End: 2018-11-06

## 2017-11-02 RX ORDER — AMLODIPINE BESYLATE 10 MG/1
TABLET ORAL
Qty: 90 TABLET | Refills: 3 | Status: SHIPPED | OUTPATIENT
Start: 2017-11-02 | End: 2018-11-01

## 2017-11-02 RX ORDER — LOSARTAN POTASSIUM 50 MG/1
TABLET ORAL
Qty: 90 TABLET | Refills: 3 | Status: SHIPPED | OUTPATIENT
Start: 2017-11-02 | End: 2018-09-21 | Stop reason: DRUGHIGH

## 2017-11-02 NOTE — NURSING NOTE
"Chief Complaint   Patient presents with     Hospital F/U     [facility] F/U for nausea/vomiting       Initial BP (!) 133/91  Pulse 91  Temp 98.7  F (37.1  C) (Oral)  Wt 135 lb (61.2 kg)  LMP 09/22/2017 (Approximate)  SpO2 100%  Breastfeeding? No  BMI 24.69 kg/m2 Estimated body mass index is 24.69 kg/(m^2) as calculated from the following:    Height as of 10/6/17: 5' 2\" (1.575 m).    Weight as of this encounter: 135 lb (61.2 kg).  Medication Reconciliation: complete      Carlos Manuel Horowitz MA    "

## 2017-11-02 NOTE — PATIENT INSTRUCTIONS
No more than  3000 mg of tylenol in one day  Recheck 3 months if needing more pain medications, otherwise 6 months

## 2017-11-02 NOTE — MR AVS SNAPSHOT
After Visit Summary   11/2/2017    Zulay Rossi    MRN: [id number]           Patient Information     Date Of Birth          1980        Visit Information        Provider Department      11/2/2017 7:20 AM Dania Munoz PA-C [facility]        Today's Diagnoses     Idiopathic chronic pancreatitis (H)    -  1    Essential hypertension with goal blood pressure less than 140/90        Benign essential hypertension        Recurrent pancreatitis (H)          Care Instructions    No more 3000 mg of tylenol in one day          Follow-ups after your visit        Who to contact     If you have questions or need follow up information about today's clinic visit or your schedule please contact [facility] directly at [phone].  Normal or non-critical lab and imaging results will be communicated to you by [employer], letter or phone within 4 business days after the clinic has received the results. If you do not hear from us within 7 days, please contact the clinic through [employer] or phone. If you have a critical or abnormal lab result, we will notify you by phone as soon as possible.  Submit refill requests through Move[employer] or call your pharmacy and they will forward the refill request to us. Please allow 3 business days for your refill to be completed.          Additional Information About Your Visit        MyChart Information     Move[employer] gives you secure access to your electronic health record. If you see a primary care provider, you can also send messages to your care team and make appointments. If you have questions, please call your primary care clinic.  If you do not have a primary care provider, please call [phone] and they will assist you.        Care EveryWhere ID     This is your Care EveryWhere ID. This could be used by other organizations to access your [place] medical records  [id number]        Your Vitals Were     Pulse Temperature Last Period Pulse  Oximetry Breastfeeding? BMI (Body Mass Index)    91 98.7  F (37.1  C) (Oral) 09/22/2017 (Approximate) 100% No 24.69 kg/m2       Blood Pressure from Last 3 Encounters:   11/02/17 124/82   10/17/17 134/84   10/09/17 130/76    Weight from Last 3 Encounters:   11/02/17 135 lb (61.2 kg)   10/17/17 134 lb (60.8 kg)   10/09/17 138 lb (62.6 kg)              Today, you had the following     No orders found for display         Today's Medication Changes          These changes are accurate as of: 11/2/17  7:51 AM.  If you have any questions, ask your nurse or doctor.               These medicines have changed or have updated prescriptions.        Dose/Directions    amitriptyline 10 MG tablet   Commonly known as:  ELAVIL   This may have changed:  when to take this   Used for:  Idiopathic chronic pancreatitis (H)   Changed by:  Dania Munoz PA-C        Dose:  10 mg   Take 1 tablet (10 mg) by mouth At Bedtime   Quantity:  90 tablet   Refills:  1       amLODIPine 10 MG tablet   Commonly known as:  NORVASC   This may have changed:  See the new instructions.   Used for:  Essential hypertension with goal blood pressure less than 140/90, Benign essential hypertension   Changed by:  Dania Munoz PA-C        TAKE 1 TABLET (10 MG) BY MOUTH DAILY   Quantity:  90 tablet   Refills:  3       LIPITOR 10 MG tablet   This may have changed:  Another medication with the same name was removed. Continue taking this medication, and follow the directions you see here.   Generic drug:  atorvastatin   Changed by:  Dania Munoz PA-C        Dose:  10 mg   Take 10 mg by mouth daily   Refills:  0       losartan 50 MG tablet   Commonly known as:  COZAAR   This may have changed:  See the new instructions.   Used for:  Essential hypertension with goal blood pressure less than 140/90, Benign essential hypertension   Changed by:  Dania Munoz PA-C        TAKE 1 TABLET (50 MG) BY MOUTH DAILY   Quantity:  90  tablet   Refills:  3            Where to get your medicines      These medications were sent to [facility]/pharmacy #[id number] - [place], [place] - [address],  [address] [facility]  [address], , [facility] [address]     Phone:  [phone]     amitriptyline 10 MG tablet    amLODIPine 10 MG tablet    CREON [address]97-[address]11 UNITS Cpep per EC capsule    losartan 50 MG tablet         Some of these will need a paper prescription and others can be bought over the counter.  Ask your nurse if you have questions.     Bring a paper prescription for each of these medications     oxyCODONE-acetaminophen 5-325 MG per tablet                Primary Care Provider Office Phone # Fax #    Dania Munoz PA-C [phone] [address] [facility] [address]        Equal Access to Services     [facility]LO [facility] : Hadii christopher mason hadasho Soomaali, waaxda luqadaha, qaybta kaalmada adenehayada, lori irwin . So [facility] [phone].    ATENCIÓN: Si habla español, tiene a delgado disposición servicios gratuitos de asistencia lingüística. Llame al [phone].    We comply with applicable federal civil rights laws and [place] laws. We do not discriminate on the basis of race, color, national origin, age, disability, sex, sexual orientation, or gender identity.            Thank you!     Thank you for choosing [facility]  for your care. Our goal is always to provide you with excellent care. Hearing back from our patients is one way we can continue to improve our services. Please take a few minutes to complete the written survey that you may receive in the mail after your visit with us. Thank you!             Your Updated Medication List - Protect others around you: Learn how to safely use, store and throw away your medicines at www.disposemymeds.org.          This list is accurate as of: 11/2/17  7:51 AM.  Always use your most recent med list.                    Brand Name Dispense Instructions for use Diagnosis    * albuterol (2.5 MG/3ML) 0.083% neb solution     60 vial    Take 1 vial (2.5 mg) by nebulization every 4 hours as needed for shortness of breath / dyspnea or wheezing    Acute bronchitis with symptoms > 10 days       * PROAIR  (90 BASE) MCG/ACT Inhaler   Generic drug:  albuterol     18 Inhaler    INHALE 2 PUFFS INTO THE LUNGS EVERY 6 HOURS AS NEEDED FOR SHORTNESS OF BREATH / DYSPNEA OR WHEEZING    Acute bronchospasm       amitriptyline 10 MG tablet    ELAVIL    90 tablet    Take 1 tablet (10 mg) by mouth At Bedtime    Idiopathic chronic pancreatitis (H)       amLODIPine 10 MG tablet    NORVASC    90 tablet    TAKE 1 TABLET (10 MG) BY MOUTH DAILY    Essential hypertension with goal blood pressure less than 140/90, Benign essential hypertension       CREON 51332-15875 UNITS Cpep per EC capsule   Generic drug:  amylase-lipase-protease     120 capsule    Take 1 capsule (24,000 Units) by mouth 4 times daily as needed Take with meals and one snack    Idiopathic chronic pancreatitis (H)       cyclobenzaprine 5 MG tablet    FLEXERIL    42 tablet    Take 1 tablet (5 mg) by mouth 3 times daily as needed for muscle spasms Avoid driving or operating machinery while on this medication- has drowsy effects.    Rib pain on right side       fenofibrate micronized 200 MG capsule    LOFIBRA    90 capsule    Take 1 capsule (200 mg) by mouth every morning (before breakfast)    Acute pancreatitis, unspecified complication status, unspecified pancreatitis type       LIPITOR 10 MG tablet   Generic drug:  atorvastatin      Take 10 mg by mouth daily        losartan 50 MG tablet    COZAAR    90 tablet    TAKE 1 TABLET (50 MG) BY MOUTH DAILY    Essential hypertension with goal blood pressure less than 140/90, Benign essential hypertension       montelukast 10 MG tablet    SINGULAIR    30 tablet    TAKE 1 TABLET (10 MG) BY MOUTH AT BEDTIME FOR COUGH/ALLERGIES    Cough, Multiple  allergies       ondansetron 8 MG tablet    ZOFRAN    18 tablet    Take 1 tablet (8 mg) by mouth every 8 hours as needed for nausea    Nausea       oxyCODONE 5 MG IR tablet    ROXICODONE    20 tablet    Take 1/2 tablet up to every 12 hours as needed for pain.    Idiopathic acute pancreatitis, unspecified complication status       oxyCODONE-acetaminophen 5-325 MG per tablet    PERCOCET    60 tablet    Take 1 tablet by mouth every 6 hours as needed for pain Maximum 4 tablet(s) per day    Recurrent pancreatitis (H)       sertraline 100 MG tablet    ZOLOFT    90 tablet    Take 1 tablet (100 mg) by mouth daily    Episode of recurrent major depressive disorder, unspecified depression episode severity (H)       ZANTAC 150 MG tablet   Generic drug:  ranitidine      Take  by mouth daily.        * Notice:  This list has 2 medication(s) that are the same as other medications prescribed for you. Read the directions carefully, and ask your doctor or other care provider to review them with you.

## 2017-12-04 ENCOUNTER — TRANSFERRED RECORDS (OUTPATIENT)
Dept: HEALTH INFORMATION MANAGEMENT | Facility: CLINIC | Age: 37
End: 2017-12-04

## 2017-12-12 ENCOUNTER — TELEPHONE (OUTPATIENT)
Dept: CARE COORDINATION | Facility: CLINIC | Age: 37
End: 2017-12-12

## 2017-12-12 ENCOUNTER — CARE COORDINATION (OUTPATIENT)
Dept: CARE COORDINATION | Facility: CLINIC | Age: 37
End: 2017-12-12

## 2017-12-12 DIAGNOSIS — R11.2 NON-INTRACTABLE VOMITING WITH NAUSEA, UNSPECIFIED VOMITING TYPE: Primary | ICD-10-CM

## 2017-12-12 NOTE — LETTER
Health Care Home - Access Care Plan    About Me  Patient Name:  Zulay Rossi    YOB: 1980  Age:                            37 year old   [place] MRN:         [id number] Telephone Information:     Home Phone [phone]   Mobile [phone]   Home Phone [phone]       Address:    [address] [place] [address] Email address:  [email or web address]      Emergency Contact(s)  Name Relationship Lgl Grd Work Phone Home Phone Mobile Phone   SLAVA OLIVARES Brother    [phone]             Health Maintenance:      My Access Plan  Medical Emergency 911   Questions or concerns during clinic hours Primary Clinic Line, I will call the clinic directly: Primary Clinic: [facility]- [phone]   24 Hour Appointment Line [phone] or  [phone])  (toll free)   24 Hour Nurse Line [phone] (toll free)   Questions or concerns outside clinic hours 24 Hour Appointment Line, I will call the after-hours on-call line:   [facility] [phone] or [phone]) (toll-free)   Preferred Urgent Care     Preferred Hospital Preferred Hospital: [facility]  [phone]   [id number] [place] [facility] [address],  [address] [facility]     Behavioral Health Crisis Line The National Suicide Prevention Lifeline at 1-976.764.2348 or 911     My Care Team Members  Patient Care Team       Relationship Specialty Notifications Start End    Dania Munoz PA-C PCP - General Physician Assistant  3/19/15     Phone: [phone] Fax: [phone] [address] [facility] [address]    Dania Munoz PA-C Physician Assistant Physician Assistant  3/19/15     Phone: [phone] Fax: [phone] [address] [facility] [address]    Stone Cline RN Clinic Care Coordinator   5/2/17     Comment:  [facility]    Phone: [phone] Fax: [phone]            My  Medical and Care Information  Problem List   Patient Active Problem List   Diagnosis     Mood disorder (H)     Headache     Goiter, non-toxic     Acute gouty arthritis     Elevated uric acid in blood     Ovarian cyst     S/P LEEP of cervix     Tobacco abuse     Acute bronchospasm     Benign essential hypertension     Controlled substance agreement signed     Chronic pain syndrome     Health Care Home     Chronic pancreatitis, unspecified pancreatitis type (H)     Idiopathic chronic pancreatitis (H)      Current Medications and Allergies:  See printed Medication Report

## 2017-12-12 NOTE — PROGRESS NOTES
Clinic Care Coordination Contact  [facility]/Voicemail    Referral Source: [facility]  Clinical Data: Care Coordinator Outreach  Outreach attempted x 1.  Left message on voicemail with call back information and requested return call.  Plan: Care Coordinator will mail out care coordination introduction letter with care coordinator contact information and explanation of care coordination services. Care Coordinator will try to reach patient again in 1-2 business days.  Leoncio Vigil RN  Clinic Care Coordinator  [phone] or [phone]

## 2017-12-12 NOTE — LETTER
[facility] [place]:   [address] [place] [address]  [phone]        December 12, 2017      Zulay Rossi  [address] [place] [id number]    Dear Zulay,  I am the Clinic Care Coordinator that works with your primary care provider's clinic. I wanted to introduce myself and provide you with my contact information for you to be able to call me with any questions or concerns. Below is a description of what Clinic Care Coordination is and how I can further assist you.     The Clinic Care Coordinator role is a Registered Nurse and/or  who understands the health care system. The goal of Clinic Care Coordination is to help you manage your health and improve access to the [facility] system in the most efficient manner.  The Registered Nurse can assist you in meeting your health care goals by providing education, coordinating services, and strengthening the communication among your providers. The  can assist you with financial, behavioral, psychosocial, and chemical dependency and counseling/psychiatric resources.    Please feel free to keep this letter and contact information to contact me at [phone], [phone] with any further questions or concerns that may arise. We at [facility] are focused on providing you with the highest-quality healthcare experience possible and that all starts with you.       Sincerely,     Leoncio Vigil RN  Clinic Care Coordinator    Enclosed: I have enclosed a copy of a 24 Hour Access Plan. This has helpful phone numbers for you to call when needed. Please keep this in an easy to access place to use as needed.

## 2017-12-12 NOTE — TELEPHONE ENCOUNTER
DC'd from [facility] on 12/8 to Home self care   Primary Problem: Non- intractable vomiting with nausea  LOS: 0.2

## 2017-12-13 NOTE — PROGRESS NOTES
Clinic Care Coordination Contact  OUTREACH    Referral Information:  Referral Source: [facility]  Reason for Contact: Post ED follow up    Clinical Concerns:  Current Medical Concerns: Patient reports GI doctor put her on Gabapentin and that might have been what was was causing the N/V. Patient was in the ED for N/V. Patient reports currently she is feeling much better and reports no vomiting. Patient was given Reglan and Zofran and this seems to be helping. Patient has no other concerns at this time.     Current Behavioral Concerns: none    Education Provided to patient: Encouraged follow up appointment with PCP.    Medication Management:  Patient is knowledgeable on medications and is adherent. No financial concerns reported at this time.       Plan: 1) Patient will continue to follow treatment plan as directed and follow up with PCP with concerns ongoing.   2) Care Coordination to remain available for future needs.     Leoncio Vigil RN  Clinic Care Coordinator  [phone] or [phone]

## 2017-12-19 ENCOUNTER — MYC REFILL (OUTPATIENT)
Dept: FAMILY MEDICINE | Facility: CLINIC | Age: 37
End: 2017-12-19

## 2017-12-19 DIAGNOSIS — K86.1 CHRONIC PANCREATITIS, UNSPECIFIED PANCREATITIS TYPE (H): ICD-10-CM

## 2017-12-19 RX ORDER — OXYCODONE AND ACETAMINOPHEN 5; 325 MG/1; MG/1
1 TABLET ORAL EVERY 6 HOURS PRN
Qty: 60 TABLET | Refills: 0 | Status: SHIPPED | OUTPATIENT
Start: 2017-12-19 | End: 2018-01-15

## 2017-12-19 NOTE — TELEPHONE ENCOUNTER
Brought Rx to the  for . Sent patient a monEchelle message with Dania's message.Susan Ortega MA/PIERCE

## 2017-12-19 NOTE — TELEPHONE ENCOUNTER
Message from [employer]:  Original authorizing provider: KIMBERLY Sanders would like a refill of the following medications:  oxyCODONE-acetaminophen (PERCOCET) 5-325 MG per tablet [Dania Munoz PA-C]    Preferred pharmacy: [facility]/PHARMACY #[id number] - [facility] [address],  [address] [facility]    Comment:

## 2017-12-19 NOTE — TELEPHONE ENCOUNTER
Patient filled less than a month ago, this is a pretty big increase in her use.  I will refill but she needs to make a f/u appointment with me to discuss a future plan for this since you her use/needs are increasing.   Dania Munoz PA-C

## 2017-12-24 DIAGNOSIS — K86.1 IDIOPATHIC CHRONIC PANCREATITIS (H): ICD-10-CM

## 2017-12-27 ENCOUNTER — TRANSFERRED RECORDS (OUTPATIENT)
Dept: HEALTH INFORMATION MANAGEMENT | Facility: CLINIC | Age: 37
End: 2017-12-27

## 2017-12-27 RX ORDER — PANCRELIPASE 24000; 76000; 120000 [USP'U]/1; [USP'U]/1; [USP'U]/1
CAPSULE, DELAYED RELEASE PELLETS ORAL
Qty: 120 CAPSULE | Refills: 1 | Status: SHIPPED | OUTPATIENT
Start: 2017-12-27 | End: 2018-04-03

## 2017-12-27 NOTE — TELEPHONE ENCOUNTER
Routing refill request to provider for review/approval because:  Drug not on the [facility] refill protocol     Lisa Yepez RN

## 2018-01-01 ENCOUNTER — TRANSFERRED RECORDS (OUTPATIENT)
Dept: HEALTH INFORMATION MANAGEMENT | Facility: CLINIC | Age: 38
End: 2018-01-01

## 2018-01-09 ENCOUNTER — TELEPHONE (OUTPATIENT)
Dept: FAMILY MEDICINE | Facility: CLINIC | Age: 38
End: 2018-01-09

## 2018-01-09 NOTE — TELEPHONE ENCOUNTER
Patient is scheduled on 1/9/18 with TASHI Arreola .  This appointment needs to be rescheduled as the provider is going to be unavailable.  Patient was contacted by: Phone. Left message for patient to call and reschedule appointment .Susan Ortega MA/TC

## 2018-01-09 NOTE — PROGRESS NOTES
SUBJECTIVE:                                                    Zulay Rossi is a 37 year old female who presents to clinic today for the following health issues:        Hospital Follow-up Visit:    Hospital/Nursing Home/IP Rehab Facility: [facility]  Date of Admission: 01/01/2018  Date of Discharge: 01/04/2018  Reason(s) for Admission: Pancreatitis            Problems taking medications regularly:  None       Medication changes since discharge: None       Problems adhering to non-medication therapy:  None    Summary of hospitalization:  CareEverywhere information obtained and reviewed  Diagnostic Tests/Treatments reviewed.  Follow up needed: none  Other Healthcare Providers Involved in Patient s Care:         None  Update since discharge: improved.     Post Discharge Medication Reconciliation: discharge medications reconciled, continue medications without change.  Plan of care communicated with patient     Coding guidelines for this visit:  Type of Medical   Decision Making Face-to-Face Visit       within 7 Days of discharge Face-to-Face Visit        within 14 days of discharge   Moderate Complexity [id number]   High Complexity 19060 [id number]              Patient seen for another episode of acute on chronic pancreatitis.  Enzymes were elevated so they kept her.  Given iv fluids, rest, pain medications, and did improve.  [facility] [facility]. Last visit was before hospital stay  and told to f/u in 6 months.  Told to stay on creon at same dose and added gabapentin for nerve pain associated with her symptoms.     She is not sure if helping yet but knows it can take awhile.  Some sedation but not too bad of side effects per patient.     Is on narcotics often for these bouts, has agreement with me for percocet max 90 per month usually does not need that much.    Has not been to pain clinic at this point.      Trying to quit smoking.  Does now avoid alcohol.   Would like to try patches to quit.   No recent TGS, last  TGS WERE BETTER in 200s.  Did see cardiology lipid specialist but told to f/u back PRN.     No vomiting today. Mild abdominal pain only. Eating and drinking okay.       Depression-medications help. Will continue them. Denies suicidal or homicidal thoughts.  Patient instructed to go to the emergency room or call 911 if these occur.            Problem list and histories reviewed & adjusted, as indicated.  Additional history: as documented    Patient Active Problem List   Diagnosis     Mood disorder (H)     Headache     Goiter, non-toxic     Acute gouty arthritis     Elevated uric acid in blood     Ovarian cyst     S/P LEEP of cervix     Tobacco abuse     Acute bronchospasm     Benign essential hypertension     Controlled substance agreement signed     Chronic pain syndrome     Health Care Home     Chronic pancreatitis, unspecified pancreatitis type (H)     Idiopathic chronic pancreatitis (H)     Past Surgical History:   Procedure Laterality Date     BIOPSY  7/4/2013    Colonoscopy     COLONOSCOPY       HC VAGINAL DELIVERY, NO EPISIOTOMY/FORCEPS  11/2000     LEEP TX, CERVICAL  2003    MILI III     SOFT TISSUE SURGERY  5/2016    Lacerated tendon repair on right ring finger       Social History   Substance Use Topics     Smoking status: Current Every Day Smoker     Packs/day: 0.50     Years: 15.00     Types: Cigarettes     Smokeless tobacco: Never Used     Alcohol use Yes      Comment: occ     Family History   Problem Relation Age of Onset     Unknown/Adopted Mother          Current Outpatient Prescriptions   Medication Sig Dispense Refill     gabapentin (NEURONTIN) 300 MG capsule Take 2 capsules (600 mg) by mouth 3 times daily 180 capsule 0     nicotine (NICODERM CQ) 14 MG/24HR 24 hr patch Place 1 patch onto the skin every 24 hours For  45 days 45 patch 0     nicotine (NICODERM CQ) 7 MG/24HR 24 hr patch Place 1 patch onto the skin every 24 hours For two weeks 14 patch 0     CREON 47682-14650 UNITS CPEP per EC capsule  TAKE 1 CAPSULE (24,000 UNITS) BY MOUTH 4 TIMES DAILY AS NEEDED TAKE WITH MEALS AND ONE SNACK 120 capsule 1     oxyCODONE-acetaminophen (PERCOCET) 5-325 MG per tablet Take 1 tablet by mouth every 6 hours as needed for pain Maximum 4 tablet(s) per day 60 tablet 0     albuterol (PROAIR HFA) 108 (90 BASE) MCG/ACT Inhaler Inhale 1-2 puffs into the lungs every 4 hours as needed for shortness of breath / dyspnea or wheezing 18 Inhaler 4     atorvastatin (LIPITOR) 10 MG tablet Take 10 mg by mouth daily       amitriptyline (ELAVIL) 10 MG tablet Take 1 tablet (10 mg) by mouth At Bedtime 90 tablet 1     losartan (COZAAR) 50 MG tablet TAKE 1 TABLET (50 MG) BY MOUTH DAILY 90 tablet 3     amLODIPine (NORVASC) 10 MG tablet TAKE 1 TABLET (10 MG) BY MOUTH DAILY 90 tablet 3     ondansetron (ZOFRAN) 8 MG tablet Take 1 tablet (8 mg) by mouth every 8 hours as needed for nausea 18 tablet 3     montelukast (SINGULAIR) 10 MG tablet TAKE 1 TABLET (10 MG) BY MOUTH AT BEDTIME FOR COUGH/ALLERGIES 30 tablet 4     fenofibrate micronized (LOFIBRA) 200 MG capsule Take 1 capsule (200 mg) by mouth every morning (before breakfast) 90 capsule 1     sertraline (ZOLOFT) 100 MG tablet Take 1 tablet (100 mg) by mouth daily 90 tablet 3     cyclobenzaprine (FLEXERIL) 5 MG tablet Take 1 tablet (5 mg) by mouth 3 times daily as needed for muscle spasms Avoid driving or operating machinery while on this medication- has drowsy effects. 42 tablet 0     albuterol (2.5 MG/3ML) 0.083% nebulizer solution Take 1 vial (2.5 mg) by nebulization every 4 hours as needed for shortness of breath / dyspnea or wheezing 60 vial 5     ranitidine (ZANTAC) 150 MG tablet Take  by mouth daily.       [DISCONTINUED] gabapentin (NEURONTIN) 300 MG capsule Take 600 mg by mouth       No Known Allergies      ROS:  Constitutional, HEENT, cardiovascular, pulmonary, GI, , musculoskeletal, neuro, skin, endocrine and psych systems are negative, except as otherwise noted.      OBJECTIVE:   BP  "134/89  Pulse 90  Temp 97.6  F (36.4  C) (Oral)  Ht 5' 2\" (1.575 m)  Wt 140 lb (63.5 kg)  SpO2 100%  Breastfeeding? No  BMI 25.61 kg/m2  Body mass index is 25.61 kg/(m^2).  GENERAL: healthy, alert and no distress  RESP: lungs clear to auscultation - no rales, rhonchi or wheezes  CV: regular rate and rhythm, normal S1 S2, no S3 or S4, no murmur, click or rub, no peripheral edema and peripheral pulses strong  ABDOMEN:  Mildly bloated, tender mildly  MS: no gross musculoskeletal defects noted, no edema  SKIN: no suspicious lesions or rashes  NEURO: Normal strength and tone, mentation intact and speech normal  PSYCH: mentation appears normal, affect normal/bright        ASSESSMENT/PLAN:     ASSESSMENT / PLAN:  (K86.1) Idiopathic chronic pancreatitis (H)  (primary encounter diagnosis)  Comment:   Plan: gabapentin (NEURONTIN) 300 MG capsule,         Comprehensive metabolic panel      [facility] 60 tabs. Recheck Q3 months in clinic.     (F39) Mood disorder (H)  Comment: stable recheck 6 months    Plan:     (G89.4) Chronic pain syndrome  Comment:   Plan:     (Z72.0) Tobacco abuse  Comment:   Plan: nicotine (NICODERM CQ) 14 MG/24HR 24 hr patch,         nicotine (NICODERM CQ) 7 MG/24HR 24 hr patch            (E78.2) Elevated triglycerides with high cholesterol  Comment:   Plan: Lipid panel reflex to direct LDL Fasting          I will put out message to team to call gastroenterology doctor to make sure no other f/u is needed after this recent stay in hospital  Also need to recheck fasting TGs, see below  She is avoiding alcohol  Recommend urine drug screen (random ) at next visit due to narcotic agreement  Will refer to pain clinic in future if needed    Patient Instructions   Return fasting for labs, drink plenty of fluids  I will contact gastroenterology and ask if further f/u needed     Try patches, let me know if this does not work          KIMBERLY Sanders " [facility]

## 2018-01-11 ENCOUNTER — OFFICE VISIT (OUTPATIENT)
Dept: FAMILY MEDICINE | Facility: CLINIC | Age: 38
End: 2018-01-11
Payer: COMMERCIAL

## 2018-01-11 ENCOUNTER — TELEPHONE (OUTPATIENT)
Dept: FAMILY MEDICINE | Facility: CLINIC | Age: 38
End: 2018-01-11

## 2018-01-11 VITALS
BODY MASS INDEX: 25.76 KG/M2 | DIASTOLIC BLOOD PRESSURE: 89 MMHG | SYSTOLIC BLOOD PRESSURE: 134 MMHG | TEMPERATURE: 97.6 F | OXYGEN SATURATION: 100 % | HEART RATE: 90 BPM | WEIGHT: 140 LBS | HEIGHT: 62 IN

## 2018-01-11 DIAGNOSIS — G89.4 CHRONIC PAIN SYNDROME: ICD-10-CM

## 2018-01-11 DIAGNOSIS — Z72.0 TOBACCO ABUSE: ICD-10-CM

## 2018-01-11 DIAGNOSIS — K86.1 IDIOPATHIC CHRONIC PANCREATITIS (H): Primary | ICD-10-CM

## 2018-01-11 DIAGNOSIS — E78.2 ELEVATED TRIGLYCERIDES WITH HIGH CHOLESTEROL: ICD-10-CM

## 2018-01-11 DIAGNOSIS — F39 MOOD DISORDER (H): ICD-10-CM

## 2018-01-11 PROCEDURE — 99214 OFFICE O/P EST MOD 30 MIN: CPT | Performed by: PHYSICIAN ASSISTANT

## 2018-01-11 RX ORDER — NICOTINE 21 MG/24HR
1 PATCH, TRANSDERMAL 24 HOURS TRANSDERMAL EVERY 24 HOURS
Qty: 45 PATCH | Refills: 0 | Status: SHIPPED | OUTPATIENT
Start: 2018-01-11 | End: 2018-04-12

## 2018-01-11 RX ORDER — GABAPENTIN 300 MG/1
600 CAPSULE ORAL 3 TIMES DAILY
Qty: 180 CAPSULE | Refills: 0 | COMMUNITY
Start: 2018-01-11 | End: 2018-09-21

## 2018-01-11 RX ORDER — GABAPENTIN 300 MG/1
600 CAPSULE ORAL
COMMUNITY
End: 2018-01-11

## 2018-01-11 NOTE — NURSING NOTE
"Chief Complaint   Patient presents with     Hospital F/U     [facility] F/U for pancreatitis        Initial /89  Pulse 90  Temp 97.6  F (36.4  C) (Oral)  Ht 5' 2\" (1.575 m)  Wt 140 lb (63.5 kg)  SpO2 100%  Breastfeeding? No  BMI 25.61 kg/m2 Estimated body mass index is 25.61 kg/(m^2) as calculated from the following:    Height as of this encounter: 5' 2\" (1.575 m).    Weight as of this encounter: 140 lb (63.5 kg).  Medication Reconciliation: complete      Carlos Manuel Horowitz MA    "

## 2018-01-11 NOTE — PATIENT INSTRUCTIONS
Return fasting for labs, drink plenty of fluids  I will contact gastroenterology and ask if further f/u needed     Try patches, let me know if this does not work

## 2018-01-11 NOTE — TELEPHONE ENCOUNTER
Please call [place] doctors office and leave message for doctor.  Patient sees Dr. Luis for chronic pancreatitis.  Was told to be seen in 6 months from last visit. Patient was in emergency room and hospitalized for a few days again with more pain and elevated lipase.  Please make sure this doctor 1) has the records from the most recent visit in hospital and 2) does not want to make changes to follow up.  I have future lipids placed to recheck her TGs and am continuing her gabapentin that they have her on.      Thank you, Dania    Office number is  and [facility] records are in her chart if you need that

## 2018-01-11 NOTE — MR AVS SNAPSHOT
After Visit Summary   1/11/2018    Zulay Rossi    MRN: [id number]           Patient Information     Date Of Birth          1980        Visit Information        Provider Department      1/11/2018 12:40 PM Dania Munoz PA-C [facility]        Today's Diagnoses     Idiopathic chronic pancreatitis (H)    -  1    Mood disorder (H)        Chronic pain syndrome        Tobacco abuse        Elevated triglycerides with high cholesterol          Care Instructions    Return fasting for labs, drink plenty of fluids  I will contact gastroenterology and ask if further f/u needed     Try patches, let me know if this does not work              Follow-ups after your visit        Future tests that were ordered for you today     Open Future Orders        Priority Expected Expires Ordered    Lipid panel reflex to direct LDL Fasting Routine  1/11/2019 1/11/2018    Comprehensive metabolic panel Routine  1/11/2019 1/11/2018            Who to contact     If you have questions or need follow up information about today's clinic visit or your schedule please contact [facility] directly at [phone].  Normal or non-critical lab and imaging results will be communicated to you by Start[employer] Labshart, letter or phone within 4 business days after the clinic has received the results. If you do not hear from us within 7 days, please contact the clinic through [employer] or phone. If you have a critical or abnormal lab result, we will notify you by phone as soon as possible.  Submit refill requests through [employer] or call your pharmacy and they will forward the refill request to us. Please allow 3 business days for your refill to be completed.          Additional Information About Your Visit        StartDate Labshart Information     [employer] gives you secure access to your electronic health record. If you see a primary care provider, you can also send messages to your care team and make appointments. If you have  "questions, please call your primary care clinic.  If you do not have a primary care provider, please call [phone] and they will assist you.        Care EveryWhere ID     This is your Care EveryWhere ID. This could be used by other organizations to access your [place] medical records  [id number]        Your Vitals Were     Pulse Temperature Height Pulse Oximetry Breastfeeding? BMI (Body Mass Index)    90 97.6  F (36.4  C) (Oral) 5' 2\" (1.575 m) 100% No 25.61 kg/m2       Blood Pressure from Last 3 Encounters:   01/11/18 134/89   11/02/17 124/82   10/17/17 134/84    Weight from Last 3 Encounters:   01/11/18 140 lb (63.5 kg)   11/02/17 135 lb (61.2 kg)   10/17/17 134 lb (60.8 kg)                 Today's Medication Changes          These changes are accurate as of: 1/11/18  1:10 PM.  If you have any questions, ask your nurse or doctor.               Start taking these medicines.        Dose/Directions    * nicotine 14 MG/24HR 24 hr patch   Commonly known as:  NICODERM CQ   Used for:  Tobacco abuse   Started by:  Dania Munoz PA-C        Dose:  1 patch   Place 1 patch onto the skin every 24 hours For  45 days   Quantity:  45 patch   Refills:  0       * nicotine 7 MG/24HR 24 hr patch   Commonly known as:  NICODERM CQ   Used for:  Tobacco abuse   Started by:  Dania Munoz PA-C        Dose:  1 patch   Place 1 patch onto the skin every 24 hours For two weeks   Quantity:  14 patch   Refills:  0       * Notice:  This list has 2 medication(s) that are the same as other medications prescribed for you. Read the directions carefully, and ask your doctor or other care provider to review them with you.      These medicines have changed or have updated prescriptions.        Dose/Directions    gabapentin 300 MG capsule   Commonly known as:  NEURONTIN   This may have changed:  when to take this   Used for:  Idiopathic chronic pancreatitis (H)   Changed by:  Dania Munoz PA-C        Dose:  " 600 mg   Take 2 capsules (600 mg) by mouth 3 times daily   Quantity:  180 capsule   Refills:  0         Stop taking these medicines if you haven't already. Please contact your care team if you have questions.     oxyCODONE IR 5 MG tablet   Commonly known as:  ROXICODONE   Stopped by:  Dania Munoz PA-C                Where to get your medicines      These medications were sent to [facility]/pharmacy #[id number] - [place], [place] - [address]  AT [address] [facility]  [address] , [place] [address]     Phone:  [phone]     nicotine 14 MG/24HR 24 hr patch    nicotine 7 MG/24HR 24 hr patch                Primary Care Provider Office Phone # Fax #    Dania Munoz PA-C [phone] [address] [facility] [address]        Equal Access to Services     [facility] AH: Hadii aad ku hadasho Soomaali, waaxda luqadaha, qaybta kaalmada adeegyada, waxay idiin hayaan adeneha kharash laJoyaan . So [facility] [phone].    ATENCIÓN: Si habla español, tiene a delgado disposición servicios gratuitos de asistencia lingüística. Garrett al [phone].    We comply with applicable federal civil rights laws and [place] laws. We do not discriminate on the basis of race, color, national origin, age, disability, sex, sexual orientation, or gender identity.            Thank you!     Thank you for choosing [facility]  for your care. Our goal is always to provide you with excellent care. Hearing back from our patients is one way we can continue to improve our services. Please take a few minutes to complete the written survey that you may receive in the mail after your visit with us. Thank you!             Your Updated Medication List - Protect others around you: Learn how to safely use, store and throw away your medicines at www.disposemymeds.org.          This list is accurate as of: 1/11/18  1:10 PM.  Always use your most recent med list.                   Brand  Name Dispense Instructions for use Diagnosis    * albuterol (2.5 MG/3ML) 0.083% neb solution     60 vial    Take 1 vial (2.5 mg) by nebulization every 4 hours as needed for shortness of breath / dyspnea or wheezing    Acute bronchitis with symptoms > 10 days       * albuterol 108 (90 BASE) MCG/ACT Inhaler    PROAIR HFA    18 Inhaler    Inhale 1-2 puffs into the lungs every 4 hours as needed for shortness of breath / dyspnea or wheezing    Acute bronchospasm       amitriptyline 10 MG tablet    ELAVIL    90 tablet    Take 1 tablet (10 mg) by mouth At Bedtime    Idiopathic chronic pancreatitis (H)       amLODIPine 10 MG tablet    NORVASC    90 tablet    TAKE 1 TABLET (10 MG) BY MOUTH DAILY    Benign essential hypertension       CREON 64456-57279 UNITS Cpep per EC capsule   Generic drug:  amylase-lipase-protease     120 capsule    TAKE 1 CAPSULE (24,000 UNITS) BY MOUTH 4 TIMES DAILY AS NEEDED TAKE WITH MEALS AND ONE SNACK    Idiopathic chronic pancreatitis (H)       cyclobenzaprine 5 MG tablet    FLEXERIL    42 tablet    Take 1 tablet (5 mg) by mouth 3 times daily as needed for muscle spasms Avoid driving or operating machinery while on this medication- has drowsy effects.    Rib pain on right side       fenofibrate micronized 200 MG capsule    LOFIBRA    90 capsule    Take 1 capsule (200 mg) by mouth every morning (before breakfast)    Acute pancreatitis, unspecified complication status, unspecified pancreatitis type       gabapentin 300 MG capsule    NEURONTIN    180 capsule    Take 2 capsules (600 mg) by mouth 3 times daily    Idiopathic chronic pancreatitis (H)       LIPITOR 10 MG tablet   Generic drug:  atorvastatin      Take 10 mg by mouth daily        losartan 50 MG tablet    COZAAR    90 tablet    TAKE 1 TABLET (50 MG) BY MOUTH DAILY    Benign essential hypertension       montelukast 10 MG tablet    SINGULAIR    30 tablet    TAKE 1 TABLET (10 MG) BY MOUTH AT BEDTIME FOR COUGH/ALLERGIES    Cough, Multiple  allergies       * nicotine 14 MG/24HR 24 hr patch    NICODERM CQ    45 patch    Place 1 patch onto the skin every 24 hours For  45 days    Tobacco abuse       * nicotine 7 MG/24HR 24 hr patch    NICODERM CQ    14 patch    Place 1 patch onto the skin every 24 hours For two weeks    Tobacco abuse       ondansetron 8 MG tablet    ZOFRAN    18 tablet    Take 1 tablet (8 mg) by mouth every 8 hours as needed for nausea    Nausea       oxyCODONE-acetaminophen 5-325 MG per tablet    PERCOCET    60 tablet    Take 1 tablet by mouth every 6 hours as needed for pain Maximum 4 tablet(s) per day    Chronic pancreatitis, unspecified pancreatitis type (H)       sertraline 100 MG tablet    ZOLOFT    90 tablet    Take 1 tablet (100 mg) by mouth daily    Episode of recurrent major depressive disorder, unspecified depression episode severity (H)       ZANTAC 150 MG tablet   Generic drug:  ranitidine      Take  by mouth daily.        * Notice:  This list has 4 medication(s) that are the same as other medications prescribed for you. Read the directions carefully, and ask your doctor or other care provider to review them with you.

## 2018-01-11 NOTE — TELEPHONE ENCOUNTER
I called [place] [facility] and was transferred to Dr Toby Macrum's care coordinator and advised to leave message on her vm and she would follow up.  I left provider message as written on Trixie arroyo with my return number.  Barbara Overton RN

## 2018-01-15 ENCOUNTER — MYC REFILL (OUTPATIENT)
Dept: FAMILY MEDICINE | Facility: CLINIC | Age: 38
End: 2018-01-15

## 2018-01-15 DIAGNOSIS — K86.1 CHRONIC PANCREATITIS, UNSPECIFIED PANCREATITIS TYPE (H): ICD-10-CM

## 2018-01-15 RX ORDER — OXYCODONE AND ACETAMINOPHEN 5; 325 MG/1; MG/1
1 TABLET ORAL EVERY 6 HOURS PRN
Qty: 60 TABLET | Refills: 0 | Status: SHIPPED | OUTPATIENT
Start: 2018-01-15 | End: 2018-02-08

## 2018-01-15 NOTE — TELEPHONE ENCOUNTER
Message from [employer]:  Original authorizing provider: KIMBERLY Sanders would like a refill of the following medications:  oxyCODONE-acetaminophen (PERCOCET) 5-325 MG per tablet [Dania Munoz PA-C]    Preferred pharmacy: [facility]/PHARMACY #[id number] - [facility] [address],  AT [address] [facility]    Comment:

## 2018-01-15 NOTE — TELEPHONE ENCOUNTER
Controlled Substance Refill Request for percocet  Problem List Complete:  No       PROVIDER TO CONSIDER COMPLETION OF PROBLEM LIST AND OVERVIEW/CONTROLLED SUBSTANCE AGREEMENT      Last Written Prescription Date:  12/19/17  Last Fill Quantity: 60,   # refills: 0      [facility] Office [facility] with [facility] [place] provider: 1/11/18      Future Office visit:       Controlled substance agreement on file: Yes:  Date 4/18/17.      Processing:  Patient will  in clinic       checked in past 6 months?  Yes 11/28/17   Barbara Overton RN

## 2018-01-19 DIAGNOSIS — E78.2 ELEVATED TRIGLYCERIDES WITH HIGH CHOLESTEROL: ICD-10-CM

## 2018-01-19 DIAGNOSIS — K86.1 IDIOPATHIC CHRONIC PANCREATITIS (H): ICD-10-CM

## 2018-01-19 LAB
ALBUMIN SERPL-MCNC: 4.2 G/DL (ref 3.4–5)
ALP SERPL-CCNC: 44 U/L (ref 40–150)
ALT SERPL W P-5'-P-CCNC: 95 U/L (ref 0–50)
ANION GAP SERPL CALCULATED.3IONS-SCNC: 9 MMOL/L (ref 3–14)
AST SERPL W P-5'-P-CCNC: 157 U/L (ref 0–45)
BILIRUB SERPL-MCNC: 0.5 MG/DL (ref 0.2–1.3)
BUN SERPL-MCNC: 9 MG/DL (ref 7–30)
CALCIUM SERPL-MCNC: 8.6 MG/DL (ref 8.5–10.1)
CHLORIDE SERPL-SCNC: 102 MMOL/L (ref 94–109)
CHOLEST SERPL-MCNC: 165 MG/DL
CO2 SERPL-SCNC: 28 MMOL/L (ref 20–32)
CREAT SERPL-MCNC: 0.54 MG/DL (ref 0.52–1.04)
GFR SERPL CREATININE-BSD FRML MDRD: >90 ML/MIN/1.7M2
GLUCOSE SERPL-MCNC: 102 MG/DL (ref 70–99)
HDLC SERPL-MCNC: 67 MG/DL
LDLC SERPL CALC-MCNC: 21 MG/DL
NONHDLC SERPL-MCNC: 98 MG/DL
POTASSIUM SERPL-SCNC: 3.8 MMOL/L (ref 3.4–5.3)
PROT SERPL-MCNC: 8.2 G/DL (ref 6.8–8.8)
SODIUM SERPL-SCNC: 139 MMOL/L (ref 133–144)
TRIGL SERPL-MCNC: 386 MG/DL

## 2018-01-19 PROCEDURE — 80061 LIPID PANEL: CPT | Performed by: PHYSICIAN ASSISTANT

## 2018-01-19 PROCEDURE — 36415 COLL VENOUS BLD VENIPUNCTURE: CPT | Performed by: PHYSICIAN ASSISTANT

## 2018-01-19 PROCEDURE — 80053 COMPREHEN METABOLIC PANEL: CPT | Performed by: PHYSICIAN ASSISTANT

## 2018-01-21 PROBLEM — K76.0 FATTY INFILTRATION OF LIVER: Status: ACTIVE | Noted: 2017-01-21

## 2018-01-22 NOTE — PROGRESS NOTES
Efrain Biswas,       Your recent test results are attached, if you have any questions or concerns please feel free to contact me via e-mail or call [phone].  You blood sugar is okay, kidney function normal, liver enzymes are a little bit better than before.  You do have known fatty liver from previous scans that can be from high triglycerides at least partially.  Do you know if you have had the full hepatitis screening done before or did you discuss your elevated liver enzymes with gastroenterology? I am not able to find that in your charts although I could be just not looking in the right spot and thought it would be easier to have a conversation with you, please respond to this when you can. The medications you take for your cholesterol can also elevated you liver enzymes, although this is not harmful typically as long as we monitor them.   Your Trigleryides are above 300 again.  You have been taking your medications all the same correct?  We should recheck your triglyercides more frequently  for now and possibly change your medications.  When you feel well are you able to exercise?  Do you eat pretty healthily?  I know you have met with a cholesterol specialist in the past. Just making sure we are covering all the bases to help prevent future flares as much as possible. Take care.        It was a pleasure to see you at your recent office visit.      Sincerely,  Dania Munoz PA-C

## 2018-02-05 ENCOUNTER — OFFICE VISIT (OUTPATIENT)
Dept: FAMILY MEDICINE | Facility: CLINIC | Age: 38
End: 2018-02-05
Payer: COMMERCIAL

## 2018-02-05 VITALS
HEART RATE: 88 BPM | DIASTOLIC BLOOD PRESSURE: 92 MMHG | RESPIRATION RATE: 16 BRPM | OXYGEN SATURATION: 100 % | TEMPERATURE: 97.8 F | BODY MASS INDEX: 25.42 KG/M2 | WEIGHT: 139 LBS | SYSTOLIC BLOOD PRESSURE: 140 MMHG

## 2018-02-05 DIAGNOSIS — R10.10 UPPER ABDOMINAL PAIN: Primary | ICD-10-CM

## 2018-02-05 LAB
ALBUMIN SERPL-MCNC: 4.4 G/DL (ref 3.4–5)
ALP SERPL-CCNC: 66 U/L (ref 40–150)
ALT SERPL W P-5'-P-CCNC: 142 U/L (ref 0–50)
ANION GAP SERPL CALCULATED.3IONS-SCNC: 10 MMOL/L (ref 3–14)
AST SERPL W P-5'-P-CCNC: 238 U/L (ref 0–45)
BASOPHILS # BLD AUTO: 0 10E9/L (ref 0–0.2)
BASOPHILS NFR BLD AUTO: 0.3 %
BILIRUB SERPL-MCNC: 0.6 MG/DL (ref 0.2–1.3)
BUN SERPL-MCNC: 2 MG/DL (ref 7–30)
CALCIUM SERPL-MCNC: 9.3 MG/DL (ref 8.5–10.1)
CHLORIDE SERPL-SCNC: 98 MMOL/L (ref 94–109)
CO2 SERPL-SCNC: 27 MMOL/L (ref 20–32)
CREAT SERPL-MCNC: 0.55 MG/DL (ref 0.52–1.04)
DIFFERENTIAL METHOD BLD: ABNORMAL
EOSINOPHIL # BLD AUTO: 0.2 10E9/L (ref 0–0.7)
EOSINOPHIL NFR BLD AUTO: 2.7 %
ERYTHROCYTE [DISTWIDTH] IN BLOOD BY AUTOMATED COUNT: 11.5 % (ref 10–15)
GFR SERPL CREATININE-BSD FRML MDRD: >90 ML/MIN/1.7M2
GLUCOSE SERPL-MCNC: 118 MG/DL (ref 70–99)
HCT VFR BLD AUTO: 40.9 % (ref 35–47)
HGB BLD-MCNC: 13.8 G/DL (ref 11.7–15.7)
LIPASE SERPL-CCNC: 452 U/L (ref 73–393)
LYMPHOCYTES # BLD AUTO: 1.7 10E9/L (ref 0.8–5.3)
LYMPHOCYTES NFR BLD AUTO: 21.5 %
MCH RBC QN AUTO: 33.8 PG (ref 26.5–33)
MCHC RBC AUTO-ENTMCNC: 33.7 G/DL (ref 31.5–36.5)
MCV RBC AUTO: 100 FL (ref 78–100)
MONOCYTES # BLD AUTO: 0.6 10E9/L (ref 0–1.3)
MONOCYTES NFR BLD AUTO: 7.6 %
NEUTROPHILS # BLD AUTO: 5.3 10E9/L (ref 1.6–8.3)
NEUTROPHILS NFR BLD AUTO: 67.9 %
PLATELET # BLD AUTO: 264 10E9/L (ref 150–450)
POTASSIUM SERPL-SCNC: 3.5 MMOL/L (ref 3.4–5.3)
PROT SERPL-MCNC: 8.9 G/DL (ref 6.8–8.8)
RBC # BLD AUTO: 4.08 10E12/L (ref 3.8–5.2)
SODIUM SERPL-SCNC: 135 MMOL/L (ref 133–144)
WBC # BLD AUTO: 7.8 10E9/L (ref 4–11)

## 2018-02-05 PROCEDURE — 83690 ASSAY OF LIPASE: CPT | Performed by: NURSE PRACTITIONER

## 2018-02-05 PROCEDURE — 99213 OFFICE O/P EST LOW 20 MIN: CPT | Performed by: NURSE PRACTITIONER

## 2018-02-05 PROCEDURE — 80053 COMPREHEN METABOLIC PANEL: CPT | Performed by: NURSE PRACTITIONER

## 2018-02-05 PROCEDURE — 36415 COLL VENOUS BLD VENIPUNCTURE: CPT | Performed by: NURSE PRACTITIONER

## 2018-02-05 PROCEDURE — 85025 COMPLETE CBC W/AUTO DIFF WBC: CPT | Performed by: NURSE PRACTITIONER

## 2018-02-05 RX ORDER — METOCLOPRAMIDE 10 MG/1
10 TABLET ORAL 2 TIMES DAILY PRN
Qty: 20 TABLET | Refills: 0 | Status: SHIPPED | OUTPATIENT
Start: 2018-02-05 | End: 2018-10-18

## 2018-02-05 RX ORDER — METOCLOPRAMIDE 10 MG/1
10 TABLET ORAL
COMMUNITY
Start: 2017-12-08 | End: 2018-09-05

## 2018-02-05 NOTE — LETTER
[facility]  [id number] [place] [id number]  Phone: [phone]    February 5, 2018        Zulay Rossi  [address]  [facility] [facility] [address]          To whom it may concern:    RE: Zulay Rossi    Patient was seen and treated today at our clinic and missed work.    Please contact me for questions or concerns.      Sincerely,        JONATHAN Pathak CNP

## 2018-02-05 NOTE — PROGRESS NOTES
SUBJECTIVE:   Zulay Rossi is a 37 year old female who presents to clinic today for the following health issues:  Upper abdominal pain  Reports it started 2 days ago  Has hx of acute pancreatitis, last flare up was January 1. given pain meds, once tolerated foods sent home. Followed up with pcp and was feeling better until a couple days ago  Is hydrated, nontoxic, no fever. Pain is moderate. Needs antinausea medication, would like lipase rechecked        Problem list and histories reviewed & adjusted, as indicated.  Additional history: as documented    Patient Active Problem List   Diagnosis     Mood disorder (H)     Headache     Goiter, non-toxic     Acute gouty arthritis     Elevated uric acid in blood     Ovarian cyst     S/P LEEP of cervix     Tobacco abuse     Acute bronchospasm     Benign essential hypertension     Controlled substance agreement signed     Chronic pain syndrome     Health Care Home     Chronic pancreatitis, unspecified pancreatitis type (H)     Idiopathic chronic pancreatitis (H)     Fatty infiltration of liver     Past Surgical History:   Procedure Laterality Date     BIOPSY  7/4/2013    Colonoscopy     COLONOSCOPY       HC VAGINAL DELIVERY, NO EPISIOTOMY/FORCEPS  11/2000     LEEP TX, CERVICAL  2003    MILI III     SOFT TISSUE SURGERY  5/2016    Lacerated tendon repair on right ring finger       Social History   Substance Use Topics     Smoking status: Current Every Day Smoker     Packs/day: 0.50     Years: 15.00     Types: Cigarettes     Smokeless tobacco: Never Used     Alcohol use Yes      Comment: occ     Family History   Problem Relation Age of Onset     Unknown/Adopted Mother            Reviewed and updated as needed this visit by clinical staff       Reviewed and updated as needed this visit by Provider         ROS:  Constitutional, HEENT, cardiovascular, pulmonary, gi and gu systems are negative, except as otherwise noted.    OBJECTIVE:     BP (!) 140/92  Pulse 88  Temp 97.8  F  (36.6  C) (Oral)  Resp 16  Wt 139 lb (63 kg)  SpO2 100%  Breastfeeding? No  BMI 25.42 kg/m2  Body mass index is 25.42 kg/(m^2).  GENERAL: alert and no distress  RESP: lungs clear to auscultation - no rales, rhonchi or wheezes  CV: regular rate and rhythm, normal S1 S2, no S3 or S4, no murmur, click or rub, no peripheral edema and peripheral pulses strong  ABDOMEN: tenderness upper generalized, more on right but some tenderness left and bowel sounds normal    ASSESSMENT/PLAN:       1. Upper abdominal pain  Labs pending will notify    - CBC with platelets and differential  - Lipase  - Comprehensive metabolic panel (BMP + Alb, Alk Phos, ALT, AST, Total. Bili, TP)    rx for nausea give   metoclopramide (REGLAN) 10 MG tablet; Take 1 tablet (10 mg) by mouth 2 times daily as needed  Dispense: 20 tablet; Refill: 0    Monitor symptoms, if worsening or not improving call or rtc    See Patient Instructions    JONATHAN Pathak [facility]

## 2018-02-05 NOTE — MR AVS SNAPSHOT
After Visit Summary   2/5/2018    Zulay Rossi    MRN: [id number]           Patient Information     Date Of Birth          1980        Visit Information        Provider Department      2/5/2018 1:00 PM Isabella Lundberg APRN CNP [facility]        Today's Diagnoses     Upper abdominal pain    -  1       Follow-ups after your visit        Who to contact     If you have questions or need follow up information about today's clinic visit or your schedule please contact [facility] directly at [phone].  Normal or non-critical lab and imaging results will be communicated to you by "[employer]hart, letter or phone within 4 business days after the clinic has received the results. If you do not hear from us within 7 days, please contact the clinic through [employer] or phone. If you have a critical or abnormal lab result, we will notify you by phone as soon as possible.  Submit refill requests through [employer] or call your pharmacy and they will forward the refill request to us. Please allow 3 business days for your refill to be completed.          Additional Information About Your Visit        MyChart Information     [employer] gives you secure access to your electronic health record. If you see a primary care provider, you can also send messages to your care team and make appointments. If you have questions, please call your primary care clinic.  If you do not have a primary care provider, please call [phone] and they will assist you.        Care EveryWhere ID     This is your Care EveryWhere ID. This could be used by other organizations to access your [facility] medical records  [id number]        Your Vitals Were     Pulse Temperature Respirations Pulse Oximetry Breastfeeding? BMI (Body Mass Index)    88 97.8  F (36.6  C) (Oral) 16 100% No 25.42 kg/m2       Blood Pressure from Last 3 Encounters:   02/05/18 (!) 140/92   01/11/18 134/89   11/02/17 124/82    Weight from Last 3  Encounters:   02/05/18 139 lb (63 kg)   01/11/18 140 lb (63.5 kg)   11/02/17 135 lb (61.2 kg)              We Performed the Following     CBC with platelets and differential     Comprehensive metabolic panel (BMP + Alb, Alk Phos, ALT, AST, Total. Bili, TP)     Lipase          Today's Medication Changes          These changes are accurate as of 2/5/18  1:57 PM.  If you have any questions, ask your nurse or doctor.               These medicines have changed or have updated prescriptions.        Dose/Directions    * metoclopramide 10 MG tablet   Commonly known as:  REGLAN   This may have changed:  Another medication with the same name was added. Make sure you understand how and when to take each.   Changed by:  Isabella Lundberg APRN CNP        Dose:  10 mg   Take 10 mg by mouth   Refills:  0       * metoclopramide 10 MG tablet   Commonly known as:  REGLAN   This may have changed:  You were already taking a medication with the same name, and this prescription was added. Make sure you understand how and when to take each.   Used for:  Upper abdominal pain   Changed by:  Isabella Lundberg APRN CNP        Dose:  10 mg   Take 1 tablet (10 mg) by mouth 2 times daily as needed   Quantity:  20 tablet   Refills:  0       * Notice:  This list has 2 medication(s) that are the same as other medications prescribed for you. Read the directions carefully, and ask your doctor or other care provider to review them with you.         Where to get your medicines      These medications were sent to [facility] [address] [facility], [address]  [address] [address] [facility], [address] [address]     Phone:  [phone]     metoclopramide 10 MG tablet                Primary Care Provider Office Phone # Fax #    Dania Munoz PA-C [phone] [address] [facility] [address]        Equal Access to Services     SAUD LR AH: gail Morillo qaybta  lori marin christelleclemencia turpinneha irwin ah. [facility] [phone].    ATENCIÓN: Si jesika tiwari, tiene a delgado disposición servicios gratuitos de asistencia lingüística. Garrett al [phone].    We comply with applicable federal civil rights laws and [place] laws. We do not discriminate on the basis of race, color, national origin, age, disability, sex, sexual orientation, or gender identity.            Thank you!     Thank you for choosing [facility]  for your care. Our goal is always to provide you with excellent care. Hearing back from our patients is one way we can continue to improve our services. Please take a few minutes to complete the written survey that you may receive in the mail after your visit with us. Thank you!             Your Updated Medication List - Protect others around you: Learn how to safely use, store and throw away your medicines at www.disposemymeds.org.          This list is accurate as of 2/5/18  1:57 PM.  Always use your most recent med list.                   Brand Name Dispense Instructions for use Diagnosis    * albuterol (2.5 MG/3ML) 0.083% neb solution     60 vial    Take 1 vial (2.5 mg) by nebulization every 4 hours as needed for shortness of breath / dyspnea or wheezing    Acute bronchitis with symptoms > 10 days       * albuterol 108 (90 BASE) MCG/ACT Inhaler    PROAIR HFA    18 Inhaler    Inhale 1-2 puffs into the lungs every 4 hours as needed for shortness of breath / dyspnea or wheezing    Acute bronchospasm       amitriptyline 10 MG tablet    ELAVIL    90 tablet    Take 1 tablet (10 mg) by mouth At Bedtime    Idiopathic chronic pancreatitis (H)       amLODIPine 10 MG tablet    NORVASC    90 tablet    TAKE 1 TABLET (10 MG) BY MOUTH DAILY    Benign essential hypertension       CREON 376[address]-62181 UNITS Cpep per EC capsule   Generic drug:  amylase-lipase-protease     120 capsule    TAKE 1 CAPSULE (24,000 UNITS) BY MOUTH 4 TIMES DAILY AS NEEDED  TAKE WITH MEALS AND ONE SNACK    Idiopathic chronic pancreatitis (H)       cyclobenzaprine 5 MG tablet    FLEXERIL    42 tablet    Take 1 tablet (5 mg) by mouth 3 times daily as needed for muscle spasms Avoid driving or operating machinery while on this medication- has drowsy effects.    Rib pain on right side       fenofibrate micronized 200 MG capsule    LOFIBRA    90 capsule    Take 1 capsule (200 mg) by mouth every morning (before breakfast)    Acute pancreatitis, unspecified complication status, unspecified pancreatitis type       gabapentin 300 MG capsule    NEURONTIN    180 capsule    Take 2 capsules (600 mg) by mouth 3 times daily    Idiopathic chronic pancreatitis (H)       LIPITOR 10 MG tablet   Generic drug:  atorvastatin      Take 10 mg by mouth daily        losartan 50 MG tablet    COZAAR    90 tablet    TAKE 1 TABLET (50 MG) BY MOUTH DAILY    Benign essential hypertension       * metoclopramide 10 MG tablet    REGLAN     Take 10 mg by mouth        * metoclopramide 10 MG tablet    REGLAN    20 tablet    Take 1 tablet (10 mg) by mouth 2 times daily as needed    Upper abdominal pain       montelukast 10 MG tablet    SINGULAIR    30 tablet    TAKE 1 TABLET (10 MG) BY MOUTH AT BEDTIME FOR COUGH/ALLERGIES    Cough, Multiple allergies       * nicotine 14 MG/24HR 24 hr patch    NICODERM CQ    45 patch    Place 1 patch onto the skin every 24 hours For  45 days    Tobacco abuse       * nicotine 7 MG/24HR 24 hr patch    NICODERM CQ    14 patch    Place 1 patch onto the skin every 24 hours For two weeks    Tobacco abuse       ondansetron 8 MG tablet    ZOFRAN    18 tablet    Take 1 tablet (8 mg) by mouth every 8 hours as needed for nausea    Nausea       oxyCODONE-acetaminophen 5-325 MG per tablet    PERCOCET    60 tablet    Take 1 tablet by mouth every 6 hours as needed for pain Maximum 4 tablet(s) per day    Chronic pancreatitis, unspecified pancreatitis type (H)       sertraline 100 MG tablet    ZOLOFT    90  tablet    Take 1 tablet (100 mg) by mouth daily    Episode of recurrent major depressive disorder, unspecified depression episode severity (H)       ZANTAC 150 MG tablet   Generic drug:  ranitidine      Take  by mouth daily.        * Notice:  This list has 6 medication(s) that are the same as other medications prescribed for you. Read the directions carefully, and ask your doctor or other care provider to review them with you.

## 2018-02-07 ENCOUNTER — TELEPHONE (OUTPATIENT)
Dept: FAMILY MEDICINE | Facility: CLINIC | Age: 38
End: 2018-02-07

## 2018-02-07 DIAGNOSIS — K86.1 CHRONIC PANCREATITIS, UNSPECIFIED PANCREATITIS TYPE (H): Primary | ICD-10-CM

## 2018-02-07 NOTE — TELEPHONE ENCOUNTER
Pt notified of provider message as written.  Pt states a referral to [facility] in [place] would work for her.  Pt states she was sent to ER by last provider she saw and the ER provider gave her 8 more pills.  Barbara Overton RN

## 2018-02-07 NOTE — TELEPHONE ENCOUNTER
I see that patient was seen 2 days ago and her enzymes were elevated again.  With her ongoing pain needs I feel that now we are at the point where we could use pain management to help and make sure we are treating her pain appropriately.  Does patient have a preference in clinic I refer her to? I would do [facility] if she is willing.   Otherwise [facility] pain would be another option.  Also did she get pain medications from the last provider she saw or is she still out of them?     Dania

## 2018-02-08 RX ORDER — OXYCODONE AND ACETAMINOPHEN 5; 325 MG/1; MG/1
1 TABLET ORAL EVERY 6 HOURS PRN
Qty: 60 TABLET | Refills: 0 | Status: SHIPPED | OUTPATIENT
Start: 2018-02-08 | End: 2018-03-07

## 2018-02-08 NOTE — TELEPHONE ENCOUNTER
Sorry for the confusion.  I will continue to take care of her pain until she sees them.  Since she got some from the most recent provider she saw I am not sure if she is still requesting more.  Thank you, Dania

## 2018-02-08 NOTE — TELEPHONE ENCOUNTER
Are we to ask if she needs a refill of her medication(pain I assume) because you will refill it or that she will need to get that from the pain clinic? Please advise.Susan Ortega MA/PIERCE

## 2018-02-08 NOTE — TELEPHONE ENCOUNTER
Patient called back and I gave her the [facility] phone number. She said that she has enough medication for today, but would need tomorrow if you would refill it.Susan Ortega MA/PIERCE

## 2018-02-08 NOTE — TELEPHONE ENCOUNTER
Referral given.  Does patient still need refill of medications? Have her schedule with pain doctor. Dania Munoz PA-C

## 2018-02-09 ENCOUNTER — TELEPHONE (OUTPATIENT)
Dept: PALLIATIVE MEDICINE | Facility: CLINIC | Age: 38
End: 2018-02-09

## 2018-02-09 NOTE — TELEPHONE ENCOUNTER
Brought RX to the  for . Left message for patient on her voicemail that this was done.Susan Ortega MA/TC

## 2018-02-09 NOTE — LETTER
February 12, 2018    Zulay Rossi  [address] [place] [address]    Dear Zulay                                                                 Welcome to the [facility].  We are located on the 2nd floor ([address]) of the [facility], located at [address] [place] [address] [place] [address].    Your appointment at the [facility] has been scheduled on Friday MARCH 30, 2018 at 1:30 PM with Anthony Gunn MD.    At your first visit, you will meet your team of caregivers who will help you to develop pain management strategies that will last a lifetime. You will meet with our support staff to review your insurance information, and collect your co-payment if required by your insurance company. You will also meet with a medical pain specialist and care coordinator who will assess your pain and develop a plan of care for your successful pain rehabilitation. You should expect to spend 1-2 hours at your first visit with us. Usually, patients work with us for a period of 6-12 months, and eventually return to their primary doctor once their pain management has stabilized.      To help us make your visit go as smoothly as possible, please bring the following items with you on your visit:     Completed Pain Questionnaire enclosed in this packet.  If you do not bring the completed questionnaire, we may have to reschedule your appointment.  List of any medicines that you are currently taking or have been prescribed  Important NON-[facility] medical information such as medical records or tests results (X-rays, or laboratory tests)  Your health insurance card  Financial resources to cover your co-payment or balance due at the time of service (cash, personal check, Visa, and MasterCard are acceptable methods of payment)     Due to the demand for new patient evaluations, you must notify the scheduling department 48 hours in advance if you are not able to keep this appointment. Failure to  do so could affect your ability to reschedule with our clinic. Please be aware that we will not prescribe any medications at your first visit.     Please call [phone] with any questions regarding your appointment. We look forward to meeting you and working to address your health care needs.     Sincerely,    [facility]

## 2018-02-12 NOTE — TELEPHONE ENCOUNTER
Pain Management Center Referral      1. Confirmed address with patient? Yes  2. Confirmed phone number with patient? Yes  3. Confirmed referring provider? Yes  4. Is the PCP the same as the referring provider? Yes  5. Has the patient been to any previous pain clinics? No  (If yes, send CARMINE with welcome letter)  6. Which insurance are we to bill for this appointment?  [employer]    7. Informed pt of cancellation (48 hour) policy? Yes    REGARDING OPIOID MEDICATIONS: We will always address appropriateness of opioid pain medications, but we generally will not automatically take on a prescribing role. When we do take on prescribing of opioids for chronic pain, it is in collaboration with the referring physician for an intermediate period of time (months), with an expectation that the primary physician or provider will assume the prescribing role if medications are effective at stable doses with demonstrated compliance. Therefore, please do not assume that your prescribing responsibilities end on the day of pain clinic consultation.  7. Informed pt of prescribing policy? Yes      8. Referring Provider: Dania Munoz

## 2018-03-07 ENCOUNTER — MYC REFILL (OUTPATIENT)
Dept: FAMILY MEDICINE | Facility: CLINIC | Age: 38
End: 2018-03-07

## 2018-03-07 DIAGNOSIS — K86.1 CHRONIC PANCREATITIS, UNSPECIFIED PANCREATITIS TYPE (H): ICD-10-CM

## 2018-03-07 RX ORDER — OXYCODONE AND ACETAMINOPHEN 5; 325 MG/1; MG/1
1 TABLET ORAL EVERY 6 HOURS PRN
Qty: 60 TABLET | Refills: 0 | Status: CANCELLED | OUTPATIENT
Start: 2018-03-07

## 2018-03-07 NOTE — TELEPHONE ENCOUNTER
Message from [employer]:  Original authorizing provider: KIMBERLY Sanders would like a refill of the following medications:  oxyCODONE-acetaminophen (PERCOCET) 5-325 MG per tablet [Dania Munoz PA-C]    Preferred pharmacy: [facility]/PHARMACY #[id number] - [facility] [address],  AT [address] [facility]    Comment:

## 2018-03-07 NOTE — TELEPHONE ENCOUNTER
Controlled Substance Refill Request for percocet  Problem List Complete:  No       PROVIDER TO CONSIDER COMPLETION OF PROBLEM LIST AND OVERVIEW/CONTROLLED SUBSTANCE AGREEMENT      Last Written Prescription Date:  2/8/18  Last Fill Quantity: 60,   # refills: 0      Last Office Visit with [facility] primary care provider: 1/11/18      Future Office visit:       Controlled substance agreement on file: Yes:  Date 4/18/17.      Processing:  Patient will  in clinic       checked in past 6 months?  Yes 11/28/17   Barbara Overton RN

## 2018-03-07 NOTE — TELEPHONE ENCOUNTER
Message from [employer]:  Original authorizing provider: KIMBERLY Sanders would like a refill of the following medications:  oxyCODONE-acetaminophen (PERCOCET) 5-325 MG per tablet [Dania Munoz PA-C]    Preferred pharmacy: [facility]/PHARMACY #[id number] [facility] [address],  AT [address] [facility]    Comment:  Seeing Pain Clinic on 3/30.

## 2018-03-08 ENCOUNTER — MYC REFILL (OUTPATIENT)
Dept: FAMILY MEDICINE | Facility: CLINIC | Age: 38
End: 2018-03-08

## 2018-03-08 DIAGNOSIS — J98.01 ACUTE BRONCHOSPASM: ICD-10-CM

## 2018-03-08 RX ORDER — ALBUTEROL SULFATE 90 UG/1
AEROSOL, METERED RESPIRATORY (INHALATION)
Qty: 8.5 INHALER | Refills: 4 | OUTPATIENT
Start: 2018-03-08

## 2018-03-08 RX ORDER — OXYCODONE AND ACETAMINOPHEN 5; 325 MG/1; MG/1
1 TABLET ORAL EVERY 8 HOURS PRN
Qty: 30 TABLET | Refills: 0 | Status: SHIPPED | OUTPATIENT
Start: 2018-03-08 | End: 2018-04-05

## 2018-03-08 RX ORDER — ALBUTEROL SULFATE 90 UG/1
1-2 AEROSOL, METERED RESPIRATORY (INHALATION) EVERY 4 HOURS PRN
Qty: 18 INHALER | Refills: 4 | Status: CANCELLED | OUTPATIENT
Start: 2018-03-08

## 2018-03-08 NOTE — TELEPHONE ENCOUNTER
Message from [employer]:  Original authorizing provider: KIMBERLY Sanders would like a refill of the following medications:  albuterol (PROAIR HFA) 108 (90 BASE) MCG/ACT Inhaler [Dania Munoz PA-C]    Preferred pharmacy: [facility]/PHARMACY #[id number] [place], [place] - [address],  AT CORNER [facility]    Comment:

## 2018-03-08 NOTE — TELEPHONE ENCOUNTER
Please have another provider sign as I am out ill today.     I am glad she has pain clinic appt scheduled.     Dania

## 2018-03-15 ENCOUNTER — E-VISIT (OUTPATIENT)
Dept: FAMILY MEDICINE | Facility: CLINIC | Age: 38
End: 2018-03-15
Payer: COMMERCIAL

## 2018-03-15 DIAGNOSIS — K86.1 CHRONIC PANCREATITIS, UNSPECIFIED PANCREATITIS TYPE (H): ICD-10-CM

## 2018-03-15 DIAGNOSIS — K86.1 IDIOPATHIC CHRONIC PANCREATITIS (H): ICD-10-CM

## 2018-03-15 DIAGNOSIS — K85.00 IDIOPATHIC ACUTE PANCREATITIS, UNSPECIFIED COMPLICATION STATUS: Primary | ICD-10-CM

## 2018-03-15 LAB — LIPASE SERPL-CCNC: 128 U/L (ref 73–393)

## 2018-03-15 PROCEDURE — 36415 COLL VENOUS BLD VENIPUNCTURE: CPT | Performed by: PHYSICIAN ASSISTANT

## 2018-03-15 PROCEDURE — 83690 ASSAY OF LIPASE: CPT | Performed by: PHYSICIAN ASSISTANT

## 2018-03-15 PROCEDURE — 99444 ZZC PHYSICIAN ONLINE EVALUATION & MANAGEMENT SERVICE: CPT | Performed by: PHYSICIAN ASSISTANT

## 2018-03-15 NOTE — LETTER
[facility]  [address] [address]  Phone: [phone]    March 20, 2018        Zulay Rossi  [address] [facility] [address]          To whom it may concern:    RE: Zulay Rossi    Patient missed work on 3-15-18 for illness and should be excused.         Sincerely,        Dania Munoz PA-C

## 2018-03-15 NOTE — MR AVS SNAPSHOT
After Visit Summary   3/15/2018    Zulay Rossi    MRN: [id number]           Patient Information     Date Of Birth          1980        Visit Information        Provider Department      3/15/2018 11:18 AM Dania Munoz PA-C [facility]        Today's Diagnoses     Idiopathic acute pancreatitis, unspecified complication status    -  1       Follow-ups after your visit        Your next 10 appointments already scheduled     Mar 30, 2018  1:30 PM CDT   New Visit with Yovani Gunn MD   [facility] ([facility])    [address] [facility] [address] [phone]              Who to contact     If you have questions or need follow up information about today's clinic visit or your schedule please contact [facility] directly at [phone].  Normal or non-critical lab and imaging results will be communicated to you by MyChart, letter or phone within 4 business days after the clinic has received the results. If you do not hear from us within 7 days, please contact the clinic through MyChart or phone. If you have a critical or abnormal lab result, we will notify you by phone as soon as possible.  Submit refill requests through Vetr or call your pharmacy and they will forward the refill request to us. Please allow 3 business days for your refill to be completed.          Additional Information About Your Visit        MyChart Information     Vetr gives you secure access to your electronic health record. If you see a primary care provider, you can also send messages to your care team and make appointments. If you have questions, please call your primary care clinic.  If you do not have a primary care provider, please call [phone] and they will assist you.        Care EveryWhere ID     This is your Care EveryWhere ID. This could be used by other organizations to access your [facility]  records  [id number]         Blood Pressure from Last 3 Encounters:   02/05/18 (!) 140/92   01/11/18 134/89   11/02/17 124/82    Weight from Last 3 Encounters:   02/05/18 139 lb (63 kg)   01/11/18 140 lb (63.5 kg)   11/02/17 135 lb (61.2 kg)              Today, you had the following     No orders found for display       Primary Care Provider Office Phone # Fax #    Dania Munoz PA-C [phone] [address] [facility] [address]        Equal Access to Services     [facility]: Hadii christopher Sky, wafranchescada jake, qaybta kaalmada steven, lori irwin . So [facility] [phone].    ATENCIÓN: Si habla español, tiene a delgado disposición servicios gratuitos de asistencia lingüística. [facility] [phone].    We comply with applicable federal civil rights laws and [place] laws. We do not discriminate on the basis of race, color, national origin, age, disability, sex, sexual orientation, or gender identity.            Thank you!     Thank you for choosing [facility]  for your care. Our goal is always to provide you with excellent care. Hearing back from our patients is one way we can continue to improve our services. Please take a few minutes to complete the written survey that you may receive in the mail after your visit with us. Thank you!             Your Updated Medication List - Protect others around you: Learn how to safely use, store and throw away your medicines at www.disposemymeds.org.          This list is accurate as of 3/15/18 11:59 PM.  Always use your most recent med list.                   Brand Name Dispense Instructions for use Diagnosis    * albuterol (2.5 MG/3ML) 0.083% neb solution     60 vial    Take 1 vial (2.5 mg) by nebulization every 4 hours as needed for shortness of breath / dyspnea or wheezing    Acute bronchitis with symptoms > 10 days       * albuterol 108 (90 BASE) MCG/ACT Inhaler    PROAIR HFA    18  Inhaler    Inhale 1-2 puffs into the lungs every 4 hours as needed for shortness of breath / dyspnea or wheezing    Acute bronchospasm       amitriptyline 10 MG tablet    ELAVIL    90 tablet    Take 1 tablet (10 mg) by mouth At Bedtime    Idiopathic chronic pancreatitis (H)       amLODIPine 10 MG tablet    NORVASC    90 tablet    TAKE 1 TABLET (10 MG) BY MOUTH DAILY    Benign essential hypertension       CREON 36590-54190 UNITS Cpep per EC capsule   Generic drug:  amylase-lipase-protease     120 capsule    TAKE 1 CAPSULE (24,000 UNITS) BY MOUTH 4 TIMES DAILY AS NEEDED TAKE WITH MEALS AND ONE SNACK    Idiopathic chronic pancreatitis (H)       cyclobenzaprine 5 MG tablet    FLEXERIL    42 tablet    Take 1 tablet (5 mg) by mouth 3 times daily as needed for muscle spasms Avoid driving or operating machinery while on this medication- has drowsy effects.    Rib pain on right side       fenofibrate micronized 200 MG capsule    LOFIBRA    90 capsule    Take 1 capsule (200 mg) by mouth every morning (before breakfast)    Acute pancreatitis, unspecified complication status, unspecified pancreatitis type       gabapentin 300 MG capsule    NEURONTIN    180 capsule    Take 2 capsules (600 mg) by mouth 3 times daily    Idiopathic chronic pancreatitis (H)       LIPITOR 10 MG tablet   Generic drug:  atorvastatin      Take 10 mg by mouth daily        losartan 50 MG tablet    COZAAR    90 tablet    TAKE 1 TABLET (50 MG) BY MOUTH DAILY    Benign essential hypertension       * metoclopramide 10 MG tablet    REGLAN     Take 10 mg by mouth        * metoclopramide 10 MG tablet    REGLAN    20 tablet    Take 1 tablet (10 mg) by mouth 2 times daily as needed    Upper abdominal pain       montelukast 10 MG tablet    SINGULAIR    30 tablet    TAKE 1 TABLET (10 MG) BY MOUTH AT BEDTIME FOR COUGH/ALLERGIES    Cough, Multiple allergies       * nicotine 14 MG/24HR 24 hr patch    NICODERM CQ    45 patch    Place 1 patch onto the skin every 24 hours  For  45 days    Tobacco abuse       * nicotine 7 MG/24HR 24 hr patch    NICODERM CQ    14 patch    Place 1 patch onto the skin every 24 hours For two weeks    Tobacco abuse       ondansetron 8 MG tablet    ZOFRAN    18 tablet    Take 1 tablet (8 mg) by mouth every 8 hours as needed for nausea    Nausea       oxyCODONE-acetaminophen 5-325 MG per tablet    PERCOCET    30 tablet    Take 1 tablet by mouth every 8 hours as needed for pain Maximum 4 tablet(s) per day    Chronic pancreatitis, unspecified pancreatitis type (H)       sertraline 100 MG tablet    ZOLOFT    90 tablet    Take 1 tablet (100 mg) by mouth daily    Episode of recurrent major depressive disorder, unspecified depression episode severity (H)       ZANTAC 150 MG tablet   Generic drug:  ranitidine      Take  by mouth daily.        * Notice:  This list has 6 medication(s) that are the same as other medications prescribed for you. Read the directions carefully, and ask your doctor or other care provider to review them with you.

## 2018-03-16 NOTE — PROGRESS NOTES
Efrain Biswas,       Your recent test results are attached, if you have any questions or concerns please feel free to contact me via e-mail or call [phone]. Normal lipase.     Sincerely,  Dania Munoz PA-C

## 2018-03-19 ENCOUNTER — TRANSFERRED RECORDS (OUTPATIENT)
Dept: HEALTH INFORMATION MANAGEMENT | Facility: CLINIC | Age: 38
End: 2018-03-19

## 2018-03-26 ENCOUNTER — TELEPHONE (OUTPATIENT)
Dept: CARE COORDINATION | Facility: CLINIC | Age: 38
End: 2018-03-26

## 2018-03-26 DIAGNOSIS — E87.20 LACTIC ACID ACIDOSIS: Primary | ICD-10-CM

## 2018-03-26 NOTE — TELEPHONE ENCOUNTER
DC'd from [facility] on 3/23/18 to Home self care   Primary Problem: Lactic acid acidosis  LACE: 58 High

## 2018-03-27 ENCOUNTER — PATIENT OUTREACH (OUTPATIENT)
Dept: CARE COORDINATION | Facility: CLINIC | Age: 38
End: 2018-03-27

## 2018-03-27 NOTE — LETTER
[place] CARE COORDINATION   [facility]:   [address] [place] [address]  [phone]    March 27, 2018    Zulay Rossi  [address] [place] [address]      Dear Zulay,    I am a clinic care coordinator who works with Dania Munoz PA-C at [facility]. I recently tried to call and was unable to reach you. I wanted to introduce myself and provide you with my contact information so that you can call me with questions or concerns about your health care. Below is a description of clinic care coordination and how I can further assist you.     The clinic care coordinator is a registered nurse and/or  who understand the health care system. The goal of clinic care coordination is to help you manage your health and improve access to the [facility] system in the most efficient manner. The registered nurse can assist you in meeting your health care goals by providing education, coordinating services, and strengthening the communication among your providers. The  can assist you with financial, behavioral, psychosocial, chemical dependency, counseling, and/or psychiatric resources.    Please feel free to contact me at [phone], [phone], with any questions or concerns. We at [facility] are focused on providing you with the highest-quality healthcare experience possible and that all starts with you.     Sincerely,     Leoncio Vigil RN  Clinic Care Coordinator    Enclosed: I have enclosed a copy of a 24 Hour Access Plan. This has helpful phone numbers for you to call when needed. Please keep this in an easy to access place to use as needed.

## 2018-03-27 NOTE — LETTER
Health Care Home - Access Care Plan    About Me  Patient Name:  Zulay Rossi    YOB: 1980  Age:                             38 year old   [place] MRN:            [id number] Telephone Information:     Home Phone [phone]   Mobile [phone]   Home Phone [phone]       Address:    [address] [place] [address] [place] [address] Email address:  [email or web address]      Emergency Contact(s)  Name Relationship Lgl Grd Work Phone Home Phone Mobile Phone   SLAVA OLIVARES Brother    [phone]             Health Maintenance:      My Access Plan  Medical Emergency 911   Questions or concerns during clinic hours Primary Clinic Line, I will call the clinic directly: [facility] - [phone]   24 Hour Appointment Line [phone] or  [phone]) (toll free)   24 Hour Nurse Line [phone] (toll free)   Questions or concerns outside clinic hours 24 Hour Appointment Line, I will call the after-hours on-call line:   [facility] [phone] or [phone]) (toll-free)   Preferred Urgent Care [facility], [phone]   Preferred Hospital [facility]  [phone]   Preferred Pharmacy [facility] #[id number] [place], [place] - [address],  AT [address] [facility]     Behavioral Health Crisis Line The National Suicide Prevention Lifeline at 1-124.626.5801 or 911     My Care Team Members  Patient Care Team       Relationship Specialty Notifications Start End    Dania Munoz PA-C PCP - General Physician Assistant  3/19/15     Phone: [phone] Fax: [phone] [address] [facility] [address]    Dania Munoz PA-C Physician Assistant Physician Assistant  3/19/15     Phone: [phone] Fax: [phone] [address] [facility] [address]        My Medical and Care Information  Problem List   Patient Active Problem List   Diagnosis     Mood disorder (H)     Headache      Goiter, non-toxic     Acute gouty arthritis     Elevated uric acid in blood     Ovarian cyst     S/P LEEP of cervix     Tobacco abuse     Acute bronchospasm     Benign essential hypertension     Controlled substance agreement signed     Chronic pain syndrome     Health Care Home     Chronic pancreatitis, unspecified pancreatitis type (H)     Idiopathic chronic pancreatitis (H)     Fatty infiltration of liver      Current Medications and Allergies:  See printed Medication Report

## 2018-03-27 NOTE — PROGRESS NOTES
Clinic Care Coordination Contact  [facility]/Voicemail       Clinical Data: Care Coordinator Outreach  Outreach attempted x 1.  Left message on voicemail with call back information and requested return call.  Plan: Care Coordinator will mail out care coordination introduction letter with care coordinator contact information and explanation of care coordination services. Care Coordinator will try to reach patient again in 1-2 business days.  Leoncio Vigil RN  Clinic Care Coordinator  [phone] or [phone]

## 2018-03-28 ENCOUNTER — MYC REFILL (OUTPATIENT)
Dept: FAMILY MEDICINE | Facility: CLINIC | Age: 38
End: 2018-03-28

## 2018-03-28 DIAGNOSIS — K86.1 CHRONIC PANCREATITIS, UNSPECIFIED PANCREATITIS TYPE (H): ICD-10-CM

## 2018-03-28 NOTE — TELEPHONE ENCOUNTER
Message from [employer]:  Original authorizing provider: KIMBERLY Sanders would like a refill of the following medications:  oxyCODONE-acetaminophen (PERCOCET) 5-325 MG per tablet [Dania Munoz PA-C]    Preferred pharmacy: [id number] - [facility] [address],  [address] [facility]    Comment:

## 2018-03-28 NOTE — TELEPHONE ENCOUNTER
Controlled Substance Refill Request for percocet  Problem List Complete:  No       PROVIDER TO CONSIDER COMPLETION OF PROBLEM LIST AND OVERVIEW/CONTROLLED SUBSTANCE AGREEMENT      Last Written Prescription Date:  3/8/18  Last Fill Quantity: 30,   # refills: 0      [facility] Office Visit with [facility] care provider: 1/11/18      Future Office visit:       Controlled substance agreement on file: Yes:  Date 4/18/17.      Processing:  Patient will  in clinic       checked in past 6 months?  Yes 11/28/17   Barbara Overton RN

## 2018-03-30 ENCOUNTER — OFFICE VISIT (OUTPATIENT)
Dept: PALLIATIVE MEDICINE | Facility: CLINIC | Age: 38
End: 2018-03-30
Payer: COMMERCIAL

## 2018-03-30 VITALS
WEIGHT: 143 LBS | SYSTOLIC BLOOD PRESSURE: 152 MMHG | HEART RATE: 84 BPM | HEIGHT: 62 IN | BODY MASS INDEX: 26.31 KG/M2 | DIASTOLIC BLOOD PRESSURE: 98 MMHG

## 2018-03-30 DIAGNOSIS — Z79.891 ENCOUNTER FOR LONG-TERM OPIATE ANALGESIC USE: ICD-10-CM

## 2018-03-30 DIAGNOSIS — K86.1 CHRONIC PANCREATITIS, UNSPECIFIED PANCREATITIS TYPE (H): Primary | ICD-10-CM

## 2018-03-30 PROCEDURE — 99000 SPECIMEN HANDLING OFFICE-LAB: CPT | Performed by: PAIN MEDICINE

## 2018-03-30 PROCEDURE — 80307 DRUG TEST PRSMV CHEM ANLYZR: CPT | Mod: 90 | Performed by: PAIN MEDICINE

## 2018-03-30 PROCEDURE — 99205 OFFICE O/P NEW HI 60 MIN: CPT | Performed by: PAIN MEDICINE

## 2018-03-30 RX ORDER — OXYCODONE AND ACETAMINOPHEN 5; 325 MG/1; MG/1
1 TABLET ORAL EVERY 8 HOURS PRN
Qty: 30 TABLET | Refills: 0 | OUTPATIENT
Start: 2018-03-30

## 2018-03-30 ASSESSMENT — PAIN SCALES - GENERAL: PAINLEVEL: MODERATE PAIN (4)
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
## 2018-03-30 NOTE — NURSING NOTE
"Chief Complaint   Patient presents with     Pain     Chronic pancreatitis        Initial BP (!) 152/98  Pulse 84  Ht 1.575 m (5' 2\")  Wt 64.9 kg (143 lb)  BMI 26.16 kg/m2 Estimated body mass index is 26.16 kg/(m^2) as calculated from the following:    Height as of this encounter: 1.575 m (5' 2\").    Weight as of this encounter: 64.9 kg (143 lb).  Medication Reconciliation: complete     Escobar Castillo MA      "

## 2018-03-30 NOTE — Clinical Note
Place recommendations.  Discussed at length goal of avoiding ED visits.  I do think it is reasonable to provide a short course of opioids when having a flare.  Made it clear that I would not recommend opioids on a daily basis.

## 2018-03-30 NOTE — MR AVS SNAPSHOT
After Visit Summary   3/30/2018    Zulay Rossi    MRN: [id number]           Patient Information     Date Of Birth          1980        Visit Information        Provider Department      3/30/2018 1:30 PM Yovani Gunn MD [facility] Instructions      - Further procedures recommended:    Would not recommend at this time   - Medication Management: Will place recommendations for PCP    - Would increase elavil to 25mg at night   - Continue gabapentin    - Would consider adding zanaflex 2mg as needed when abdominal pain is severe    - Discussed trial of antioxidants   - Continue enzyme replacement    - For opioids I would recommend using when patient is having a flair instead of on a daily bases. Reasonable to take Percocet 5-325mg  up to three times a day for 4 days when having a flair. Would avoid taking for just baseline symptoms. Overall goal to prevent ED visits. Reasonable to provide 12  tabs a months, given having approximately 1 exacerbation a month.+    - Physical Therapy: consider in the future   - Urine toxicology screen today:  Today   - Follow up: Will discuss with PCP about plan to proceed.    - Discussed the importance of  avoidance  of alcohol and smoking       ----------------------------------------------------------------  Nurse Triage line:  [phone]   Call this number with any questions or concerns. You may leave a detailed message anytime. Calls are typically returned Monday through Friday between 8 AM and 4:30 PM. We usually get back to you within 2 business days depending on the issue/request.       Medication refills:    For non-narcotic medications, call your pharmacy directly to request a refill. The pharmacy will contact the [facility] for authorization. Please allow 3-4 days for these refills to be processed.     For narcotic refills, call the nurse triage line or send a REEL Qualified message. Please contact us 7-10 days  before your refill is due. The message MUST include the name of the specific medication(s) requested and how you would like to receive the prescription(s). The options are as follows:    Pain Clinic staff can mail the prescription to your pharmacy. Please tell us the name of the pharmacy.    You may pick the prescription up at the Pain Clinic (tell us the location) or during a clinic visit with your pain provider    Pain Clinic staff can deliver the prescription to the [facility] pharmacy in the clinic building. Please tell us the location.      Scheduling number: [phone].  Call this number to schedule or change appointments.    We believe regular attendance is key to your success in our program.    Any time you are unable to keep your appointment we ask that you call us at least 24 hours in advance to let us know. This will allow us to offer the appointment time to another patient.               Follow-ups after your visit        Who to contact     If you have questions or need follow up information about today's clinic visit or your schedule please contact [facility] directly at [phone].  Normal or non-critical lab and imaging results will be communicated to you by Mic Networkhart, letter or phone within 4 business days after the clinic has received the results. If you do not hear from us within 7 days, please contact the clinic through [employer] Networkhart or phone. If you have a critical or abnormal lab result, we will notify you by phone as soon as possible.  Submit refill requests through [employer] or call your pharmacy and they will forward the refill request to us. Please allow 3 business days for your refill to be completed.          Additional Information About Your Visit        [employer] Information     [employer] gives you secure access to your electronic health record. If you see a primary care provider, you can also send messages to your care team and make appointments. If you have questions, please call your  "primary care clinic.  If you do not have a primary care provider, please call [phone] and they will assist you.        Care EveryWhere ID     This is your Care EveryWhere ID. This could be used by other organizations to access your [place] medical records  [id number]        Your Vitals Were     Pulse Height BMI (Body Mass Index)             84 1.575 m (5' 2\") 26.16 kg/m2          Blood Pressure from Last 3 Encounters:   03/30/18 (!) 152/98   02/05/18 (!) 140/92   01/11/18 134/89    Weight from Last 3 Encounters:   03/30/18 64.9 kg (143 lb)   02/05/18 63 kg (139 lb)   01/11/18 63.5 kg (140 lb)              Today, you had the following     No orders found for display       Primary Care Provider Office Phone # Fax #    Dania Munoz PA-C [phone] [address] [facility] [address]        Equal Access to Services     [facility] Center[facility] : Hadii aad ku hadasho Soomaali, waaxda luqadaha, qaybta kaalmada adeegyada, lori irwin . So [facility] [phone].    ATENCIÓN: Si habla español, tiene a delgado disposición servicios gratuitos de asistencia lingüística. Llame al [phone].    We comply with applicable federal civil rights laws and [place] laws. We do not discriminate on the basis of race, color, national origin, age, disability, sex, sexual orientation, or gender identity.            Thank you!     Thank you for choosing [facility]  for your care. Our goal is always to provide you with excellent care. Hearing back from our patients is one way we can continue to improve our services. Please take a few minutes to complete the written survey that you may receive in the mail after your visit with us. Thank you!             Your Updated Medication List - Protect others around you: Learn how to safely use, store and throw away your medicines at www.disposemymeds.org.          This list is accurate as of 3/30/18  2:07 PM.  Always use your most " recent med list.                   Brand Name Dispense Instructions for use Diagnosis    * albuterol (2.5 MG/3ML) 0.083% neb solution     60 vial    Take 1 vial (2.5 mg) by nebulization every 4 hours as needed for shortness of breath / dyspnea or wheezing    Acute bronchitis with symptoms > 10 days       * albuterol 108 (90 BASE) MCG/ACT Inhaler    PROAIR HFA    18 Inhaler    Inhale 1-2 puffs into the lungs every 4 hours as needed for shortness of breath / dyspnea or wheezing    Acute bronchospasm       amitriptyline 10 MG tablet    ELAVIL    90 tablet    Take 1 tablet (10 mg) by mouth At Bedtime    Idiopathic chronic pancreatitis (H)       amLODIPine 10 MG tablet    NORVASC    90 tablet    TAKE 1 TABLET (10 MG) BY MOUTH DAILY    Benign essential hypertension       CREON 09360-96323 UNITS Cpep per EC capsule   Generic drug:  amylase-lipase-protease     120 capsule    TAKE 1 CAPSULE (24,000 UNITS) BY MOUTH 4 TIMES DAILY AS NEEDED TAKE WITH MEALS AND ONE SNACK    Idiopathic chronic pancreatitis (H)       cyclobenzaprine 5 MG tablet    FLEXERIL    42 tablet    Take 1 tablet (5 mg) by mouth 3 times daily as needed for muscle spasms Avoid driving or operating machinery while on this medication- has drowsy effects.    Rib pain on right side       fenofibrate micronized 200 MG capsule    LOFIBRA    90 capsule    Take 1 capsule (200 mg) by mouth every morning (before breakfast)    Acute pancreatitis, unspecified complication status, unspecified pancreatitis type       gabapentin 300 MG capsule    NEURONTIN    180 capsule    Take 2 capsules (600 mg) by mouth 3 times daily    Idiopathic chronic pancreatitis (H)       LIPITOR 10 MG tablet   Generic drug:  atorvastatin      Take 10 mg by mouth daily        losartan 50 MG tablet    COZAAR    90 tablet    TAKE 1 TABLET (50 MG) BY MOUTH DAILY    Benign essential hypertension       * metoclopramide 10 MG tablet    REGLAN     Take 10 mg by mouth        * metoclopramide 10 MG tablet     REGLAN    20 tablet    Take 1 tablet (10 mg) by mouth 2 times daily as needed    Upper abdominal pain       montelukast 10 MG tablet    SINGULAIR    30 tablet    TAKE 1 TABLET (10 MG) BY MOUTH AT BEDTIME FOR COUGH/ALLERGIES    Cough, Multiple allergies       * nicotine 14 MG/24HR 24 hr patch    NICODERM CQ    45 patch    Place 1 patch onto the skin every 24 hours For  45 days    Tobacco abuse       * nicotine 7 MG/24HR 24 hr patch    NICODERM CQ    14 patch    Place 1 patch onto the skin every 24 hours For two weeks    Tobacco abuse       ondansetron 8 MG tablet    ZOFRAN    18 tablet    Take 1 tablet (8 mg) by mouth every 8 hours as needed for nausea    Nausea       oxyCODONE-acetaminophen 5-325 MG per tablet    PERCOCET    30 tablet    Take 1 tablet by mouth every 8 hours as needed for pain Maximum 4 tablet(s) per day    Chronic pancreatitis, unspecified pancreatitis type (H)       sertraline 100 MG tablet    ZOLOFT    90 tablet    Take 1 tablet (100 mg) by mouth daily    Episode of recurrent major depressive disorder, unspecified depression episode severity (H)       ZANTAC 150 MG tablet   Generic drug:  ranitidine      Take  by mouth daily.        * Notice:  This list has 6 medication(s) that are the same as other medications prescribed for you. Read the directions carefully, and ask your doctor or other care provider to review them with you.

## 2018-03-30 NOTE — PATIENT INSTRUCTIONS
- Further procedures recommended:    Would not recommend at this time   - Medication Management: Will place recommendations for PCP    - Would increase elavil to 25mg at night   - Continue gabapentin    - Would consider adding zanaflex 2mg as needed when abdominal pain is severe    - Discussed trial of antioxidants   - Continue enzyme replacement    - For opioids I would recommend using when patient is having a flair instead of on a daily bases. Reasonable to take Percocet 5-325mg  up to three times a day for 3-4 days when having a flair. Would avoid taking for just baseline symptoms. Overall goal to prevent ED visits. Reasonable to provide 12 tabs a months, given having approximately 1 exacerbation a month.    - Physical Therapy: consider in the future   - Urine toxicology screen today:  Today   - Follow up: Will discuss with PCP about plan to proceed.    - Discussed the importance of avoidance of alcohol and smoking     ----------------------------------------------------------------  Nurse Triage line:  [phone]   Call this number with any questions or concerns. You may leave a detailed message anytime. Calls are typically returned Monday through Friday between 8 AM and 4:30 PM. We usually get back to you within 2 business days depending on the issue/request.       Medication refills:    For non-narcotic medications, call your pharmacy directly to request a refill. The pharmacy will contact the [facility] for authorization. Please allow 3-4 days for these refills to be processed.     For narcotic refills, call the nurse triage line or send a Axela message. Please contact us 7-10 days before your refill is due. The message MUST include the name of the specific medication(s) requested and how you would like to receive the prescription(s). The options are as follows:    Pain Clinic staff can mail the prescription to your pharmacy. Please tell us the name of the pharmacy.    You may pick the  prescription up at the Pain Clinic (tell us the location) or during a clinic visit with your pain provider    Pain Clinic staff can deliver the prescription to the [facility] pharmacy in the clinic building. Please tell us the location.      Scheduling number: [phone].  Call this number to schedule or change appointments.    We believe regular attendance is key to your success in our program.    Any time you are unable to keep your appointment we ask that you call us at least 24 hours in advance to let us know. This will allow us to offer the appointment time to another patient.

## 2018-03-31 ASSESSMENT — PATIENT HEALTH QUESTIONNAIRE - PHQ9: SUM OF ALL RESPONSES TO PHQ QUESTIONS 1-9: 3

## 2018-04-02 ENCOUNTER — TELEPHONE (OUTPATIENT)
Dept: CARE COORDINATION | Facility: CLINIC | Age: 38
End: 2018-04-02

## 2018-04-02 ENCOUNTER — TRANSFERRED RECORDS (OUTPATIENT)
Dept: HEALTH INFORMATION MANAGEMENT | Facility: CLINIC | Age: 38
End: 2018-04-02

## 2018-04-02 DIAGNOSIS — Z71.89 COMPLEX CARE COORDINATION: Primary | ICD-10-CM

## 2018-04-02 NOTE — TELEPHONE ENCOUNTER
DC'd from [facility] on 4-1-18 to home self care   Primary Problem: chronic abdominal pain  LACE: 55 moderate

## 2018-04-03 ENCOUNTER — PATIENT OUTREACH (OUTPATIENT)
Dept: CARE COORDINATION | Facility: CLINIC | Age: 38
End: 2018-04-03

## 2018-04-03 DIAGNOSIS — R05.9 COUGH: ICD-10-CM

## 2018-04-03 DIAGNOSIS — R11.0 NAUSEA: ICD-10-CM

## 2018-04-03 DIAGNOSIS — K86.1 IDIOPATHIC CHRONIC PANCREATITIS (H): ICD-10-CM

## 2018-04-03 DIAGNOSIS — Z88.9 MULTIPLE ALLERGIES: ICD-10-CM

## 2018-04-03 LAB — PAIN DRUG SCR UR W RPTD MEDS: NORMAL

## 2018-04-03 RX ORDER — PANCRELIPASE 24000; 76000; 120000 [USP'U]/1; [USP'U]/1; [USP'U]/1
CAPSULE, DELAYED RELEASE PELLETS ORAL
Qty: 120 CAPSULE | Refills: 1 | Status: SHIPPED | OUTPATIENT
Start: 2018-04-03 | End: 2018-06-03

## 2018-04-03 RX ORDER — MONTELUKAST SODIUM 10 MG/1
TABLET ORAL
Qty: 30 TABLET | Refills: 3 | Status: SHIPPED | OUTPATIENT
Start: 2018-04-03 | End: 2018-12-24

## 2018-04-03 RX ORDER — ONDANSETRON 8 MG/1
TABLET, FILM COATED ORAL
Qty: 18 TABLET | Refills: 3 | Status: SHIPPED | OUTPATIENT
Start: 2018-04-03 | End: 2019-01-16

## 2018-04-03 NOTE — PROGRESS NOTES
Clinic Care Coordination Contact 4/3/18  [facility]/Brenda       Clinical Data: Care Coordinator Outreach to f/u with ED visit.  Outreach attempted x 1.  Left message on [email or web address] with call back information and requested return call.  Plan:  Care Coordinator will try to reach patient again in 1-2 business days.    UPDATE: Pt returned my phone call and states she is currently admitted in the hospital as she went to the ED again lastnight. She asked if her PCP was going to refill her prescriptions and states she sent a request last week but hasn't heard back. Chart review indicates that her PCP may want to see her prior to prescribing more medication as the last time she was in was 11/2017.      will send a message to her provider. I encouraged the pt to reach out to me in the future should she have any questions/concerns.    Berta Coombs, [facility]  Care Coordinator Social Work    [facility] Mikhail Martines and Sherine  [phone]  4/3/2018 1:25 PM

## 2018-04-03 NOTE — TELEPHONE ENCOUNTER
Prescription approved per [facility] Refill Protocol.    Lisa Yepez RN

## 2018-04-05 ENCOUNTER — TELEPHONE (OUTPATIENT)
Dept: FAMILY MEDICINE | Facility: CLINIC | Age: 38
End: 2018-04-05

## 2018-04-05 DIAGNOSIS — G89.4 CHRONIC PAIN SYNDROME: ICD-10-CM

## 2018-04-05 DIAGNOSIS — K86.1 CHRONIC PANCREATITIS, UNSPECIFIED PANCREATITIS TYPE (H): ICD-10-CM

## 2018-04-05 RX ORDER — OXYCODONE AND ACETAMINOPHEN 5; 325 MG/1; MG/1
1 TABLET ORAL EVERY 8 HOURS PRN
Qty: 12 TABLET | Refills: 0 | Status: SHIPPED | OUTPATIENT
Start: 2018-04-05 | End: 2018-05-30

## 2018-04-05 NOTE — TELEPHONE ENCOUNTER
Controlled Substance Refill Request for percocet  Problem List Complete:  No       PROVIDER TO CONSIDER COMPLETION OF PROBLEM LIST AND OVERVIEW/CONTROLLED SUBSTANCE AGREEMENT      Last Written Prescription Date:  3/8/18  Last Fill Quantity: 30,   # refills: 0      Last Office Visit with [facility] primary care provider: 1/11/18      Future Office visit:       Controlled substance agreement on file: Yes:  Date 4/18/17.      Processing:  Patient will  in clinic       checked in past 6 months?  Yes 11/28/17   Barbara Overton RN

## 2018-04-05 NOTE — TELEPHONE ENCOUNTER
Pt notified of provider message as written. Pt did get pain med from surgeon so does not need this refill.  Pt verbalized good understanding.  Appointment made for next week.  Barbara Overton RN

## 2018-04-05 NOTE — TELEPHONE ENCOUNTER
Left message on answering machine for patient to call back to [phone].  Barbara Overton RN

## 2018-04-05 NOTE — TELEPHONE ENCOUNTER
Refill request for percocet needed today.  Call when script is ready.  She will be discharged from [facility] today.

## 2018-04-05 NOTE — TELEPHONE ENCOUNTER
Per pain clinic referral, ok for 12 tabs per month.  12 will be given.  She should also schedule a f/u with me before needing more to go over her pain clinic visit.  She also needs to quit smoking as that worsens pancreatitis flares a lot, I just did some extra reading on this topic because she is having so many flares.  Tobacco use really makes this flare, just like alcohol does. I know she says she completely avoids alcohol, now we need her to stop tobacco.  We can discuss more at visit.     Dania Munoz PA-C

## 2018-04-06 ENCOUNTER — TELEPHONE (OUTPATIENT)
Dept: CARE COORDINATION | Facility: CLINIC | Age: 38
End: 2018-04-06

## 2018-04-06 ENCOUNTER — PATIENT OUTREACH (OUTPATIENT)
Dept: CARE COORDINATION | Facility: CLINIC | Age: 38
End: 2018-04-06

## 2018-04-06 NOTE — TELEPHONE ENCOUNTER
DC'd from [facility] on 4/5/18 to Home self care   Primary Problem: Acute pancreatitis   LACE: 57 Moderate/High

## 2018-04-06 NOTE — PROGRESS NOTES
Clinic Care Coordination Contact  [facility]/Voicemail    Referral Source: Non-[facility]  Clinical Data: Chart reviewed.  Pt discharged from [facility] ED on 4/5/18.  Care Coordinator Outreach attempted x 1.  Left message on voicemail with call back information and requested return call.  Plan: Care Coordinator will try to reach patient again in 1-2 business days.    Juliane Hare, RN  Care Coordination  [email or web address]  [phone]

## 2018-04-09 ENCOUNTER — PATIENT OUTREACH (OUTPATIENT)
Dept: CARE COORDINATION | Facility: CLINIC | Age: 38
End: 2018-04-09

## 2018-04-09 NOTE — LETTER
Health Care Home - Access Care Plan    About Me  Patient Name:  Zulay Rossi    YOB: 1980  Age:                             38 year old   [place] MRN:            [id number] Telephone Information:     Home Phone [phone]   Mobile [phone]   Home Phone [phone]       Address:    [address] [place] [address] Email address:  [email or web address]      Emergency Contact(s)  Name Relationship Lgl Grd Work Phone Home Phone Mobile Phone   SLAVA OLIVARES Brother    [phone]             Health Maintenance:      My Access Plan  Medical Emergency 911   Questions or concerns during clinic hours Primary Clinic Line, I will call the clinic directly: [facility] - [phone]   24 Hour Appointment Line [phone] or  [phone]) (toll free)   24 Hour Nurse Line [phone] (toll free)   Questions or concerns outside clinic hours 24 Hour Appointment Line, I will call the after-hours on-call line:   [facility] [phone] or [phone]) (toll-free)   Preferred Urgent Care     Preferred Hospital     Preferred Pharmacy [id number] - [place], [place] - [address],  [address]     Behavioral Health Crisis Line The National Suicide Prevention Lifeline at 1-889.736.2045 or 911     My Care Team Members  Patient Care Team       Relationship Specialty Notifications Start End    Dania Munoz PA-C PCP - General Physician Assistant  3/19/15     Phone: [phone] Fax: [phone]         [address] [facility] [address] [facility] [address]    Juliane Hare, RN Clinic Care Coordinator Primary Care - CC  4/6/18     Phone: [phone] Fax: [phone]               My Medical and Care Information  Problem List   Patient Active Problem List   Diagnosis     Mood disorder (H)     Headache     Goiter, non-toxic     Acute gouty arthritis     Elevated uric acid in blood     Ovarian cyst     S/P LEEP of cervix      Tobacco abuse     Acute bronchospasm     Benign essential hypertension     Controlled substance agreement signed     Chronic pain syndrome     Health Care Home     Chronic pancreatitis, unspecified pancreatitis type (H)     Idiopathic chronic pancreatitis (H)     Fatty infiltration of liver      Current Medications and Allergies:  See printed Medication Report

## 2018-04-09 NOTE — PROGRESS NOTES
Clinic Care Coordination Contact  [facility]/Voicemail       Clinical Data: Care Coordinator Outreach for post hosp f/u.  Pt Discharged from [facility] 4/5/18 - Dx Pancreatitis.  Outreach attempted x 2.  Left message on voicemail with call back information and requested return call.  Plan: Care Coordinator will try to reach patient again in 1-3 business days.    Juliane Hare, RN  Care Coordination  [email or web address]  [phone]

## 2018-04-09 NOTE — LETTER
[place] CARE COORDINATION  [address] [address] [address]        April 12, 2018    Zulay Rossi  [address] [address] [address]      Dear Zulay,    I am a clinic care coordinator who works with Dania Munoz PA-C at [facility]. I wanted to introduce myself and provide you with my contact information for you to be able to call me with any questions or concerns. I wanted to introduce myself and provide you with my contact information so that you can call me with questions or concerns about your health care. Below is a description of clinic care coordination and how I can further assist you.     The clinic care coordinator is a registered nurse and/or  who understand the health care system. The goal of clinic care coordination is to help you manage your health and improve access to the [facility] system in the most efficient manner. The registered nurse can assist you in meeting your health care goals by providing education, coordinating services, and strengthening the communication among your providers. The  can assist you with financial, behavioral, psychosocial, chemical dependency, counseling, and/or psychiatric resources.    Please feel free to contact me at [phone], with any questions or concerns. We at [facility] are focused on providing you with the highest-quality healthcare experience possible and that all starts with you.     Sincerely,     Juliane Hare    Enclosed: I have enclosed a copy of a 24 Hour Access Plan. This has helpful phone numbers for you to call when needed. Please keep this in an easy to access place to use as needed.

## 2018-04-10 NOTE — PROGRESS NOTES
"  SUBJECTIVE:                                                    Zulay Rossi is a 38 year old female who presents to clinic today for the following health issues:     f/u pain clinic referral:    Copied from pain clinic note:    \"  - Further procedures recommended:                         Would not recommend at this time                 - Medication Management: Will place recommendations for PCP                                      - Would increase elavil to 25mg at night                        - Continue gabapentin                         - Would consider adding zanaflex 2mg as needed when abdominal pain is severe                         - Discussed trial of antioxidants                        - Continue enzyme replacement                         - For opioids I would recommend using when patient is having a flare instead of on a daily bases. Reasonable to take Percocet 5-325mg  up to three times a day for 3-4 days when having a flair. Would avoid taking for just baseline symptoms. Overall goal to prevent ED visits. Reasonable to provide 12 tabs a months, given having approximately 1 exacerbation a month.     - Physical Therapy: consider in the future   - Urine toxicology screen today:  Today   - Follow up: Will discuss with PCP about plan to proceed.    - Discussed the importance of avoidance of alcohol and smoking         Total time spent was 60 minutes, and more than 50% of face to face time was spent counseling and/or coordination of care regarding principles of multidisciplinary care and medication management abdominal pain radiating to her back.     Yovani Gunn MD  [facility]\"    Patient recently saw pain clinic for their recommendations.  They did not make any adjustments.  I made them today for patient.   Drug urine screen done at pain appt was as expected/passed.        Has appt with pancreatitis specialist through [place] [facility] later this month.  Considering MRI per " patient as she continues to have several pouts of pancreatitis.  She is not avoidinig nicotine, feels smoking is a habit as she has no trouble quitting in the hospital for a week.  She smokes around her boyfriend who also smokes.  Discussed again why she has to completely avoid alcohol and nicotine.      perocet---Has about 20 tabs left, does not need more at this time.   Has controlled contract with me and has been compliant.     Problem list and histories reviewed & adjusted, as indicated.  Additional history: as documented    Patient Active Problem List   Diagnosis     Mood disorder (H)     Headache     Goiter, non-toxic     Acute gouty arthritis     Elevated uric acid in blood     Ovarian cyst     S/P LEEP of cervix     Tobacco abuse     Acute bronchospasm     Benign essential hypertension     Controlled substance agreement signed     Chronic pain syndrome     Health Care Home     Chronic pancreatitis, unspecified pancreatitis type (H)     Idiopathic chronic pancreatitis (H)     Fatty infiltration of liver     Past Surgical History:   Procedure Laterality Date     BIOPSY  7/4/2013    Colonoscopy     COLONOSCOPY       HC VAGINAL DELIVERY, NO EPISIOTOMY/FORCEPS  11/2000     LEEP TX, CERVICAL  2003    MILI III     SOFT TISSUE SURGERY  5/2016    Lacerated tendon repair on right ring finger       Social History   Substance Use Topics     Smoking status: Current Every Day Smoker     Packs/day: 0.50     Years: 15.00     Types: Cigarettes     Smokeless tobacco: Never Used     Alcohol use Yes      Comment: occ     Family History   Problem Relation Age of Onset     Unknown/Adopted Mother          Current Outpatient Prescriptions   Medication Sig Dispense Refill     amitriptyline (ELAVIL) 25 MG tablet Take 1 tablet (25 mg) by mouth At Bedtime 30 tablet 3     tiZANidine (ZANAFLEX) 2 MG tablet Take 1 tablet (2 mg) by mouth 3 times daily as needed for muscle spasms /pain 60 tablet 0     buPROPion (WELLBUTRIN SR) 150 MG 12 hr  tablet Take 1 tablet (150 mg) by mouth 2 times daily 60 tablet 1     oxyCODONE-acetaminophen (PERCOCET) 5-325 MG per tablet Take 1 tablet by mouth every 8 hours as needed for pain Maximum 4 tablet(s) per day 12 tablet 0     montelukast (SINGULAIR) 10 MG tablet TAKE 1 TABLET (10 MG) BY MOUTH AT BEDTIME FOR COUGH/ALLERGIES 30 tablet 3     CREON 26989-60223 UNITS CPEP per EC capsule TAKE 1 CAPSULE (24,000 UNITS) BY MOUTH 4 TIMES DAILY AS NEEDED TAKE WITH MEALS AND ONE SNACK 120 capsule 1     ondansetron (ZOFRAN) 8 MG tablet TAKE 1 TABLET (8 MG) BY MOUTH EVERY 8 HOURS AS NEEDED FOR NAUSEA 18 tablet 3     metoclopramide (REGLAN) 10 MG tablet Take 10 mg by mouth       metoclopramide (REGLAN) 10 MG tablet Take 1 tablet (10 mg) by mouth 2 times daily as needed 20 tablet 0     gabapentin (NEURONTIN) 300 MG capsule Take 2 capsules (600 mg) by mouth 3 times daily 180 capsule 0     albuterol (PROAIR HFA) 108 (90 BASE) MCG/ACT Inhaler Inhale 1-2 puffs into the lungs every 4 hours as needed for shortness of breath / dyspnea or wheezing 18 Inhaler 4     atorvastatin (LIPITOR) 10 MG tablet Take 10 mg by mouth daily       losartan (COZAAR) 50 MG tablet TAKE 1 TABLET (50 MG) BY MOUTH DAILY 90 tablet 3     amLODIPine (NORVASC) 10 MG tablet TAKE 1 TABLET (10 MG) BY MOUTH DAILY 90 tablet 3     fenofibrate micronized (LOFIBRA) 200 MG capsule Take 1 capsule (200 mg) by mouth every morning (before breakfast) 90 capsule 1     sertraline (ZOLOFT) 100 MG tablet Take 1 tablet (100 mg) by mouth daily 90 tablet 3     albuterol (2.5 MG/3ML) 0.083% nebulizer solution Take 1 vial (2.5 mg) by nebulization every 4 hours as needed for shortness of breath / dyspnea or wheezing 60 vial 5     ranitidine (ZANTAC) 150 MG tablet Take  by mouth daily.       [DISCONTINUED] amitriptyline (ELAVIL) 10 MG tablet Take 1 tablet (10 mg) by mouth At Bedtime 90 tablet 1     No Known Allergies    ROS:  Constitutional, HEENT, cardiovascular, pulmonary, GI, ,  musculoskeletal, neuro, skin, endocrine and psych systems are negative, except as otherwise noted.    OBJECTIVE:     /79  Pulse 99  Temp 98.3  F (36.8  C) (Oral)  Resp 16  Wt 141 lb (64 kg)  SpO2 99%  Breastfeeding? No  BMI 25.79 kg/m2  Body mass index is 25.79 kg/(m^2).  GENERAL: healthy, alert and no distress  RESP: lungs clear to auscultation - no rales, rhonchi or wheezes  CV: regular rate and rhythm, normal S1 S2, no S3 or S4, no murmur, click or rub, no peripheral edema and peripheral pulses strong  MS: no gross musculoskeletal defects noted, no edema  PSYCH: mentation appears normal, affect normal/bright        ASSESSMENT/PLAN:     ASSESSMENT / PLAN:  (G89.4) Chronic pain syndrome  (primary encounter diagnosis)  Comment:   Plan: amitriptyline (ELAVIL) 25 MG tablet, tiZANidine        (ZANAFLEX) 2 MG tablet          Made pain clinic's recommendation changes myself  Patient will recheck with me in 3 months, see below  Continue to f/u with gastroenterology    Never mix flexeril with zanaflex  Do not drive after taking as it might make you sleepy  Patient is avoiding alcohol anyway but told to never mix with alcohol     (K86.1) Chronic pancreatitis, unspecified pancreatitis type (H)  Comment:   Plan: amitriptyline (ELAVIL) 25 MG tablet, tiZANidine        (ZANAFLEX) 2 MG tablet, Lipid panel reflex to         direct LDL Fasting            (Z72.0) Tobacco abuse  Comment:   Plan: buPROPion (WELLBUTRIN SR) 150 MG 12 hr tablet        Discussed all options and decided to try wellbutrin, will take for a week before trying to quit  She will consider CBT for this also and talk to her BF about him quitting or at least not smoking around her      Recheck TGs in next 3 months    Patient Instructions   Recheck 3 months, sooner if need  If you need more pain medications beforehand let me know I can do this over the phone      Billin min spent face-to-face with patient. 15 min on history, 1 on exam, 9 on  discussing  treatment plan and smoking cessation. '    Dania Munoz PA-C  [facility]

## 2018-04-12 ENCOUNTER — OFFICE VISIT (OUTPATIENT)
Dept: FAMILY MEDICINE | Facility: CLINIC | Age: 38
End: 2018-04-12
Payer: COMMERCIAL

## 2018-04-12 ENCOUNTER — TELEPHONE (OUTPATIENT)
Dept: PALLIATIVE MEDICINE | Facility: CLINIC | Age: 38
End: 2018-04-12

## 2018-04-12 VITALS
WEIGHT: 141 LBS | RESPIRATION RATE: 16 BRPM | BODY MASS INDEX: 25.79 KG/M2 | DIASTOLIC BLOOD PRESSURE: 79 MMHG | SYSTOLIC BLOOD PRESSURE: 124 MMHG | TEMPERATURE: 98.3 F | OXYGEN SATURATION: 99 % | HEART RATE: 99 BPM

## 2018-04-12 DIAGNOSIS — K86.1 CHRONIC PANCREATITIS, UNSPECIFIED PANCREATITIS TYPE (H): ICD-10-CM

## 2018-04-12 DIAGNOSIS — G89.4 CHRONIC PAIN SYNDROME: Primary | ICD-10-CM

## 2018-04-12 DIAGNOSIS — Z72.0 TOBACCO ABUSE: ICD-10-CM

## 2018-04-12 PROCEDURE — 99214 OFFICE O/P EST MOD 30 MIN: CPT | Performed by: PHYSICIAN ASSISTANT

## 2018-04-12 RX ORDER — TIZANIDINE 2 MG/1
2 TABLET ORAL 3 TIMES DAILY PRN
Qty: 60 TABLET | Refills: 0 | Status: SHIPPED | OUTPATIENT
Start: 2018-04-12 | End: 2018-09-21

## 2018-04-12 RX ORDER — BUPROPION HYDROCHLORIDE 150 MG/1
150 TABLET, EXTENDED RELEASE ORAL 2 TIMES DAILY
Qty: 60 TABLET | Refills: 1 | Status: SHIPPED | OUTPATIENT
Start: 2018-04-12 | End: 2018-09-21

## 2018-04-12 ASSESSMENT — ANXIETY QUESTIONNAIRES
1. FEELING NERVOUS, ANXIOUS, OR ON EDGE: NOT AT ALL
2. NOT BEING ABLE TO STOP OR CONTROL WORRYING: NOT AT ALL
3. WORRYING TOO MUCH ABOUT DIFFERENT THINGS: NOT AT ALL
5. BEING SO RESTLESS THAT IT IS HARD TO SIT STILL: NOT AT ALL
6. BECOMING EASILY ANNOYED OR IRRITABLE: NOT AT ALL
GAD7 TOTAL SCORE: 0
7. FEELING AFRAID AS IF SOMETHING AWFUL MIGHT HAPPEN: NOT AT ALL
IF YOU CHECKED OFF ANY PROBLEMS ON THIS QUESTIONNAIRE, HOW DIFFICULT HAVE THESE PROBLEMS MADE IT FOR YOU TO DO YOUR WORK, TAKE CARE OF THINGS AT HOME, OR GET ALONG WITH OTHER PEOPLE: NOT DIFFICULT AT ALL

## 2018-04-12 ASSESSMENT — PATIENT HEALTH QUESTIONNAIRE - PHQ9: 5. POOR APPETITE OR OVEREATING: NOT AT ALL

## 2018-04-12 NOTE — NURSING NOTE
"Chief Complaint   Patient presents with     RECHECK     F/U after meeting with pain clinic     Health Maintenance     DAP and MAURA       Initial /79  Pulse 99  Temp 98.3  F (36.8  C) (Oral)  Resp 16  Wt 141 lb (64 kg)  SpO2 99%  Breastfeeding? No  BMI 25.79 kg/m2 Estimated body mass index is 25.79 kg/(m^2) as calculated from the following:    Height as of 3/30/18: 5' 2\" (1.575 m).    Weight as of this encounter: 141 lb (64 kg).  Medication Reconciliation: complete      Carlos Manuel Horowitz MA    "

## 2018-04-12 NOTE — MR AVS SNAPSHOT
After Visit Summary   4/12/2018    Zulay Rossi    MRN: [id number]           Patient Information     Date Of Birth          1980        Visit Information        Provider Department      4/12/2018 12:40 PM Dania Munoz PA-C [facility]        Today's Diagnoses     Chronic pain syndrome    -  1    Chronic pancreatitis, unspecified pancreatitis type (H)        Tobacco abuse          Care Instructions    Recheck 3 months, sooner if need  If you need more pain medications beforehand let me know I can do this over the phone            Follow-ups after your visit        Future tests that were ordered for you today     Open Future Orders        Priority Expected Expires Ordered    Lipid panel reflex to direct LDL Fasting Routine  4/12/2019 4/12/2018            Who to contact     If you have questions or need follow up information about today's clinic visit or your schedule please contact [facility] directly at [phone].  Normal or non-critical lab and imaging results will be communicated to you by MyChart, letter or phone within 4 business days after the clinic has received the results. If you do not hear from us within 7 days, please contact the clinic through [employer] or phone. If you have a critical or abnormal lab result, we will notify you by phone as soon as possible.  Submit refill requests through [employer] or call your pharmacy and they will forward the refill request to us. Please allow 3 business days for your refill to be completed.          Additional Information About Your Visit        MyChart Information     [employer] gives you secure access to your electronic health record. If you see a primary care provider, you can also send messages to your care team and make appointments. If you have questions, please call your primary care clinic.  If you do not have a primary care provider, please call [phone] and they will assist you.        Care  EveryWhere ID     This is your Care EveryWhere ID. This could be used by other organizations to access your [facility] medical records  [id number]        Your Vitals Were     Pulse Temperature Respirations Pulse Oximetry Breastfeeding? BMI (Body Mass Index)    99 98.3  F (36.8  C) (Oral) 16 99% No 25.79 kg/m2       Blood Pressure from Last 3 Encounters:   04/12/18 124/79   03/30/18 (!) 152/98   02/05/18 (!) 140/92    Weight from Last 3 Encounters:   04/12/18 141 lb (64 kg)   03/30/18 143 lb (64.9 kg)   02/05/18 139 lb (63 kg)                 Today's Medication Changes          These changes are accurate as of 4/12/18  1:20 PM.  If you have any questions, ask your nurse or doctor.               Start taking these medicines.        Dose/Directions    buPROPion 150 MG 12 hr tablet   Commonly known as:  WELLBUTRIN SR   Used for:  Tobacco abuse   Started by:  Dania Munoz PA-C        Dose:  150 mg   Take 1 tablet (150 mg) by mouth 2 times daily   Quantity:  60 tablet   Refills:  1       tiZANidine 2 MG tablet   Commonly known as:  ZANAFLEX   Used for:  Chronic pain syndrome, Chronic pancreatitis, unspecified pancreatitis type (H)   Started by:  Dania Munoz PA-C        Dose:  2 mg   Take 1 tablet (2 mg) by mouth 3 times daily as needed for muscle spasms /pain   Quantity:  60 tablet   Refills:  0         These medicines have changed or have updated prescriptions.        Dose/Directions    amitriptyline 25 MG tablet   Commonly known as:  ELAVIL   This may have changed:    - medication strength  - how much to take   Used for:  Chronic pain syndrome, Chronic pancreatitis, unspecified pancreatitis type (H)   Changed by:  Dania Munoz PA-C        Dose:  25 mg   Take 1 tablet (25 mg) by mouth At Bedtime   Quantity:  30 tablet   Refills:  3         Stop taking these medicines if you haven't already. Please contact your care team if you have questions.     cyclobenzaprine 5 MG tablet    Commonly known as:  FLEXERIL   Stopped by:  Dania Munoz PA-C           nicotine 14 MG/24HR 24 hr patch   Commonly known as:  NICODERM CQ   Stopped by:  Dania Munoz PA-C           nicotine 7 MG/24HR 24 hr patch   Commonly known as:  NICODERM CQ   Stopped by:  Dania Munoz PA-C                Where to get your medicines      These medications were sent to [facility]/pharmacy #[id number] - [place], [place] - [address],  AT CORNER OF [facility]  [address], , [facility] [address]     Phone:  [phone]     amitriptyline 25 MG tablet    buPROPion 150 MG 12 hr tablet    tiZANidine 2 MG tablet                Primary Care Provider Office Phone # Fax #    Dania Munoz PA-C [phone] [address] [address]        Equal Access to Services     [facility] AH: Hadii aad ku hadasho Soomaali, waaxda luqadaha, qaybta kaalmada adeegyada, waxay idiin hayaan taco irwin . So [facility] [phone].    ATENCIÓN: Si habla español, tiene a delgado disposición servicios gratuitos de asistencia lingüística. Llame al [phone].    We comply with applicable federal civil rights laws and [place] laws. We do not discriminate on the basis of race, color, national origin, age, disability, sex, sexual orientation, or gender identity.            Thank you!     Thank you for choosing [facility]  for your care. Our goal is always to provide you with excellent care. Hearing back from our patients is one way we can continue to improve our services. Please take a few minutes to complete the written survey that you may receive in the mail after your visit with us. Thank you!             Your Updated Medication List - Protect others around you: Learn how to safely use, store and throw away your medicines at www.disposemymeds.org.          This list is accurate as of 4/12/18  1:20 PM.  Always use your most recent med list.                    Brand Name Dispense Instructions for use Diagnosis    * albuterol (2.5 MG/3ML) 0.083% neb solution     60 vial    Take 1 vial (2.5 mg) by nebulization every 4 hours as needed for shortness of breath / dyspnea or wheezing    Acute bronchitis with symptoms > 10 days       * albuterol 108 (90 Base) MCG/ACT Inhaler    PROAIR HFA    18 Inhaler    Inhale 1-2 puffs into the lungs every 4 hours as needed for shortness of breath / dyspnea or wheezing    Acute bronchospasm       amitriptyline 25 MG tablet    ELAVIL    30 tablet    Take 1 tablet (25 mg) by mouth At Bedtime    Chronic pain syndrome, Chronic pancreatitis, unspecified pancreatitis type (H)       amLODIPine 10 MG tablet    NORVASC    90 tablet    TAKE 1 TABLET (10 MG) BY MOUTH DAILY    Benign essential hypertension       buPROPion 150 MG 12 hr tablet    WELLBUTRIN SR    60 tablet    Take 1 tablet (150 mg) by mouth 2 times daily    Tobacco abuse       CREON 94836-21153 units Cpep per EC capsule   Generic drug:  amylase-lipase-protease     120 capsule    TAKE 1 CAPSULE (24,000 UNITS) BY MOUTH 4 TIMES DAILY AS NEEDED TAKE WITH MEALS AND ONE SNACK    Idiopathic chronic pancreatitis (H)       fenofibrate micronized 200 MG capsule    LOFIBRA    90 capsule    Take 1 capsule (200 mg) by mouth every morning (before breakfast)    Acute pancreatitis, unspecified complication status, unspecified pancreatitis type       gabapentin 300 MG capsule    NEURONTIN    180 capsule    Take 2 capsules (600 mg) by mouth 3 times daily    Idiopathic chronic pancreatitis (H)       LIPITOR 10 MG tablet   Generic drug:  atorvastatin      Take 10 mg by mouth daily        losartan 50 MG tablet    COZAAR    90 tablet    TAKE 1 TABLET (50 MG) BY MOUTH DAILY    Benign essential hypertension       * metoclopramide 10 MG tablet    REGLAN     Take 10 mg by mouth        * metoclopramide 10 MG tablet    REGLAN    20 tablet    Take 1 tablet (10 mg) by mouth 2 times daily as needed     Upper abdominal pain       montelukast 10 MG tablet    SINGULAIR    30 tablet    TAKE 1 TABLET (10 MG) BY MOUTH AT BEDTIME FOR COUGH/ALLERGIES    Cough, Multiple allergies       ondansetron 8 MG tablet    ZOFRAN    18 tablet    TAKE 1 TABLET (8 MG) BY MOUTH EVERY 8 HOURS AS NEEDED FOR NAUSEA    Nausea       oxyCODONE-acetaminophen 5-325 MG per tablet    PERCOCET    12 tablet    Take 1 tablet by mouth every 8 hours as needed for pain Maximum 4 tablet(s) per day    Chronic pancreatitis, unspecified pancreatitis type (H)       sertraline 100 MG tablet    ZOLOFT    90 tablet    Take 1 tablet (100 mg) by mouth daily    Episode of recurrent major depressive disorder, unspecified depression episode severity (H)       tiZANidine 2 MG tablet    ZANAFLEX    60 tablet    Take 1 tablet (2 mg) by mouth 3 times daily as needed for muscle spasms /pain    Chronic pain syndrome, Chronic pancreatitis, unspecified pancreatitis type (H)       ZANTAC 150 MG tablet   Generic drug:  ranitidine      Take  by mouth daily.        * Notice:  This list has 4 medication(s) that are the same as other medications prescribed for you. Read the directions carefully, and ask your doctor or other care provider to review them with you.

## 2018-04-12 NOTE — PATIENT INSTRUCTIONS
Recheck 3 months, sooner if need  If you need more pain medications beforehand let me know I can do this over the phone

## 2018-04-12 NOTE — TELEPHONE ENCOUNTER
Contact Attempt      Outreach attempted x 1.  Left message on voicemail with call back information and requested return call.    Plan:  Will await for CB.     Fernanda Zurita RN, [facility]  Pain Clinic Care Coordinator

## 2018-04-12 NOTE — PROGRESS NOTES
Clinic Care Coordination Contact  [facility]/Voicemail       Clinical Data: Care Coordinator Outreach  Outreach attempted x 3.  Left message on voicemail with call back information and requested return call.  Plan: Care Coordinator will mail out care coordination introduction letter with care coordinator contact information and explanation of care coordination services. Care Coordinator will do no further outreaches at this time.      Juliane Hare RN  Care Coordination  [email or web address]  [phone]

## 2018-04-12 NOTE — LETTER
My Depression Action Plan  Name: Zulay Rossi   Date of Birth 1980  Date: 4/10/2018    My doctor: Dania Munoz   My clinic: [facility]  [id number] [place] [id number] [phone]          GREEN    ZONE   Good Control    What it looks like:     Things are going generally well. You have normal up s and down s. You may even feel depressed from time to time, but bad moods usually last less than a day.   What you need to do:  1. Continue to care for yourself (see self care plan)  2. Check your depression survival kit and update it as needed  3. Follow your physician s recommendations including any medication.  4. Do not stop taking medication unless you consult with your physician first.           YELLOW         ZONE Getting Worse    What it looks like:     Depression is starting to interfere with your life.     It may be hard to get out of bed; you may be starting to isolate yourself from others.    Symptoms of depression are starting to last most all day and this has happened for several days.     You may have suicidal thoughts but they are not constant.   What you need to do:     1. Call your care team, your response to treatment will improve if you keep your care team informed of your progress. Yellow periods are signs an adjustment may need to be made.     2. Continue your self-care, even if you have to fake it!    3. Talk to someone in your support network    4. Open up your depression survival kit           RED    ZONE Medical Alert - Get Help    What it looks like:     Depression is seriously interfering with your life.     You may experience these or other symptoms: You can t get out of bed most days, can t work or engage in other necessary activities, you have trouble taking care of basic hygiene, or basic responsibilities, thoughts of suicide or death that will not go away, self-injurious behavior.     What you need to do:  1. Call your care team and  request a same-day appointment. If they are not available (weekends or after hours) call your local crisis line, emergency room or 911.            Depression Self Care Plan / Survival Kit    Self-Care for Depression  Here s the deal. Your body and mind are really not as separate as most people think.  What you do and think affects how you feel and how you feel influences what you do and think. This means if you do things that people who feel good do, it will help you feel better.  Sometimes this is all it takes.  There is also a place for medication and therapy depending on how severe your depression is, so be sure to consult with your medical provider and/ or Behavioral Health Consultant if your symptoms are worsening or not improving.     In order to better manage my stress, I will:    Exercise  Get some form of exercise, every day. This will help reduce pain and release endorphins, the  feel good  chemicals in your brain. This is almost as good as taking antidepressants!  This is not the same as joining a gym and then never going! (they count on that by the way ) It can be as simple as just going for a walk or doing some gardening, anything that will get you moving.      Hygiene   Maintain good hygiene (Get out of bed in the morning, Make your bed, Brush your teeth, Take a shower, and Get dressed like you were going to work, even if you are unemployed).  If your clothes don't fit try to get ones that do.    Diet  I will strive to eat foods that are good for me, drink plenty of water, and avoid excessive sugar, caffeine, alcohol, and other mood-altering substances.  Some foods that are helpful in depression are: complex carbohydrates, B vitamins, flaxseed, fish or fish oil, fresh fruits and vegetables.    Psychotherapy  I agree to participate in Individual Therapy (if recommended).    Medication  If prescribed medications, I agree to take them.  Missing doses can result in serious side effects.  I understand that  drinking alcohol, or other illicit drug use, may cause potential side effects.  I will not stop my medication abruptly without first discussing it with my provider.    Staying Connected With Others  I will stay in touch with my friends, family members, and my primary care provider/team.    Use your imagination  Be creative.  We all have a creative side; it doesn t matter if it s oil painting, sand castles, or mud pies! This will also kick up the endorphins.    Witness Beauty  (AKA stop and smell the roses) Take a look outside, even in mid-winter. Notice colors, textures. Watch the squirrels and birds.     Service to others  Be of service to others.  There is always someone else in need.  By helping others we can  get out of ourselves  and remember the really important things.  This also provides opportunities for practicing all the other parts of the program.    Humor  Laugh and be silly!  Adjust your TV habits for less news and crime-drama and more comedy.    Control your stress  Try breathing deep, massage therapy, biofeedback, and meditation. Find time to relax each day.     My support system    Clinic Contact:  Phone number:    Contact 1:  Phone number:    Contact 2:  Phone number:    Anabaptism/:  Phone number:    Therapist:  Phone number:    Local crisis center:    Phone number:    Other community support:  Phone number:

## 2018-04-13 ASSESSMENT — ANXIETY QUESTIONNAIRES: GAD7 TOTAL SCORE: 0

## 2018-04-13 ASSESSMENT — PATIENT HEALTH QUESTIONNAIRE - PHQ9: SUM OF ALL RESPONSES TO PHQ QUESTIONS 1-9: 0

## 2018-04-13 NOTE — TELEPHONE ENCOUNTER
Patient returning phone call.   Pt states you can leave detailed message of lab results on her VM at [phone]      Erin RAYMOND    [facility]

## 2018-04-13 NOTE — TELEPHONE ENCOUNTER
Called patient and left VM about UDS results being as expected. Provided triage number if patient had any questions.     Samantha STEELEN-RN Care Coordinator  [facility]

## 2018-04-19 ENCOUNTER — TRANSFERRED RECORDS (OUTPATIENT)
Dept: HEALTH INFORMATION MANAGEMENT | Facility: CLINIC | Age: 38
End: 2018-04-19

## 2018-05-06 DIAGNOSIS — K86.1 IDIOPATHIC CHRONIC PANCREATITIS (H): ICD-10-CM

## 2018-05-08 RX ORDER — AMITRIPTYLINE HYDROCHLORIDE 10 MG/1
TABLET ORAL
Qty: 90 TABLET | Refills: 1 | OUTPATIENT
Start: 2018-05-08

## 2018-05-15 DIAGNOSIS — G89.4 CHRONIC PAIN SYNDROME: Primary | ICD-10-CM

## 2018-05-15 RX ORDER — AMITRIPTYLINE HYDROCHLORIDE 10 MG/1
TABLET ORAL
Refills: 1 | COMMUNITY
Start: 2018-01-28 | End: 2018-05-15

## 2018-05-16 RX ORDER — AMITRIPTYLINE HYDROCHLORIDE 10 MG/1
TABLET ORAL
Qty: 90 TABLET | Refills: 0 | Status: SHIPPED | OUTPATIENT
Start: 2018-05-16 | End: 2018-09-05

## 2018-05-30 ENCOUNTER — MYC REFILL (OUTPATIENT)
Dept: FAMILY MEDICINE | Facility: CLINIC | Age: 38
End: 2018-05-30

## 2018-05-30 DIAGNOSIS — K86.1 CHRONIC PANCREATITIS, UNSPECIFIED PANCREATITIS TYPE (H): ICD-10-CM

## 2018-05-30 NOTE — TELEPHONE ENCOUNTER
Controlled Substance Refill Request for percocet  Problem List Complete:  No       PROVIDER TO CONSIDER COMPLETION OF PROBLEM LIST AND OVERVIEW/CONTROLLED SUBSTANCE AGREEMENT      Last Written Prescription Date:  4/5/18  Last Fill Quantity: 12,   # refills: 0      Last Office Visit with [facility] primary care provider: 1/11/18      Future Office visit:       Controlled substance agreement on file: Yes:  Date 4/12/18.      Processing:  Patient will  in clinic       checked in past 6 months?  Yes 5/30/18  Barbara Overton RN

## 2018-05-30 NOTE — TELEPHONE ENCOUNTER
Message from [employer]:  Original authorizing provider: KIMBERLY Sanders would like a refill of the following medications:  oxyCODONE-acetaminophen (PERCOCET) 5-325 MG per tablet [Dania Munoz PA-C]    Preferred pharmacy: WRITTEN PRESCRIPTION REQUESTED    Comment:

## 2018-05-31 RX ORDER — OXYCODONE AND ACETAMINOPHEN 5; 325 MG/1; MG/1
1 TABLET ORAL EVERY 8 HOURS PRN
Qty: 12 TABLET | Refills: 0 | Status: SHIPPED | OUTPATIENT
Start: 2018-05-31 | End: 2018-09-21

## 2018-06-03 DIAGNOSIS — K86.1 IDIOPATHIC CHRONIC PANCREATITIS (H): ICD-10-CM

## 2018-06-06 RX ORDER — PANCRELIPASE 24000; 76000; 120000 [USP'U]/1; [USP'U]/1; [USP'U]/1
CAPSULE, DELAYED RELEASE PELLETS ORAL
Qty: 120 CAPSULE | Refills: 1 | Status: SHIPPED | OUTPATIENT
Start: 2018-06-06 | End: 2021-06-07

## 2018-06-06 NOTE — TELEPHONE ENCOUNTER
Routing refill request to provider for review/approval because:  Drug not on the [facility] refill protocol   Barbara Overton BSN, RN

## 2018-07-13 ENCOUNTER — TRANSFERRED RECORDS (OUTPATIENT)
Dept: HEALTH INFORMATION MANAGEMENT | Facility: CLINIC | Age: 38
End: 2018-07-13

## 2018-07-23 ENCOUNTER — TRANSFERRED RECORDS (OUTPATIENT)
Dept: HEALTH INFORMATION MANAGEMENT | Facility: CLINIC | Age: 38
End: 2018-07-23

## 2018-08-07 DIAGNOSIS — K86.1 IDIOPATHIC CHRONIC PANCREATITIS (H): ICD-10-CM

## 2018-08-09 RX ORDER — AMITRIPTYLINE HYDROCHLORIDE 10 MG/1
TABLET ORAL
Qty: 90 TABLET | Refills: 2 | Status: SHIPPED | OUTPATIENT
Start: 2018-08-09 | End: 2018-09-05

## 2018-08-27 ENCOUNTER — OFFICE VISIT (OUTPATIENT)
Dept: URGENT CARE | Facility: URGENT CARE | Age: 38
End: 2018-08-27
Payer: COMMERCIAL

## 2018-08-27 VITALS
DIASTOLIC BLOOD PRESSURE: 110 MMHG | WEIGHT: 138 LBS | TEMPERATURE: 97.1 F | SYSTOLIC BLOOD PRESSURE: 179 MMHG | BODY MASS INDEX: 25.24 KG/M2 | OXYGEN SATURATION: 97 % | HEART RATE: 116 BPM

## 2018-08-27 DIAGNOSIS — M10.071 ACUTE IDIOPATHIC GOUT OF RIGHT FOOT: Primary | ICD-10-CM

## 2018-08-27 PROCEDURE — 99213 OFFICE O/P EST LOW 20 MIN: CPT | Performed by: NURSE PRACTITIONER

## 2018-08-27 RX ORDER — PREDNISONE 20 MG/1
TABLET ORAL
Qty: 20 TABLET | Refills: 0 | Status: SHIPPED | OUTPATIENT
Start: 2018-08-27 | End: 2018-09-21

## 2018-08-27 ASSESSMENT — PAIN SCALES - GENERAL: PAINLEVEL: SEVERE PAIN (7)

## 2018-08-27 NOTE — MR AVS SNAPSHOT
After Visit Summary   8/27/2018    Zulay Rossi    MRN: [id number]           Patient Information     Date Of Birth          1980        Visit Information        Provider Department      8/27/2018 6:50 PM Isabella Lundberg APRN CNP [facility]        Today's Diagnoses     Acute idiopathic gout of right foot    -  1       Follow-ups after your visit        Who to contact     If you have questions or need follow up information about today's clinic visit or your schedule please contact [facility] directly at [phone].  Normal or non-critical lab and imaging results will be communicated to you by [employer], letter or phone within 4 business days after the clinic has received the results. If you do not hear from us within 7 days, please contact the clinic through [employer] or phone. If you have a critical or abnormal lab result, we will notify you by phone as soon as possible.  Submit refill requests through [employer] or call your pharmacy and they will forward the refill request to us. Please allow 3 business days for your refill to be completed.          Additional Information About Your Visit        MyChart Information     [employer] gives you secure access to your electronic health record. If you see a primary care provider, you can also send messages to your care team and make appointments. If you have questions, please call your primary care clinic.  If you do not have a primary care provider, please call [phone] and they will assist you.        Care EveryWhere ID     This is your Care EveryWhere ID. This could be used by other organizations to access your [facility] medical records  [id number]        Your Vitals Were     Pulse Temperature Pulse Oximetry BMI (Body Mass Index)          116 97.1  F (36.2  C) (Oral) 97% 25.24 kg/m2         Blood Pressure from Last 3 Encounters:   08/27/18 (!) 179/110   04/12/18 124/79   03/30/18 (!) 152/98    Weight from Last 3  Encounters:   08/27/18 138 lb (62.6 kg)   04/12/18 141 lb (64 kg)   03/30/18 143 lb (64.9 kg)              Today, you had the following     No orders found for display         Today's Medication Changes          These changes are accurate as of 8/27/18  7:36 PM.  If you have any questions, ask your nurse or doctor.               Start taking these medicines.        Dose/Directions    predniSONE 20 MG tablet   Commonly known as:  DELTASONE   Used for:  Acute idiopathic gout of right foot   Started by:  Isabella Lundberg APRN CNP        Take 3 tabs (60 mg) by mouth daily x 3 days, 2 tabs (40 mg) daily x 3 days, 1 tab (20 mg) daily x 3 days, then 1/2 tab (10 mg) x 3 days.   Quantity:  20 tablet   Refills:  0            Where to get your medicines      These medications were sent to [facility]/pharmacy #[id number] - [address],  AT [address] OF [address], [facility] [address]     Phone:  [phone]     predniSONE 20 MG tablet                Primary Care Provider Office Phone # Fax #    Dania Munoz PA-C [phone] [address] [facility] [address]        Equal Access to Services     SAUD LR AH: Hadii christopher mason hadasho Soomaali, waaxda luqadaha, qaybta kaalmada adeegyada, lori valera haymikayla mcadams. So [facility] [phone].    ATENCIÓN: Si habla español, tiene a delgado disposición servicios gratuitos de asistencia lingüística. Garrett al [phone].    We comply with applicable federal civil rights laws and [place] laws. We do not discriminate on the basis of race, color, national origin, age, disability, sex, sexual orientation, or gender identity.            Thank you!     Thank you for choosing [facility]  for your care. Our goal is always to provide you with excellent care. Hearing back from our patients is one way we can continue to improve our services. Please take a few minutes to complete the written  survey that you may receive in the mail after your visit with us. Thank you!             Your Updated Medication List - Protect others around you: Learn how to safely use, store and throw away your medicines at www.disposemymeds.org.          This list is accurate as of 8/27/18  7:36 PM.  Always use your most recent med list.                   Brand Name Dispense Instructions for use Diagnosis    * albuterol (2.5 MG/3ML) 0.083% neb solution     60 vial    Take 1 vial (2.5 mg) by nebulization every 4 hours as needed for shortness of breath / dyspnea or wheezing    Acute bronchitis with symptoms > 10 days       * albuterol 108 (90 Base) MCG/ACT inhaler    PROAIR HFA    18 Inhaler    Inhale 1-2 puffs into the lungs every 4 hours as needed for shortness of breath / dyspnea or wheezing    Acute bronchospasm       * amitriptyline 25 MG tablet    ELAVIL    30 tablet    Take 1 tablet (25 mg) by mouth At Bedtime    Chronic pain syndrome, Chronic pancreatitis, unspecified pancreatitis type (H)       * amitriptyline 10 MG tablet    ELAVIL    90 tablet    TAKE 1 TABLET (10 MG) BY MOUTH AT BEDTIME    Chronic pain syndrome       * amitriptyline 10 MG tablet    ELAVIL    90 tablet    TAKE 1 TABLET (10 MG) BY MOUTH AT BEDTIME    Idiopathic chronic pancreatitis (H)       amLODIPine 10 MG tablet    NORVASC    90 tablet    TAKE 1 TABLET (10 MG) BY MOUTH DAILY    Benign essential hypertension       buPROPion 150 MG 12 hr tablet    WELLBUTRIN SR    60 tablet    Take 1 tablet (150 mg) by mouth 2 times daily    Tobacco abuse       CREON 74150-57721 units Cpep per EC capsule   Generic drug:  amylase-lipase-protease     120 capsule    TAKE 1 CAPSULE (24,000 UNITS) BY MOUTH 4 TIMES DAILY AS NEEDED TAKE WITH MEALS AND ONE SNACK    Idiopathic chronic pancreatitis (H)       fenofibrate micronized 200 MG capsule    LOFIBRA    90 capsule    Take 1 capsule (200 mg) by mouth every morning (before breakfast)    Acute pancreatitis, unspecified  complication status, unspecified pancreatitis type       gabapentin 300 MG capsule    NEURONTIN    180 capsule    Take 2 capsules (600 mg) by mouth 3 times daily    Idiopathic chronic pancreatitis (H)       LIPITOR 10 MG tablet   Generic drug:  atorvastatin      Take 10 mg by mouth daily        losartan 50 MG tablet    COZAAR    90 tablet    TAKE 1 TABLET (50 MG) BY MOUTH DAILY    Benign essential hypertension       * metoclopramide 10 MG tablet    REGLAN     Take 10 mg by mouth        * metoclopramide 10 MG tablet    REGLAN    20 tablet    Take 1 tablet (10 mg) by mouth 2 times daily as needed    Upper abdominal pain       montelukast 10 MG tablet    SINGULAIR    30 tablet    TAKE 1 TABLET (10 MG) BY MOUTH AT BEDTIME FOR COUGH/ALLERGIES    Cough, Multiple allergies       ondansetron 8 MG tablet    ZOFRAN    18 tablet    TAKE 1 TABLET (8 MG) BY MOUTH EVERY 8 HOURS AS NEEDED FOR NAUSEA    Nausea       oxyCODONE-acetaminophen 5-325 MG per tablet    PERCOCET    12 tablet    Take 1 tablet by mouth every 8 hours as needed for pain Maximum 4 tablet(s) per day    Chronic pancreatitis, unspecified pancreatitis type (H)       predniSONE 20 MG tablet    DELTASONE    20 tablet    Take 3 tabs (60 mg) by mouth daily x 3 days, 2 tabs (40 mg) daily x 3 days, 1 tab (20 mg) daily x 3 days, then 1/2 tab (10 mg) x 3 days.    Acute idiopathic gout of right foot       sertraline 100 MG tablet    ZOLOFT    90 tablet    Take 1 tablet (100 mg) by mouth daily    Episode of recurrent major depressive disorder, unspecified depression episode severity (H)       tiZANidine 2 MG tablet    ZANAFLEX    60 tablet    Take 1 tablet (2 mg) by mouth 3 times daily as needed for muscle spasms /pain    Chronic pain syndrome, Chronic pancreatitis, unspecified pancreatitis type (H)       ZANTAC 150 MG tablet   Generic drug:  ranitidine      Take  by mouth daily.        * Notice:  This list has 7 medication(s) that are the same as other medications  prescribed for you. Read the directions carefully, and ask your doctor or other care provider to review them with you.
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
## 2018-08-28 NOTE — NURSING NOTE
"Chief Complaint   Patient presents with     Musculoskeletal Problem     C/o gout flare up right foot. Been coming on for the last couple weeks, with the last few days being really bad.       Initial BP (!) 179/110  Pulse 116  Temp 97.1  F (36.2  C) (Oral)  Wt 138 lb (62.6 kg)  SpO2 97%  BMI 25.24 kg/m2 Estimated body mass index is 25.24 kg/(m^2) as calculated from the following:    Height as of 3/30/18: 5' 2\" (1.575 m).    Weight as of this encounter: 138 lb (62.6 kg)..  BP completed using cuff size: regular    Mallory Blackman CMA    "

## 2018-09-01 ENCOUNTER — MYC REFILL (OUTPATIENT)
Dept: FAMILY MEDICINE | Facility: CLINIC | Age: 38
End: 2018-09-01

## 2018-09-01 DIAGNOSIS — J20.9 ACUTE BRONCHITIS WITH SYMPTOMS > 10 DAYS: ICD-10-CM

## 2018-09-04 RX ORDER — ALBUTEROL SULFATE 0.83 MG/ML
2.5 SOLUTION RESPIRATORY (INHALATION) EVERY 4 HOURS PRN
Qty: 60 VIAL | Refills: 5 | Status: SHIPPED | OUTPATIENT
Start: 2018-09-04 | End: 2021-06-07

## 2018-09-04 NOTE — TELEPHONE ENCOUNTER
Message from [employer]:  Original authorizing provider: KIMBERLY Sanders would like a refill of the following medications:  albuterol (2.5 MG/3ML) 0.083% nebulizer solution [Dania Munoz PA-C]    Preferred pharmacy: [facility]/PHARMACY #[id number] [facility] [address],  AT [facility]    Comment:

## 2018-09-04 NOTE — TELEPHONE ENCOUNTER
Routing refill request to provider for review/approval because:  A break in medication.  Last refilled 2015.    CEDRIC ChenN RN

## 2018-09-05 ENCOUNTER — RADIANT APPOINTMENT (OUTPATIENT)
Dept: GENERAL RADIOLOGY | Facility: CLINIC | Age: 38
End: 2018-09-05
Attending: PODIATRIST
Payer: COMMERCIAL

## 2018-09-05 ENCOUNTER — OFFICE VISIT (OUTPATIENT)
Dept: PODIATRY | Facility: CLINIC | Age: 38
End: 2018-09-05
Payer: COMMERCIAL

## 2018-09-05 VITALS
WEIGHT: 138 LBS | BODY MASS INDEX: 25.24 KG/M2 | SYSTOLIC BLOOD PRESSURE: 180 MMHG | DIASTOLIC BLOOD PRESSURE: 120 MMHG | HEART RATE: 90 BPM

## 2018-09-05 DIAGNOSIS — M10.9 GOUT INVOLVING TOE, UNSPECIFIED CAUSE, UNSPECIFIED CHRONICITY, UNSPECIFIED LATERALITY: ICD-10-CM

## 2018-09-05 DIAGNOSIS — M10.9 GOUT INVOLVING TOE, UNSPECIFIED CAUSE, UNSPECIFIED CHRONICITY, UNSPECIFIED LATERALITY: Primary | ICD-10-CM

## 2018-09-05 LAB — URATE SERPL-MCNC: 9.4 MG/DL (ref 2.6–6)

## 2018-09-05 PROCEDURE — 84550 ASSAY OF BLOOD/URIC ACID: CPT | Performed by: PODIATRIST

## 2018-09-05 PROCEDURE — 73630 X-RAY EXAM OF FOOT: CPT | Mod: RT

## 2018-09-05 PROCEDURE — 99213 OFFICE O/P EST LOW 20 MIN: CPT | Performed by: PODIATRIST

## 2018-09-05 PROCEDURE — 36415 COLL VENOUS BLD VENIPUNCTURE: CPT | Performed by: PODIATRIST

## 2018-09-05 RX ORDER — METHYLPREDNISOLONE 4 MG
4 TABLET, DOSE PACK ORAL SEE ADMIN INSTRUCTIONS
Qty: 21 TABLET | Refills: 0 | Status: SHIPPED | OUTPATIENT
Start: 2018-09-05 | End: 2018-09-21

## 2018-09-05 NOTE — PATIENT INSTRUCTIONS
Patient to follow up with Primary Care provider regarding elevated blood pressure.  Weight management plan: Patient was referred to their PCP to discuss a diet and exercise plan.     We wish you continued good healing. If you have any questions or concerns, please do not hesitate to contact us at [phone]    Please remember to call and schedule a follow up appointment if one was recommended at your earliest convenience.   PODIATRY CLINIC HOURS  TELEPHONE NUMBER    Dr. Shiva Qiu D.P.M FAC FAS    Clinics:  Sherine Elizondo  [facility]    Tiffany Santoyo [facility]   Tuesday 1PM-6PM  [place]  Wednesday 7AM-2PM  [facility]  Thursday 10AM-6PM  [place]  Friday 7AM-3PM  [place]  Specialty schedulers:   [phone] to make an appointment with any Specialty Provider.        Urgent Care locations:    [facility] Monday-Friday 5 pm - 9 pm. Saturday-Sunday 9 am -5pm    Monday-Friday 11 am - 9 pm Saturday 9 am - 5 pm     Monday-Sunday 12 noon-8PM [phone]     If you need a medication refill, please contact us you may need lab work and/or a follow up visit prior to your refill (i.e. Antifungal medications).    [employer] (secure e-mail communication and access to your chart) to send a message or to make an appointment.    If MRI needed please call Mikhail Fonseca at [phone]

## 2018-09-05 NOTE — MR AVS SNAPSHOT
After Visit Summary   9/5/2018    Zulay Rossi    MRN: [id number]           Patient Information     Date Of Birth          1980        Visit Information        Provider Department      9/5/2018 12:45 PM Shiva Qiu, DPDONALDO [facility]'s Diagnoses     Gout involving toe, unspecified cause, unspecified chronicity, unspecified laterality    -  1      Care Instructions    Patient to follow up with Primary Care provider regarding elevated blood pressure.  Weight management plan: Patient was referred to their PCP to discuss a diet and exercise plan.     We wish you continued good healing. If you have any questions or concerns, please do not hesitate to contact us at [phone]    Please remember to call and schedule a follow up appointment if one was recommended at your earliest convenience.   PODIATRY CLINIC HOURS  TELEPHONE NUMBER    Dr. Shiva Qiu DSylvesterP.M [facility]    Clinics:  [facility]    Tiffany Santoyo [facility]   Tuesday 1PM-6PM  [place] [place] [facility]""[facility]  Wednesday 7AM-2PM  [facility]  Thursday 10AM-6PM  [place]  Friday 7AM-3PM  [place]  Specialty schedulers:   [phone] to make an appointment with any Specialty Provider.        Urgent Care locations:    [facility] Monday-Friday 5 pm - 9 pm. Saturday-Sunday 9 am -5pm    Monday-Friday 11 am - 9 pm Saturday 9 am - 5 pm     Monday-Sunday 12 noon-8PM [phone]     If you need a medication refill, please contact us you may need lab work and/or a follow up visit prior to your refill (i.e. Antifungal medications).    [employer] (secure e-mail communication and access to your chart) to send a message or to make an appointment.    If MRI needed please call Mikhail Fonseca at [phone]                    Follow-ups after your visit        Who to contact     If you have questions or need follow  up information about today's clinic visit or your schedule please contact [facility] directly at [phone].  Normal or non-critical lab and imaging results will be communicated to you by MyChart, letter or phone within 4 business days after the clinic has received the results. If you do not hear from us within 7 days, please contact the clinic through [employer] or phone. If you have a critical or abnormal lab result, we will notify you by phone as soon as possible.  Submit refill requests through [employer] [facility] or call your pharmacy and they will forward the refill request to us. Please allow 3 business days for your refill to be completed.          Additional Information About Your Visit        MyChart Information     [employer] gives you secure access to your electronic health record. If you see a primary care provider, you can also send messages to your care team and make appointments. If you have questions, please call your primary care clinic.  If you do not have a primary care provider, please call [phone] and they will assist you.        Care EveryWhere ID     This is your Care EveryWhere ID. This could be used by other organizations to access your [place] medical records  [id number]        Your Vitals Were     Pulse BMI (Body Mass Index)                90 25.24 kg/m2           Blood Pressure from Last 3 Encounters:   09/05/18 (!) 180/120   08/27/18 (!) 179/110   04/12/18 124/79    Weight from Last 3 Encounters:   09/05/18 138 lb (62.6 kg)   08/27/18 138 lb (62.6 kg)   04/12/18 141 lb (64 kg)              We Performed the Following     Uric acid          Today's Medication Changes          These changes are accurate as of 9/5/18  1:47 PM.  If you have any questions, ask your nurse or doctor.               Start taking these medicines.        Dose/Directions    methylPREDNISolone 4 MG tablet   Commonly known as:  MEDROL DOSEPAK   Used for:  Gout involving toe, unspecified cause, unspecified  chronicity, unspecified laterality   Started by:  Shiva Qiu DPM        Dose:  4 mg   Take 1 tablet (4 mg) by mouth See Admin Instructions follow package directions   Quantity:  21 tablet   Refills:  0         These medicines have changed or have updated prescriptions.        Dose/Directions    metoclopramide 10 MG tablet   Commonly known as:  REGLAN   This may have changed:  Another medication with the same name was removed. Continue taking this medication, and follow the directions you see here.   Used for:  Upper abdominal pain   Changed by:  Shiva Qiu DPM        Dose:  10 mg   Take 1 tablet (10 mg) by mouth 2 times daily as needed   Quantity:  20 tablet   Refills:  0            Where to get your medicines      These medications were sent to [facility]/pharmacy #[id number] - [facility] [address],  [address] [place] [address], [facility] [address]     Phone:  [phone]     methylPREDNISolone 4 MG tablet                Primary Care Provider Office Phone # Fax #    Dania Munoz PA-C [phone] [address] [facility] [address]        Equal Access to Services     [facility] : Hadii aad ku hadasho Soomaali, waaxda luqadaha, qaybta kaalmada adeegyada, lori valera haymikayla irwin . So [facility] [phone].    ATENCIÓN: Si habla español, tiene a delgado disposición servicios gratuitos de asistencia lingüística. [facility] [phone].    We comply with applicable federal civil rights laws and [place] laws. We do not discriminate on the basis of race, color, national origin, age, disability, sex, sexual orientation, or gender identity.            Thank you!     Thank you for choosing [facility]  for your care. Our goal is always to provide you with excellent care. Hearing back from our patients is one way we can continue to improve our services. Please take a few minutes to complete the written survey that you  may receive in the mail after your visit with us. Thank you!             Your Updated Medication List - Protect others around you: Learn how to safely use, store and throw away your medicines at www.disposemymeds.org.          This list is accurate as of 9/5/18  1:47 PM.  Always use your most recent med list.                   Brand Name Dispense Instructions for use Diagnosis    * albuterol 108 (90 Base) MCG/ACT inhaler    PROAIR HFA    18 Inhaler    Inhale 1-2 puffs into the lungs every 4 hours as needed for shortness of breath / dyspnea or wheezing    Acute bronchospasm       * albuterol (2.5 MG/3ML) 0.083% neb solution     60 vial    Take 1 vial (2.5 mg) by nebulization every 4 hours as needed for shortness of breath / dyspnea or wheezing    Acute bronchitis with symptoms > 10 days       amitriptyline 25 MG tablet    ELAVIL    30 tablet    Take 1 tablet (25 mg) by mouth At Bedtime    Chronic pain syndrome, Chronic pancreatitis, unspecified pancreatitis type (H)       amLODIPine 10 MG tablet    NORVASC    90 tablet    TAKE 1 TABLET (10 MG) BY MOUTH DAILY    Benign essential hypertension       buPROPion 150 MG 12 hr tablet    WELLBUTRIN SR    60 tablet    Take 1 tablet (150 mg) by mouth 2 times daily    Tobacco abuse       CREON 98153-84835 units Cpep per EC capsule   Generic drug:  amylase-lipase-protease     120 capsule    TAKE 1 CAPSULE (24,000 UNITS) BY MOUTH 4 TIMES DAILY AS NEEDED TAKE WITH MEALS AND ONE SNACK    Idiopathic chronic pancreatitis (H)       fenofibrate micronized 200 MG capsule    LOFIBRA    90 capsule    Take 1 capsule (200 mg) by mouth every morning (before breakfast)    Acute pancreatitis, unspecified complication status, unspecified pancreatitis type       gabapentin 300 MG capsule    NEURONTIN    180 capsule    Take 2 capsules (600 mg) by mouth 3 times daily    Idiopathic chronic pancreatitis (H)       LIPITOR 10 MG tablet   Generic drug:  atorvastatin      Take 10 mg by mouth daily         losartan 50 MG tablet    COZAAR    90 tablet    TAKE 1 TABLET (50 MG) BY MOUTH DAILY    Benign essential hypertension       methylPREDNISolone 4 MG tablet    MEDROL DOSEPAK    21 tablet    Take 1 tablet (4 mg) by mouth See Admin Instructions follow package directions    Gout involving toe, unspecified cause, unspecified chronicity, unspecified laterality       metoclopramide 10 MG tablet    REGLAN    20 tablet    Take 1 tablet (10 mg) by mouth 2 times daily as needed    Upper abdominal pain       montelukast 10 MG tablet    SINGULAIR    30 tablet    TAKE 1 TABLET (10 MG) BY MOUTH AT BEDTIME FOR COUGH/ALLERGIES    Cough, Multiple allergies       ondansetron 8 MG tablet    ZOFRAN    18 tablet    TAKE 1 TABLET (8 MG) BY MOUTH EVERY 8 HOURS AS NEEDED FOR NAUSEA    Nausea       oxyCODONE-acetaminophen 5-325 MG per tablet    PERCOCET    12 tablet    Take 1 tablet by mouth every 8 hours as needed for pain Maximum 4 tablet(s) per day    Chronic pancreatitis, unspecified pancreatitis type (H)       predniSONE 20 MG tablet    DELTASONE    20 tablet    Take 3 tabs (60 mg) by mouth daily x 3 days, 2 tabs (40 mg) daily x 3 days, 1 tab (20 mg) daily x 3 days, then 1/2 tab (10 mg) x 3 days.    Acute idiopathic gout of right foot       sertraline 100 MG tablet    ZOLOFT    90 tablet    Take 1 tablet (100 mg) by mouth daily    Episode of recurrent major depressive disorder, unspecified depression episode severity (H)       tiZANidine 2 MG tablet    ZANAFLEX    60 tablet    Take 1 tablet (2 mg) by mouth 3 times daily as needed for muscle spasms /pain    Chronic pain syndrome, Chronic pancreatitis, unspecified pancreatitis type (H)       ZANTAC 150 MG tablet   Generic drug:  ranitidine      Take  by mouth daily.        * Notice:  This list has 2 medication(s) that are the same as other medications prescribed for you. Read the directions carefully, and ask your doctor or other care provider to review them with you.
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
## 2018-09-07 DIAGNOSIS — J98.01 ACUTE BRONCHOSPASM: ICD-10-CM

## 2018-09-10 RX ORDER — ALBUTEROL SULFATE 90 UG/1
AEROSOL, METERED RESPIRATORY (INHALATION)
Qty: 8.5 INHALER | Refills: 4 | Status: SHIPPED | OUTPATIENT
Start: 2018-09-10 | End: 2021-06-07

## 2018-09-18 NOTE — PROGRESS NOTES
"  SUBJECTIVE:                                                    Zulay Rossi is a 38 year old female who presents to clinic today for the following health issues:  Seen for acute recurrent pancreatitis in hospital.  Some necrosis noted this time.  Given pain medications and antibiotics.  Has f/u with her gastroenterology on oct. 4th at [facility]. Patient has had several bouts of recurrent pancreatitis. H/o smoking and high TGs. She has seen the lipid clinic before also.  She has a pain contract with me. [place] [facility]-no concerns seen, patient has been given narcotics in the emergency room and hospital or from me.  We discussed side effects and risks of this medication today including addiction, tolerance, increased sedation, respiratory depression, and possibly overdose if not taken as directed.   They will not mix this medication with alcohol or other sedating medications. They will not drive after taking this medication.  Patient states understanding. They verbally agreed to use their medication responsibly.     She avoids alcohol and no longer smokes.   She felt better after antibiotics. Is still taking antibiotics.   Is supposed to go back to work on Monday.         Copied from care everywhere:    \"Patient Active Hospital Problem List:  Acute Recurrent Pancreatitis  Assessment: 37 yo female with 2 yr history of recurrent pancreatitis initially thought related to alcohol and/or hypertriglyceridemia (>2500), admitted with recurrent flare of inflammation. She quit smoking in March. No clear reason for this flare.  With persistent /worsening distention and pain, she had CT scan Friday showing necrosis and new portal and splenic vein thrombus. Antibiotics were initiated Saturday. Anticoagulation discussed with hematology, GI and medicine and decided risk > benefit at this juncture.  Clinical improvement apparent with HR to normal, distention gone, pain greatly improved.     Plan: - low fat diet as tolerated  - IV " "antibiotics while in-house; d/c on cipro/flagyl for 5-10 day course  - mobilize  - she has follow up with Dr. Moore, GI, which I will try to move to early October.       Yanni Ward PA-C  9-9-18\"      She had gout and took indocin, they thought possibly this contributed.  Prednisone did not help her gout but medrol dose pack helped.   Gets gout about once per year.  Not needing preventative medication. Uric acid level was high.       Hospital Follow-up Visit:    Hospital/Nursing Home/IP Rehab Facility: [facility]  Date of Admission: 09/09/2018  Date of Discharge: 09/19/2018  Reason(s) for Admission: Abdominal pain, nausea and vomiting            Problems taking medications regularly:  None       Medication changes since discharge: None       Problems adhering to non-medication therapy:  None    Summary of hospitalization:  CareEverywhere information obtained and reviewed  Diagnostic Tests/Treatments reviewed.  Follow up needed: gi  Other Healthcare Providers Involved in Patient s Care:         None  Update since discharge: improved.     Post Discharge Medication Reconciliation: discharge medications reconciled and changed, per note/orders (see AVS).  Plan of care communicated with patient     Coding guidelines for this visit:  Type of Medical   Decision Making Face-to-Face Visit       within 7 Days of discharge Face-to-Face Visit        within 14 days of discharge   Moderate Complexity [id number]   High Complexity [id number]              Problem list and histories reviewed & adjusted, as indicated.  Additional history: as documented    Patient Active Problem List   Diagnosis     Mood disorder (H)     Headache     Goiter, non-toxic     Acute gouty arthritis     Elevated uric acid in blood     Ovarian cyst     S/P LEEP of cervix     Tobacco abuse     Acute bronchospasm     Benign essential hypertension     Controlled substance agreement signed     Chronic pain syndrome     Health Care Home     Chronic " pancreatitis, unspecified pancreatitis type (H)     Idiopathic chronic pancreatitis (H)     Fatty infiltration of liver     Past Surgical History:   Procedure Laterality Date     BIOPSY  7/4/2013    Colonoscopy     COLONOSCOPY       HC VAGINAL DELIVERY, NO EPISIOTOMY/FORCEPS  11/2000     LEEP TX, CERVICAL  2003    MILI III     SOFT TISSUE SURGERY  5/2016    Lacerated tendon repair on right ring finger       Social History   Substance Use Topics     Smoking status: Former Smoker     Packs/day: 0.50     Years: 15.00     Types: Cigarettes     Start date: 4/5/2018     Smokeless tobacco: Never Used     Alcohol use Yes      Comment: occ     Family History   Problem Relation Age of Onset     Unknown/Adopted Mother          Current Outpatient Prescriptions   Medication Sig Dispense Refill     albuterol (2.5 MG/3ML) 0.083% neb solution Take 1 vial (2.5 mg) by nebulization every 4 hours as needed for shortness of breath / dyspnea or wheezing 60 vial 5     albuterol (PROAIR HFA/PROVENTIL HFA/VENTOLIN HFA) 108 (90 Base) MCG/ACT inhaler INHALE 1-2 PUFFS EVERY 4 HOURS AS NEEDED FOR SHORTNESS OF BREATH/DYSPNEA/WHEEZING 8.5 Inhaler 4     amitriptyline (ELAVIL) 25 MG tablet Take 1 tablet (25 mg) by mouth At Bedtime 30 tablet 3     amLODIPine (NORVASC) 10 MG tablet TAKE 1 TABLET (10 MG) BY MOUTH DAILY 90 tablet 3     atorvastatin (LIPITOR) 10 MG tablet Take 10 mg by mouth daily       ciprofloxacin (CIPRO) 500 MG tablet Take 500 mg by mouth       CREON 38093-27569 units CPEP per EC capsule TAKE 1 CAPSULE (24,000 UNITS) BY MOUTH 4 TIMES DAILY AS NEEDED TAKE WITH MEALS AND ONE SNACK 120 capsule 1     fenofibrate micronized (LOFIBRA) 200 MG capsule Take 1 capsule (200 mg) by mouth every morning (before breakfast) 90 capsule 1     HYDROmorphone (DILAUDID) 2 MG tablet Take 1-4 mg by mouth       losartan (COZAAR) 100 MG tablet Take 100 mg by mouth daily       metoclopramide (REGLAN) 10 MG tablet Take 1 tablet (10 mg) by mouth 2 times daily  as needed 20 tablet 0     metroNIDAZOLE (FLAGYL) 500 MG tablet Take 500 mg by mouth       montelukast (SINGULAIR) 10 MG tablet TAKE 1 TABLET (10 MG) BY MOUTH AT BEDTIME FOR COUGH/ALLERGIES 30 tablet 3     ondansetron (ZOFRAN) 8 MG tablet TAKE 1 TABLET (8 MG) BY MOUTH EVERY 8 HOURS AS NEEDED FOR NAUSEA 18 tablet 3     oxyCODONE-acetaminophen (PERCOCET) 5-325 MG per tablet Take 1 tablet by mouth every 8 hours as needed for pain Maximum 4 tablet(s) per day 12 tablet 0     ranitidine (ZANTAC) 150 MG tablet Take  by mouth daily.       sertraline (ZOLOFT) 100 MG tablet Take 1 tablet (100 mg) by mouth daily 90 tablet 3     tiZANidine (ZANAFLEX) 2 MG tablet Take 1 tablet (2 mg) by mouth 3 times daily as needed for muscle spasms /pain 60 tablet 0     No Known Allergies    ROS:  Constitutional, HEENT, cardiovascular, pulmonary, GI, , musculoskeletal, neuro, skin, endocrine and psych systems are negative, except as otherwise noted.    OBJECTIVE:     /82  Pulse 111  Temp 98.5  F (36.9  C) (Oral)  Resp 14  Wt 126 lb (57.2 kg)  SpO2 99%  Breastfeeding? No  BMI 23.05 kg/m2  Body mass index is 23.05 kg/(m^2).  GENERAL: healthy, alert and no distress  RESP: lungs clear to auscultation - no rales, rhonchi or wheezes  CV: regular rate and rhythm, normal S1 S2, no S3 or S4, no murmur, click or rub, no peripheral edema and peripheral pulses strong  ABDOMEN: {:soft, tender LUQ, no rigidity or guarding  MS: no gross musculoskeletal defects noted, no edema  NEURO: Normal strength and tone, mentation intact and speech normal  PSYCH: mentation appears normal, affect normal/bright        ASSESSMENT/PLAN:   ASSESSMENT / PLAN:  (K86.1) Chronic pancreatitis, unspecified pancreatitis type (H)  Comment:   Plan: oxyCODONE-acetaminophen (PERCOCET) 5-325 MG per        tablet, tiZANidine (ZANAFLEX) 2 MG tablet,         amitriptyline (ELAVIL) 25 MG tablet          Continue to follow with gastroenterology  appt made for TGs we need an  updated number on that  Continue low fat/no nicotine/no alcohol diet  Wean off dilautid (still has some from hospital rx) and change to percocet, wean off as soon as possible (patient usually takes narcotics for awhile after each hospital stay)    (G89.4) Chronic pain syndrome  Comment:   Plan: tiZANidine (ZANAFLEX) 2 MG tablet,         amitriptyline (ELAVIL) 25 MG tablet          Recheck 3 months, sooner if needed    Patient Instructions   Please return fasting for cholesterol recheck, you can make a lab only appointment for this.  No food or drink other than water for 10 hours.          Dania Munoz PA-C  [facility]

## 2018-09-21 ENCOUNTER — OFFICE VISIT (OUTPATIENT)
Dept: FAMILY MEDICINE | Facility: CLINIC | Age: 38
End: 2018-09-21
Payer: COMMERCIAL

## 2018-09-21 VITALS
RESPIRATION RATE: 14 BRPM | SYSTOLIC BLOOD PRESSURE: 122 MMHG | TEMPERATURE: 98.5 F | DIASTOLIC BLOOD PRESSURE: 82 MMHG | BODY MASS INDEX: 23.05 KG/M2 | WEIGHT: 126 LBS | OXYGEN SATURATION: 99 % | HEART RATE: 94 BPM

## 2018-09-21 DIAGNOSIS — K86.1 CHRONIC PANCREATITIS, UNSPECIFIED PANCREATITIS TYPE (H): ICD-10-CM

## 2018-09-21 DIAGNOSIS — G89.4 CHRONIC PAIN SYNDROME: ICD-10-CM

## 2018-09-21 PROCEDURE — 99214 OFFICE O/P EST MOD 30 MIN: CPT | Performed by: PHYSICIAN ASSISTANT

## 2018-09-21 RX ORDER — OXYCODONE AND ACETAMINOPHEN 5; 325 MG/1; MG/1
1 TABLET ORAL EVERY 8 HOURS PRN
Qty: 15 TABLET | Refills: 0 | Status: SHIPPED | OUTPATIENT
Start: 2018-09-21 | End: 2018-10-10

## 2018-09-21 RX ORDER — HYDROMORPHONE HYDROCHLORIDE 2 MG/1
1-4 TABLET ORAL
COMMUNITY
Start: 2018-09-19 | End: 2019-01-03

## 2018-09-21 RX ORDER — HYDROMORPHONE HYDROCHLORIDE 2 MG/1
1-4 TABLET ORAL
Qty: 10 TABLET | Status: CANCELLED | OUTPATIENT
Start: 2018-09-21

## 2018-09-21 RX ORDER — LOSARTAN POTASSIUM 100 MG/1
100 TABLET ORAL DAILY
Qty: 90 TABLET | Refills: 1 | COMMUNITY
Start: 2018-09-21 | End: 2019-03-07

## 2018-09-21 RX ORDER — METRONIDAZOLE 500 MG/1
500 TABLET ORAL
COMMUNITY
Start: 2018-09-19 | End: 2019-03-07

## 2018-09-21 RX ORDER — LOSARTAN POTASSIUM 100 MG/1
100 TABLET ORAL DAILY
COMMUNITY
End: 2018-09-21

## 2018-09-21 RX ORDER — TIZANIDINE 2 MG/1
2 TABLET ORAL 3 TIMES DAILY PRN
Qty: 60 TABLET | Refills: 11 | Status: SHIPPED | OUTPATIENT
Start: 2018-09-21 | End: 2019-02-17

## 2018-09-21 RX ORDER — CIPROFLOXACIN 500 MG/1
500 TABLET, FILM COATED ORAL
COMMUNITY
Start: 2018-09-19 | End: 2018-09-29

## 2018-09-21 ASSESSMENT — ANXIETY QUESTIONNAIRES
3. WORRYING TOO MUCH ABOUT DIFFERENT THINGS: NOT AT ALL
7. FEELING AFRAID AS IF SOMETHING AWFUL MIGHT HAPPEN: NOT AT ALL
1. FEELING NERVOUS, ANXIOUS, OR ON EDGE: NOT AT ALL
6. BECOMING EASILY ANNOYED OR IRRITABLE: NOT AT ALL
2. NOT BEING ABLE TO STOP OR CONTROL WORRYING: NOT AT ALL
IF YOU CHECKED OFF ANY PROBLEMS ON THIS QUESTIONNAIRE, HOW DIFFICULT HAVE THESE PROBLEMS MADE IT FOR YOU TO DO YOUR WORK, TAKE CARE OF THINGS AT HOME, OR GET ALONG WITH OTHER PEOPLE: NOT DIFFICULT AT ALL
GAD7 TOTAL SCORE: 0
5. BEING SO RESTLESS THAT IT IS HARD TO SIT STILL: NOT AT ALL

## 2018-09-21 ASSESSMENT — PATIENT HEALTH QUESTIONNAIRE - PHQ9: 5. POOR APPETITE OR OVEREATING: NOT AT ALL

## 2018-09-21 NOTE — NURSING NOTE
"Chief Complaint   Patient presents with     Hospital F/U     [facility] F/U for Pancreatitis      Health Maintenance     orders pended       Initial /82  Pulse 111  Temp 98.5  F (36.9  C) (Oral)  Resp 14  Wt 126 lb (57.2 kg)  SpO2 99%  Breastfeeding? No  BMI 23.05 kg/m2 Estimated body mass index is 23.05 kg/(m^2) as calculated from the following:    Height as of 3/30/18: 5' 2\" (1.575 m).    Weight as of this encounter: 126 lb (57.2 kg).  Medication Reconciliation: complete      Carlos Manuel Horowitz MA    "

## 2018-09-21 NOTE — PATIENT INSTRUCTIONS
Please return fasting for cholesterol recheck, you can make a lab only appointment for this.  No food or drink other than water for 10 hours.

## 2018-09-21 NOTE — MR AVS SNAPSHOT
After Visit Summary   9/21/2018    Zulay Rossi    MRN: [id number]           Patient Information     Date Of Birth          1980        Visit Information        Provider Department      9/21/2018 10:40 AM Dania Munoz PA-C [facility]        Today's Diagnoses     Chronic pancreatitis, unspecified pancreatitis type (H)        Chronic pain syndrome          Care Instructions    Please return fasting for cholesterol recheck, you can make a lab only appointment for this.  No food or drink other than water for 10 hours.            Follow-ups after your visit        Who to contact     If you have questions or need follow up information about today's clinic visit or your schedule please contact [facility] directly at [phone].  Normal or non-critical lab and imaging results will be communicated to you by National Institutes of Health (NIH)hart, letter or phone within 4 business days after the clinic has received the results. If you do not hear from us within 7 days, please contact the clinic through National Institutes of Health (NIH)hart or phone. If you have a critical or abnormal lab result, we will notify you by phone as soon as possible.  Submit refill requests through [email or web address] or call your pharmacy and they will forward the refill request to us. Please allow 3 business days for your refill to be completed.          Additional Information About Your Visit        MyChart Information     [email or web address] gives you secure access to your electronic health record. If you see a primary care provider, you can also send messages to your care team and make appointments. If you have questions, please call your primary care clinic.  If you do not have a primary care provider, please call [phone] and they will assist you.        Care EveryWhere ID     This is your Care EveryWhere ID. This could be used by other organizations to access your [facility] medical records  [id number]        Your Vitals Were     Pulse Temperature  Respirations Pulse Oximetry Breastfeeding? BMI (Body Mass Index)    94 98.5  F (36.9  C) (Oral) 14 99% No 23.05 kg/m2       Blood Pressure from Last 3 Encounters:   09/21/18 122/82   09/05/18 (!) 180/120   08/27/18 (!) 179/110    Weight from Last 3 Encounters:   09/21/18 126 lb (57.2 kg)   09/05/18 138 lb (62.6 kg)   08/27/18 138 lb (62.6 kg)              Today, you had the following     No orders found for display         Today's Medication Changes          These changes are accurate as of 9/21/18 11:15 AM.  If you have any questions, ask your nurse or doctor.               These medicines have changed or have updated prescriptions.        Dose/Directions    losartan 100 MG tablet   Commonly known as:  COZAAR   This may have changed:  Another medication with the same name was removed. Continue taking this medication, and follow the directions you see here.   Changed by:  Dania Munoz PA-C        Dose:  100 mg   Take 1 tablet (100 mg) by mouth daily   Quantity:  90 tablet   Refills:  1            Where to get your medicines      These medications were sent to [facility]/pharmacy #[id number] [address] [address]     Phone:  [phone]     amitriptyline 25 MG tablet    tiZANidine 2 MG tablet         Some of these will need a paper prescription and others can be bought over the counter.  Ask your nurse if you have questions.     Bring a paper prescription for each of these medications     oxyCODONE-acetaminophen 5-325 MG per tablet               Information about OPIOIDS     PRESCRIPTION OPIOIDS: WHAT YOU NEED TO KNOW   We gave you an opioid (narcotic) pain medicine. It is important to manage your pain, but opioids are not always the best choice. You should first try all the other options your care team gave you. Take this medicine for as short a time (and as few doses) as possible.    Some activities can increase  your pain, such as bandage changes or therapy sessions. It may help to take your pain medicine 30 to 60 minutes before these activities. Reduce your stress by getting enough sleep, working on hobbies you enjoy and practicing relaxation or meditation. Talk to your care team about ways to manage your pain beyond prescription opioids.    These medicines have risks:    DO NOT drive when on new or higher doses of pain medicine. These medicines can affect your alertness and reaction times, and you could be arrested for driving under the influence (DUI). If you need to use opioids long-term, talk to your care team about driving.    DO NOT operate heavy machinery    DO NOT do any other dangerous activities while taking these medicines.    DO NOT drink any alcohol while taking these medicines.     If the opioid prescribed includes acetaminophen, DO NOT take with any other medicines that contain acetaminophen. Read all labels carefully. Look for the word  acetaminophen  or  Tylenol.  Ask your pharmacist if you have questions or are unsure.    You can get addicted to pain medicines, especially if you have a history of addiction (chemical, alcohol or substance dependence). Talk to your care team about ways to reduce this risk.    All opioids tend to cause constipation. Drink plenty of water and eat foods that have a lot of fiber, such as fruits, vegetables, prune juice, apple juice and high-fiber cereal. Take a laxative (Miralax, milk of magnesia, Colace, Senna) if you don t move your bowels at least every other day. Other side effects include upset stomach, sleepiness, dizziness, throwing up, tolerance (needing more of the medicine to have the same effect), physical dependence and slowed breathing.    Store your pills in a secure place, locked if possible. We will not replace any lost or stolen medicine. If you don t finish your medicine, please throw away (dispose) as directed by your pharmacist. The [place] Pollution  Control Agency has more information about safe disposal: https://www.pca.[facility].[place]/living-green/managing-unwanted-medications         Primary Care Provider Office Phone # Fax #    Dania Munoz PA-C [phone]       [address] [address] [facility] [address]        Equal Access to Services     [facility]: Hadii aad ku hadasho Soomaali, waaxda luqadaha, qaybta kaalmada adeegyada, waxay idiin hayaan adeeg brandon lajeffn . So [facility] [phone].    ATENCIÓN: Si habla español, tiene a delgado disposición servicios gratuitos de asistencia lingüística. Garrett al [phone].    We comply with applicable federal civil rights laws and [place] laws. We do not discriminate on the basis of race, color, national origin, age, disability, sex, sexual orientation, or gender identity.            Thank you!     Thank you for choosing [facility]  for your care. Our goal is always to provide you with excellent care. Hearing back from our patients is one way we can continue to improve our services. Please take a few minutes to complete the written survey that you may receive in the mail after your visit with us. Thank you!             Your Updated Medication List - Protect others around you: Learn how to safely use, store and throw away your medicines at www.disposemymeds.org.          This list is accurate as of 9/21/18 11:15 AM.  Always use your most recent med list.                   Brand Name Dispense Instructions for use Diagnosis    * albuterol (2.5 MG/3ML) 0.083% neb solution     60 vial    Take 1 vial (2.5 mg) by nebulization every 4 hours as needed for shortness of breath / dyspnea or wheezing    Acute bronchitis with symptoms > 10 days       * albuterol 108 (90 Base) MCG/ACT inhaler    PROAIR HFA/PROVENTIL HFA/VENTOLIN HFA    8.5 Inhaler    INHALE 1-2 PUFFS EVERY 4 HOURS AS NEEDED FOR SHORTNESS OF BREATH/DYSPNEA/WHEEZING    Acute bronchospasm       amitriptyline 25 MG tablet    ELAVIL     30 tablet    Take 1 tablet (25 mg) by mouth At Bedtime    Chronic pain syndrome, Chronic pancreatitis, unspecified pancreatitis type (H)       amLODIPine 10 MG tablet    NORVASC    90 tablet    TAKE 1 TABLET (10 MG) BY MOUTH DAILY    Benign essential hypertension       ciprofloxacin 500 MG tablet    CIPRO     Take 500 mg by mouth        CREON 13972-79315 units Cpep per EC capsule   Generic drug:  amylase-lipase-protease     120 capsule    TAKE 1 CAPSULE (24,000 UNITS) BY MOUTH 4 TIMES DAILY AS NEEDED TAKE WITH MEALS AND ONE SNACK    Idiopathic chronic pancreatitis (H)       fenofibrate micronized 200 MG capsule    LOFIBRA    90 capsule    Take 1 capsule (200 mg) by mouth every morning (before breakfast)    Acute pancreatitis, unspecified complication status, unspecified pancreatitis type       HYDROmorphone 2 MG tablet    DILAUDID     Take 1-4 mg by mouth        LIPITOR 10 MG tablet   Generic drug:  atorvastatin      Take 10 mg by mouth daily        losartan 100 MG tablet    COZAAR    90 tablet    Take 1 tablet (100 mg) by mouth daily        metoclopramide 10 MG tablet    REGLAN    20 tablet    Take 1 tablet (10 mg) by mouth 2 times daily as needed    Upper abdominal pain       metroNIDAZOLE 500 MG tablet    FLAGYL     Take 500 mg by mouth        montelukast 10 MG tablet    SINGULAIR    30 tablet    TAKE 1 TABLET (10 MG) BY MOUTH AT BEDTIME FOR COUGH/ALLERGIES    Cough, Multiple allergies       ondansetron 8 MG tablet    ZOFRAN    18 tablet    TAKE 1 TABLET (8 MG) BY MOUTH EVERY 8 HOURS AS NEEDED FOR NAUSEA    Nausea       oxyCODONE-acetaminophen 5-325 MG per tablet    PERCOCET    15 tablet    Take 1 tablet by mouth every 8 hours as needed for pain Maximum 4 tablet(s) per day    Chronic pancreatitis, unspecified pancreatitis type (H)       sertraline 100 MG tablet    ZOLOFT    90 tablet    Take 1 tablet (100 mg) by mouth daily    Episode of recurrent major depressive disorder, unspecified depression episode severity  (H)       tiZANidine 2 MG tablet    ZANAFLEX    60 tablet    Take 1 tablet (2 mg) by mouth 3 times daily as needed for muscle spasms /pain    Chronic pain syndrome, Chronic pancreatitis, unspecified pancreatitis type (H)       ZANTAC 150 MG tablet   Generic drug:  ranitidine      Take  by mouth daily.        * Notice:  This list has 2 medication(s) that are the same as other medications prescribed for you. Read the directions carefully, and ask your doctor or other care provider to review them with you.

## 2018-09-22 ASSESSMENT — PATIENT HEALTH QUESTIONNAIRE - PHQ9: SUM OF ALL RESPONSES TO PHQ QUESTIONS 1-9: 0

## 2018-09-22 ASSESSMENT — ANXIETY QUESTIONNAIRES: GAD7 TOTAL SCORE: 0

## 2018-09-23 ENCOUNTER — TRANSFERRED RECORDS (OUTPATIENT)
Dept: HEALTH INFORMATION MANAGEMENT | Facility: CLINIC | Age: 38
End: 2018-09-23

## 2018-10-01 RX ORDER — LOSARTAN POTASSIUM 100 MG/1
TABLET ORAL
Qty: 90 TABLET | Refills: 0 | OUTPATIENT
Start: 2018-10-01

## 2018-10-03 DIAGNOSIS — K86.1 CHRONIC PANCREATITIS, UNSPECIFIED PANCREATITIS TYPE (H): ICD-10-CM

## 2018-10-03 LAB
CHOLEST SERPL-MCNC: 153 MG/DL
HDLC SERPL-MCNC: 43 MG/DL
LDLC SERPL CALC-MCNC: ABNORMAL MG/DL
LDLC SERPL DIRECT ASSAY-MCNC: 65 MG/DL
NONHDLC SERPL-MCNC: 110 MG/DL
TRIGL SERPL-MCNC: 471 MG/DL

## 2018-10-03 PROCEDURE — 80061 LIPID PANEL: CPT | Performed by: PHYSICIAN ASSISTANT

## 2018-10-03 PROCEDURE — 36415 COLL VENOUS BLD VENIPUNCTURE: CPT | Performed by: PHYSICIAN ASSISTANT

## 2018-10-03 PROCEDURE — 83721 ASSAY OF BLOOD LIPOPROTEIN: CPT | Performed by: PHYSICIAN ASSISTANT

## 2018-10-04 ENCOUNTER — TRANSFERRED RECORDS (OUTPATIENT)
Dept: HEALTH INFORMATION MANAGEMENT | Facility: CLINIC | Age: 38
End: 2018-10-04

## 2018-10-10 ENCOUNTER — TRANSFERRED RECORDS (OUTPATIENT)
Dept: HEALTH INFORMATION MANAGEMENT | Facility: CLINIC | Age: 38
End: 2018-10-10

## 2018-10-10 ENCOUNTER — MYC REFILL (OUTPATIENT)
Dept: FAMILY MEDICINE | Facility: CLINIC | Age: 38
End: 2018-10-10

## 2018-10-10 DIAGNOSIS — K86.1 CHRONIC PANCREATITIS, UNSPECIFIED PANCREATITIS TYPE (H): ICD-10-CM

## 2018-10-10 NOTE — TELEPHONE ENCOUNTER
Message from [employer]:  Original authorizing provider: KIMBERLY Sanders would like a refill of the following medications:  oxyCODONE-acetaminophen (PERCOCET) 5-325 MG per tablet [Dania Munoz PA-C]    Preferred pharmacy: [facility]/PHARMACY #[id number] - [place], [place] - [address],  AT [address] [facility]    Comment:  Still having intermittent pain from most recent hospitalization due to pancreatitis, most often occurring after eating. Saw GI on 10/4/2018 and had ultrasound 10/10/2018 ordered by Dr. Moore.

## 2018-10-10 NOTE — TELEPHONE ENCOUNTER
Controlled Substance Refill Request for percocet  Problem List Complete:  No       PROVIDER TO CONSIDER COMPLETION OF PROBLEM LIST AND OVERVIEW/CONTROLLED SUBSTANCE AGREEMENT      Last Written Prescription Date:  9/21/18  Last Fill Quantity: 15,   # refills: 0      Last Office Visit with [facility] primary care provider: 9/21/18      Future Office visit:       Controlled substance agreement on file: Yes:  Date 4/12/18.      Processing:  Patient will  in clinic       checked in past 6 months?  Yes 9/21/18  Barbara Overton RN

## 2018-10-11 RX ORDER — OXYCODONE AND ACETAMINOPHEN 5; 325 MG/1; MG/1
1 TABLET ORAL EVERY 8 HOURS PRN
Qty: 15 TABLET | Refills: 0 | Status: SHIPPED | OUTPATIENT
Start: 2018-10-11 | End: 2018-11-07

## 2018-10-11 NOTE — TELEPHONE ENCOUNTER
Saw last hospitalization note. She can have one refill as discussed with pain clinic per hospital visit.     Try to wean off medications asap, I don't want her getting addicting as we have discussed.     Dania Munoz PA-C

## 2018-10-30 ENCOUNTER — TRANSFERRED RECORDS (OUTPATIENT)
Dept: HEALTH INFORMATION MANAGEMENT | Facility: CLINIC | Age: 38
End: 2018-10-30

## 2018-11-01 DIAGNOSIS — I10 BENIGN ESSENTIAL HYPERTENSION: ICD-10-CM

## 2018-11-01 RX ORDER — LOSARTAN POTASSIUM 50 MG/1
TABLET ORAL
Qty: 90 TABLET | Refills: 1 | OUTPATIENT
Start: 2018-11-01

## 2018-11-01 RX ORDER — AMLODIPINE BESYLATE 10 MG/1
TABLET ORAL
Qty: 90 TABLET | Refills: 0 | Status: SHIPPED | OUTPATIENT
Start: 2018-11-01 | End: 2019-03-07

## 2018-11-06 DIAGNOSIS — E78.1 HIGH BLOOD TRIGLYCERIDES: Primary | ICD-10-CM

## 2018-11-06 RX ORDER — ATORVASTATIN CALCIUM 10 MG/1
TABLET, FILM COATED ORAL
Qty: 90 TABLET | Refills: 3 | Status: SHIPPED | OUTPATIENT
Start: 2018-11-06 | End: 2020-10-07

## 2018-11-07 ENCOUNTER — MYC REFILL (OUTPATIENT)
Dept: FAMILY MEDICINE | Facility: CLINIC | Age: 38
End: 2018-11-07

## 2018-11-07 DIAGNOSIS — K86.1 CHRONIC PANCREATITIS, UNSPECIFIED PANCREATITIS TYPE (H): ICD-10-CM

## 2018-11-07 NOTE — TELEPHONE ENCOUNTER
Message from [employer]:  Original authorizing provider: KIMBERLY Sanders would like a refill of the following medications:  oxyCODONE-acetaminophen (PERCOCET) 5-325 MG per tablet [Daina Munoz PA-C]    Preferred pharmacy: [facility]/PHARMACY #[id number] [facility] [address],  [address] [facility]    Comment:  Having symptoms of pancreatitis. Does Dania want me to come in for labs to have my lipase tested?

## 2018-11-07 NOTE — TELEPHONE ENCOUNTER
See pt question about labs.    Controlled Substance Refill Request for percocet  Problem List Complete:  No       PROVIDER TO CONSIDER COMPLETION OF PROBLEM LIST AND OVERVIEW/CONTROLLED SUBSTANCE AGREEMENT      Last Written Prescription Date:  10/11/18  Last Fill Quantity: 15,   # refills: 0      Last Office Visit with [facility] primary care provider: 9/21/18      Future Office visit:       Controlled substance agreement on file: Yes:  Date 4/12/18.      Processing:  Patient will  in clinic       checked in past 6 months?  Yes 9/21/18  Barbara Ovreton RN

## 2018-11-08 RX ORDER — OXYCODONE AND ACETAMINOPHEN 5; 325 MG/1; MG/1
1 TABLET ORAL EVERY 8 HOURS PRN
Qty: 15 TABLET | Refills: 0 | Status: SHIPPED | OUTPATIENT
Start: 2018-11-08 | End: 2019-03-07

## 2018-11-26 ENCOUNTER — TRANSFERRED RECORDS (OUTPATIENT)
Dept: HEALTH INFORMATION MANAGEMENT | Facility: CLINIC | Age: 38
End: 2018-11-26

## 2018-12-03 DIAGNOSIS — R10.10 UPPER ABDOMINAL PAIN: ICD-10-CM

## 2018-12-05 RX ORDER — METOCLOPRAMIDE 10 MG/1
TABLET ORAL
Qty: 60 TABLET | Refills: 0 | Status: SHIPPED | OUTPATIENT
Start: 2018-12-05 | End: 2019-02-17

## 2018-12-16 ENCOUNTER — TRANSFERRED RECORDS (OUTPATIENT)
Dept: HEALTH INFORMATION MANAGEMENT | Facility: CLINIC | Age: 38
End: 2018-12-16

## 2018-12-17 ENCOUNTER — TRANSFERRED RECORDS (OUTPATIENT)
Dept: HEALTH INFORMATION MANAGEMENT | Facility: CLINIC | Age: 38
End: 2018-12-17

## 2018-12-17 LAB
ALT SERPL-CCNC: 21 IU/L (ref 8–45)
AST SERPL-CCNC: 32 IU/L (ref 2–40)
TRIGL SERPL-MCNC: 1494 MG/DL

## 2018-12-18 LAB
CREAT SERPL-MCNC: 0.64 MG/DL (ref 0.57–1.11)
GFR SERPL CREATININE-BSD FRML MDRD: >60 ML/MIN/1.73M2
GLUCOSE SERPL-MCNC: 183 MG/DL (ref 65–100)
HBA1C MFR BLD: 6.7 % (ref 0–5.7)
POTASSIUM SERPL-SCNC: 3.8 MMOL/L (ref 3.5–5)

## 2018-12-24 DIAGNOSIS — Z88.9 MULTIPLE ALLERGIES: ICD-10-CM

## 2018-12-24 DIAGNOSIS — R05.9 COUGH: ICD-10-CM

## 2018-12-24 NOTE — TELEPHONE ENCOUNTER
Your patient would like us to reach jout on their behalf to determine if it is appropriate to restart the daily controller therapy.

## 2018-12-27 RX ORDER — MONTELUKAST SODIUM 10 MG/1
TABLET ORAL
Qty: 30 TABLET | Refills: 11 | Status: SHIPPED | OUTPATIENT
Start: 2018-12-27 | End: 2021-06-07

## 2018-12-31 NOTE — PROGRESS NOTES
SUBJECTIVE:   Zulay Rossi is a 38 year old female who presents to clinic today for the following health issues:    Diabetes Follow-up    Patient is checking blood sugars: twice daily.    Blood sugar testing frequency justification: New DX  Results are as follows:         am - 200         bedtime - 200/400    Diabetic concerns: None     Symptoms of hypoglycemia (low blood sugar): none     Paresthesias (numbness or burning in feet) or sores: No     Date of last diabetic eye exam: Pt is due    Patient just diagnoed with diabetes a1c 6.7 in hospital. She was given insulin there and sent home without any but told to f/u with PCP.     She has been checking her glucose---Sugars are typically around 200. 120 was her lowest.  Have not been trending down per patient therefore I would expect her next a1c to be poorer. She had quit smoking and stopped all alcohol already due to her pancreatitis. She also trys to maintain a low fat low carb low cholesterol diet due to her chronic pancreatitis. She is in the hospital frequently for acute on chronic symptoms. Often requiring iv hydration and pain control.      233 was her fasting am sugar today.       Diabetes Management Resources    Hyperlipidemia Follow-Up      Rate your low fat/cholesterol diet?: good    Taking statin?  Yes, no muscle aches from statin    Other lipid medications/supplements?:  none    Hypertension Follow-up      Outpatient blood pressures are not being checked.    Low Salt Diet: no added salt    BP Readings from Last 2 Encounters:   01/03/19 (!) 153/96   09/21/18 122/82     LDL Cholesterol Calculated (mg/dL)   Date Value   10/03/2018     Cannot estimate LDL when triglyceride exceeds 400 mg/dL   01/19/2018 21     LDL Cholesterol Direct (mg/dL)   Date Value   10/03/2018 65       Amount of exercise or physical activity: None    Problems taking medications regularly: No    Medication side effects: none    Diet: regular (no restrictions)          Hospital  Follow-up Visit:    Hospital/Nursing Home/IP Rehab Facility: [facility]  Date of Admission: 12/10/2018  Date of Discharge: 12/13/2018  Reason(s) for Admission: Possible pancratitis            Problems taking medications regularly:  None       Medication changes since discharge: None       Problems adhering to non-medication therapy:  None    Summary of hospitalization:  CareEverywhere information obtained and reviewed  Diagnostic Tests/Treatments reviewed.  Follow up needed: none  Other Healthcare Providers Involved in Patient s Care:         None  Update since discharge: improved.     Post Discharge Medication Reconciliation: discharge medications reconciled and changed, per note/orders (see AVS).  Plan of care communicated with patient     Coding guidelines for this visit:  Type of Medical   Decision Making Face-to-Face Visit       within 7 Days of discharge Face-to-Face Visit        within 14 days of discharge   Moderate Complexity [id number]   High Complexity 48669 11114          She is not fasting.   She has f/u with her gastroenterology in a few months per patient.     Problem list and histories reviewed & adjusted, as indicated.  Additional history: as documented    Patient Active Problem List   Diagnosis     Mood disorder (H)     Headache     Goiter, non-toxic     Acute gouty arthritis     Elevated uric acid in blood     Ovarian cyst     S/P LEEP of cervix     Tobacco abuse     Acute bronchospasm     Benign essential hypertension     Controlled substance agreement signed     Chronic pain syndrome     Health Care Home     Chronic pancreatitis, unspecified pancreatitis type (H)     Idiopathic chronic pancreatitis (H)     Fatty infiltration of liver     Type 2 diabetes mellitus with hyperglycemia, with long-term current use of insulin (H)     Past Surgical History:   Procedure Laterality Date     BIOPSY  7/4/2013    Colonoscopy     COLONOSCOPY       HC VAGINAL DELIVERY, NO EPISIOTOMY/FORCEPS  11/2000      GONZALESP TX, CERVICAL  2003    MILI III     SOFT TISSUE SURGERY  5/2016    Lacerated tendon repair on right ring finger       Social History     Tobacco Use     Smoking status: Former Smoker     Packs/day: 0.50     Years: 15.00     Pack years: 7.50     Types: Cigarettes     Start date: 4/5/2018     Smokeless tobacco: Never Used   Substance Use Topics     Alcohol use: Yes     Comment: occ     Family History   Problem Relation Age of Onset     Unknown/Adopted Mother          Current Outpatient Medications   Medication Sig Dispense Refill     albuterol (2.5 MG/3ML) 0.083% neb solution Take 1 vial (2.5 mg) by nebulization every 4 hours as needed for shortness of breath / dyspnea or wheezing 60 vial 5     albuterol (PROAIR HFA/PROVENTIL HFA/VENTOLIN HFA) 108 (90 Base) MCG/ACT inhaler INHALE 1-2 PUFFS EVERY 4 HOURS AS NEEDED FOR SHORTNESS OF BREATH/DYSPNEA/WHEEZING 8.5 Inhaler 4     amitriptyline (ELAVIL) 25 MG tablet Take 1 tablet (25 mg) by mouth At Bedtime 30 tablet 11     amLODIPine (NORVASC) 10 MG tablet TAKE 1 TABLET BY MOUTH DAILY 90 tablet 0     atorvastatin (LIPITOR) 10 MG tablet TAKE 1 TABLET DAILY IN THE EVENING FOR CHOLESTEROL 90 tablet 3     CREON 64428-81392 units CPEP per EC capsule TAKE 1 CAPSULE (24,000 UNITS) BY MOUTH 4 TIMES DAILY AS NEEDED TAKE WITH MEALS AND ONE SNACK 120 capsule 1     fenofibrate micronized (LOFIBRA) 200 MG capsule Take 1 capsule (200 mg) by mouth every morning (before breakfast) 90 capsule 1     insulin glargine (LANTUS SOLOSTAR PEN) 100 UNIT/ML pen Inject 10 Units Subcutaneous At Bedtime 3 mL 0     losartan (COZAAR) 100 MG tablet Take 1 tablet (100 mg) by mouth daily 90 tablet 1     metoclopramide (REGLAN) 10 MG tablet TAKE 1 TABLET (10 MG) BY MOUTH 2 TIMES DAILY AS NEEDED 60 tablet 0     montelukast (SINGULAIR) 10 MG tablet TAKE 1 TABLET (10 MG) BY MOUTH AT BEDTIME FOR COUGH/ALLERGIES 30 tablet 11     ondansetron (ZOFRAN) 8 MG tablet TAKE 1 TABLET (8 MG) BY MOUTH EVERY 8 HOURS AS  "NEEDED FOR NAUSEA 18 tablet 3     oxyCODONE-acetaminophen (PERCOCET) 5-325 MG per tablet Take 1 tablet by mouth every 8 hours as needed for pain No refills without appt. Maximum 4 tablet(s) per day 15 tablet 0     ranitidine (ZANTAC) 150 MG tablet Take  by mouth daily.       sertraline (ZOLOFT) 100 MG tablet Take 1 tablet (100 mg) by mouth daily 90 tablet 3     tiZANidine (ZANAFLEX) 2 MG tablet Take 1 tablet (2 mg) by mouth 3 times daily as needed for muscle spasms /pain 60 tablet 11     No Known Allergies    Reviewed and updated as needed this visit by clinical staff  Tobacco  Allergies  Meds       Reviewed and updated as needed this visit by Provider         ROS:  Constitutional, HEENT, cardiovascular, pulmonary, GI, , musculoskeletal, neuro, skin, endocrine and psych systems are negative, except as otherwise noted.    OBJECTIVE:     /88   Pulse 96   Temp 97.3  F (36.3  C) (Oral)   Resp 16   Ht 1.575 m (5' 2\")   Wt 57.6 kg (127 lb)   SpO2 100%   BMI 23.23 kg/m    Body mass index is 23.23 kg/m .  GENERAL: healthy, alert and no distress  RESP: lungs clear to auscultation - no rales, rhonchi or wheezes  CV: regular rate and rhythm, normal S1 S2, no S3 or S4, no murmur, click or rub, no peripheral edema and peripheral pulses strong  MS: no gross musculoskeletal defects noted, no edema  PSYCH: mentation appears normal, affect normal/bright        ASSESSMENT/PLAN:     ASSESSMENT / PLAN:  (E11.65,  Z79.4) Type 2 diabetes mellitus with hyperglycemia, with long-term current use of insulin (H)  (primary encounter diagnosis) new diagnosis  Comment: see below  Plan: DIABETES EDUCATOR REFERRAL, C-peptide, Insulin         level, Comprehensive metabolic panel,         Hemoglobin A1c, insulin glargine (LANTUS         SOLOSTAR PEN) 100 UNIT/ML pen          Unsure if type 2 or secondary to pancreatitis symptoms requiring insulin. Until lab tests come back, will start on very low dose insulin at bedtime. Side " effects discussed.     Patient Instructions   Start insulin at bedtime  I will follow up with labs  Schedule with diabetic educator         Dania Munoz PA-C  [facility]

## 2019-01-03 ENCOUNTER — OFFICE VISIT (OUTPATIENT)
Dept: FAMILY MEDICINE | Facility: CLINIC | Age: 39
End: 2019-01-03
Payer: COMMERCIAL

## 2019-01-03 VITALS
DIASTOLIC BLOOD PRESSURE: 88 MMHG | TEMPERATURE: 97.3 F | BODY MASS INDEX: 23.37 KG/M2 | OXYGEN SATURATION: 100 % | SYSTOLIC BLOOD PRESSURE: 126 MMHG | HEIGHT: 62 IN | WEIGHT: 127 LBS | RESPIRATION RATE: 16 BRPM | HEART RATE: 96 BPM

## 2019-01-03 DIAGNOSIS — Z79.4 TYPE 2 DIABETES MELLITUS WITH HYPERGLYCEMIA, WITH LONG-TERM CURRENT USE OF INSULIN (H): Primary | ICD-10-CM

## 2019-01-03 DIAGNOSIS — E11.65 TYPE 2 DIABETES MELLITUS WITH HYPERGLYCEMIA, WITH LONG-TERM CURRENT USE OF INSULIN (H): Primary | ICD-10-CM

## 2019-01-03 LAB
ALBUMIN SERPL-MCNC: 4.1 G/DL (ref 3.4–5)
ALP SERPL-CCNC: 65 U/L (ref 40–150)
ALT SERPL W P-5'-P-CCNC: 24 U/L (ref 0–50)
ANION GAP SERPL CALCULATED.3IONS-SCNC: 11 MMOL/L (ref 3–14)
AST SERPL W P-5'-P-CCNC: 76 U/L (ref 0–45)
BILIRUB SERPL-MCNC: 0.5 MG/DL (ref 0.2–1.3)
BUN SERPL-MCNC: 5 MG/DL (ref 7–30)
CALCIUM SERPL-MCNC: 9.3 MG/DL (ref 8.5–10.1)
CHLORIDE SERPL-SCNC: 97 MMOL/L (ref 94–109)
CO2 SERPL-SCNC: 25 MMOL/L (ref 20–32)
CREAT SERPL-MCNC: 0.57 MG/DL (ref 0.52–1.04)
GFR SERPL CREATININE-BSD FRML MDRD: >90 ML/MIN/{1.73_M2}
GLUCOSE SERPL-MCNC: 304 MG/DL (ref 70–99)
HBA1C MFR BLD: 7.6 % (ref 0–5.6)
INSULIN SERPL-ACNC: 9.2 MU/L (ref 3–25)
POTASSIUM SERPL-SCNC: 4.1 MMOL/L (ref 3.4–5.3)
PROT SERPL-MCNC: 8.3 G/DL (ref 6.8–8.8)
SODIUM SERPL-SCNC: 133 MMOL/L (ref 133–144)

## 2019-01-03 PROCEDURE — 83525 ASSAY OF INSULIN: CPT | Performed by: PHYSICIAN ASSISTANT

## 2019-01-03 PROCEDURE — 80053 COMPREHEN METABOLIC PANEL: CPT | Performed by: PHYSICIAN ASSISTANT

## 2019-01-03 PROCEDURE — 99214 OFFICE O/P EST MOD 30 MIN: CPT | Performed by: PHYSICIAN ASSISTANT

## 2019-01-03 PROCEDURE — 84681 ASSAY OF C-PEPTIDE: CPT | Performed by: PHYSICIAN ASSISTANT

## 2019-01-03 PROCEDURE — 83036 HEMOGLOBIN GLYCOSYLATED A1C: CPT | Performed by: PHYSICIAN ASSISTANT

## 2019-01-03 PROCEDURE — 36415 COLL VENOUS BLD VENIPUNCTURE: CPT | Performed by: PHYSICIAN ASSISTANT

## 2019-01-03 ASSESSMENT — MIFFLIN-ST. JEOR: SCORE: 1214.32

## 2019-01-04 LAB — C PEPTIDE SERPL-MCNC: 3.1 NG/ML (ref 0.9–6.9)

## 2019-01-07 ENCOUNTER — TELEPHONE (OUTPATIENT)
Dept: FAMILY MEDICINE | Facility: CLINIC | Age: 39
End: 2019-01-07

## 2019-01-07 DIAGNOSIS — E11.65 TYPE 2 DIABETES MELLITUS WITH HYPERGLYCEMIA, WITH LONG-TERM CURRENT USE OF INSULIN (H): Primary | ICD-10-CM

## 2019-01-07 DIAGNOSIS — Z79.4 TYPE 2 DIABETES MELLITUS WITH HYPERGLYCEMIA, WITH LONG-TERM CURRENT USE OF INSULIN (H): Primary | ICD-10-CM

## 2019-01-07 RX ORDER — METFORMIN HCL 500 MG
500 TABLET, EXTENDED RELEASE 24 HR ORAL 2 TIMES DAILY WITH MEALS
Qty: 60 TABLET | Refills: 1 | Status: SHIPPED | OUTPATIENT
Start: 2019-01-07 | End: 2019-03-07

## 2019-01-07 NOTE — TELEPHONE ENCOUNTER
Pt notified of provider message as written.  Pt verbalized good understanding.  Barbara Overton BSN, RN

## 2019-01-07 NOTE — TELEPHONE ENCOUNTER
Left message on answering machine for patient to call back to [phone].  Barbara Overton BSN, RN

## 2019-01-07 NOTE — TELEPHONE ENCOUNTER
Notes recorded by Dania Munoz PA-C on 1/7/2019 at 1:48 PM CST  PLEASE CALL PATIENT: Dear Zulay,      It was a pleasure to see you at your recent office visit.  Your test results are listed below.  Your labs show that you still have insulin in your body so as of now you have type 2 diabetes and we can use metformin (oral tab) instead of insulin. You can stop the insulin and start metformin and f/u with me in 1 month please.  Please bring some of your sugar readings in.  Watch our for diarrhea with the metformin. You can get weight loss with this (some).  With your pancreatitis, we will need to re-evaluate your for your insulin needs as time goes on, as you may need insulin at some point.         If you have any questions or concerns, please call the clinic at [phone].    Sincerely,  Dania Munoz PA-C

## 2019-01-07 NOTE — RESULT ENCOUNTER NOTE
PLEASE CALL PATIENT: Dear Zulay,      It was a pleasure to see you at your recent office visit.  Your test results are listed below.  Your labs show that you still have insulin in your body so as of now you have type 2 diabetes and we can use metformin (oral tab) instead of insulin. You can stop the insulin and start metformin and f/u with me in 1 month please.  Please bring some of your sugar readings in.  Watch our for diarrhea with the metformin. You can get weight loss with this (some).  With your pancreatitis, we will need to re-evaluate your for your insulin needs as time goes on, as you may need insulin at some point.         If you have any questions or concerns, please call the clinic at [phone].    Sincerely,  Dania Munoz PA-C

## 2019-01-16 ENCOUNTER — MYC REFILL (OUTPATIENT)
Dept: FAMILY MEDICINE | Facility: CLINIC | Age: 39
End: 2019-01-16

## 2019-01-16 DIAGNOSIS — R11.0 NAUSEA: ICD-10-CM

## 2019-01-16 NOTE — TELEPHONE ENCOUNTER
Ondansetron refill  Last fill: 4/3/18 for 18 tabs with 3 refills; had been at [facility] with acute pancreatitis 4/2/18  Last OV SARA Munoz PA-C on 1/3/19  Forwarding to pcp for advisement.  Daisy Mccallum RN

## 2019-01-17 RX ORDER — ONDANSETRON 8 MG/1
8 TABLET, FILM COATED ORAL EVERY 8 HOURS PRN
Qty: 18 TABLET | Refills: 3 | Status: SHIPPED | OUTPATIENT
Start: 2019-01-17 | End: 2019-02-17

## 2019-02-15 ENCOUNTER — MYC MEDICAL ADVICE (OUTPATIENT)
Dept: FAMILY MEDICINE | Facility: CLINIC | Age: 39
End: 2019-02-15

## 2019-02-15 DIAGNOSIS — Z79.4 TYPE 2 DIABETES MELLITUS WITH HYPERGLYCEMIA, WITH LONG-TERM CURRENT USE OF INSULIN (H): Primary | ICD-10-CM

## 2019-02-15 DIAGNOSIS — E11.65 TYPE 2 DIABETES MELLITUS WITH HYPERGLYCEMIA, WITH LONG-TERM CURRENT USE OF INSULIN (H): Primary | ICD-10-CM

## 2019-02-17 DIAGNOSIS — R10.10 UPPER ABDOMINAL PAIN: ICD-10-CM

## 2019-02-17 DIAGNOSIS — R11.0 NAUSEA: ICD-10-CM

## 2019-02-17 DIAGNOSIS — K86.1 CHRONIC PANCREATITIS, UNSPECIFIED PANCREATITIS TYPE (H): ICD-10-CM

## 2019-02-17 DIAGNOSIS — G89.4 CHRONIC PAIN SYNDROME: ICD-10-CM

## 2019-02-18 RX ORDER — TIZANIDINE 2 MG/1
2 TABLET ORAL 3 TIMES DAILY PRN
Qty: 60 TABLET | Refills: 1 | Status: SHIPPED | OUTPATIENT
Start: 2019-02-18 | End: 2019-08-14

## 2019-02-18 RX ORDER — ONDANSETRON 8 MG/1
TABLET, FILM COATED ORAL
Qty: 18 TABLET | Refills: 3 | Status: SHIPPED | OUTPATIENT
Start: 2019-02-18 | End: 2019-03-07

## 2019-02-18 RX ORDER — METOCLOPRAMIDE 10 MG/1
TABLET ORAL
Qty: 60 TABLET | Refills: 3 | Status: SHIPPED | OUTPATIENT
Start: 2019-02-18 | End: 2019-08-13

## 2019-02-19 ENCOUNTER — TELEPHONE (OUTPATIENT)
Dept: FAMILY MEDICINE | Facility: CLINIC | Age: 39
End: 2019-02-19

## 2019-02-19 DIAGNOSIS — E11.65 TYPE 2 DIABETES MELLITUS WITH HYPERGLYCEMIA, WITH LONG-TERM CURRENT USE OF INSULIN (H): Primary | ICD-10-CM

## 2019-02-19 DIAGNOSIS — Z79.4 TYPE 2 DIABETES MELLITUS WITH HYPERGLYCEMIA, WITH LONG-TERM CURRENT USE OF INSULIN (H): Primary | ICD-10-CM

## 2019-02-19 NOTE — TELEPHONE ENCOUNTER
Script clarification.  Pharmacy comments: Zulay's Ins now covers ACCU CHEK. She needs a new Rx for ACCU CHEK METER and Lancets to go with Strips that are covered.

## 2019-02-19 NOTE — TELEPHONE ENCOUNTER
Response to close potential gap in therapy.  Drug: Montelukast SOD 10 MG Tablet.  Last fill: 05-.  Pharmacy comments:  We spoke to your patient about asthma care and noticed your patient previously received daily controller therapy but has not filled it at [facility] pharmacy in the last 180 days.  Your patient would like us to reach out on their behalf to determine if it is appropriate to restart the daily controller therapy. Please send a new Rx for daily controller therapy if it is appropriate.

## 2019-02-20 NOTE — TELEPHONE ENCOUNTER
Left message on answering machine for patient to call back to [phone].  Barbara Overton BSN, RN

## 2019-02-20 NOTE — TELEPHONE ENCOUNTER
This completely depends on patients asthma symptoms/control. Does she have good control /symptoms now? If so we maybe dont need the ICS, if she doesn't then definitely she needs to be on it.     Ok to give verbal for her last ICS if patient states not good control, thankschi

## 2019-02-21 ENCOUNTER — DOCUMENTATION ONLY (OUTPATIENT)
Dept: FAMILY MEDICINE | Facility: CLINIC | Age: 39
End: 2019-02-21

## 2019-02-21 ENCOUNTER — MYC MEDICAL ADVICE (OUTPATIENT)
Dept: NURSING | Facility: CLINIC | Age: 39
End: 2019-02-21

## 2019-02-21 DIAGNOSIS — Z13.1 SCREENING FOR DIABETES MELLITUS: ICD-10-CM

## 2019-02-21 DIAGNOSIS — Z13.220 LIPID SCREENING: Primary | ICD-10-CM

## 2019-02-21 DIAGNOSIS — K86.1 CHRONIC PANCREATITIS, UNSPECIFIED PANCREATITIS TYPE (H): ICD-10-CM

## 2019-02-21 NOTE — PROGRESS NOTES
Patient is scheduled for a Lab appointment on 2/21/2019 for pre-visit labs.  Please enter future orders, or call to advise patient to cancel appointment if labs are not needed.  Thank you.

## 2019-02-21 NOTE — PROGRESS NOTES
Unable to determine what labs are needed, except for the micro albumin. Please order labs and close encounter.Susan Ortega MA/PIERCE    Physical 3/7/19

## 2019-02-25 NOTE — PROGRESS NOTES
"     SUBJECTIVE:   CC: Zulay Rossi is an 38 year old woman who presents for preventive health visit.     Healthy Habits:    Do you get at least three servings of calcium containing foods daily (dairy, green leafy vegetables, etc.)? { :561234::\"yes\"}    Amount of exercise or daily activities, outside of work: { :760227}    Problems taking medications regularly { :057660::\"No\"}    Medication side effects: { :408572::\"No\"}    Have you had an eye exam in the past two years? { :514318}    Do you see a dentist twice per year? { :345303}    Do you have sleep apnea, excessive snoring or daytime drowsiness?{ :669701}  {Outside tests to abstract? :461611}    {additional problems to add (Optional):809324}    Today's PHQ-2 Score:   PHQ-2 ( 1999 [employer]) 9/21/2018 1/11/2018   Q1: Little interest or pleasure in doing things 0 0   Q2: Feeling down, depressed or hopeless 0 0   PHQ-2 Score 0 0   Q1: Little interest or pleasure in doing things - -   Q2: Feeling down, depressed or hopeless - -   PHQ-2 Score - -     {PHQ-2 LOOK IN ASSESSMENTS (Optional) :716967}  Abuse: Current or Past(Physical, Sexual or Emotional)- {YES/NO/NA:590049}  Do you feel safe in your environment? {YES/NO/NA:057146}    Social History     Tobacco Use     Smoking status: Former Smoker     Packs/day: 0.50     Years: 15.00     Pack years: 7.50     Types: Cigarettes     Start date: 4/5/2018     Smokeless tobacco: Never Used   Substance Use Topics     Alcohol use: Yes     Comment: occ     If you drink alcohol do you typically have >3 drinks per day or >7 drinks per week? {ETOH :502757}                     Reviewed orders with patient.  Reviewed health maintenance and updated orders accordingly - {Yes/No:625711::\"Yes\"}  {Chronicprobdata (Optional):148728}    {Mammo Decision Support (Optional):163978}    Pertinent mammograms are reviewed under the imaging tab.  History of abnormal Pap smear: {PAP HX:567272}  PAP / HPV Latest Ref Rng & Units 9/2/2016 3/19/2015 " "5/7/2013   PAP - NIL NIL NIL   HPV 16 DNA NEG Negative Negative -   HPV 18 DNA NEG Negative Negative -   OTHER HR HPV NEG Negative Negative -     Reviewed and updated as needed this visit by clinical staff         Reviewed and updated as needed this visit by Provider        {HISTORY OPTIONS (Optional):481148}    ROS:  { :092881}    OBJECTIVE:   There were no vitals taken for this visit.  EXAM:  {Exam Choices:780384}    {Diagnostic Test Results (Optional):744270::\"Diagnostic Test Results:\",\"none \"}    ASSESSMENT/PLAN:   {Diag Picklist:316477}    COUNSELING:   {FEMALE COUNSELING MESSAGES:369397::\"Reviewed preventive health counseling, as reflected in patient instructions\"}    BP Readings from Last 1 Encounters:   01/03/19 126/88     Estimated body mass index is 23.23 kg/m  as calculated from the following:    Height as of 1/3/19: 1.575 m (5' 2\").    Weight as of 1/3/19: 57.6 kg (127 lb).    {BP Counseling- Complete if BP >= 120/80  (Optional):560561}  {Weight Management Plan (ACO) Complete if BMI is abnormal-  Ages 18-64  BMI >24.9.  Age 65+ with BMI <23 or >30 (Optional):510378}     reports that she has quit smoking. Her smoking use included cigarettes. She started smoking about 10 months ago. She has a 7.50 pack-year smoking history. she has never used smokeless tobacco.  {Tobacco Cessation -- Complete if patient is a smoker (Optional):754143}    Counseling Resources:  ATP IV Guidelines  Pooled Cohorts Equation Calculator  Breast Cancer Risk Calculator  FRAX Risk Assessment  ICSI Preventive Guidelines  Dietary Guidelines for Americans, 2010  [employer]'s MyPlate  ASA Prophylaxis  Lung CA Screening    Dania Munoz PA-C  [facility]  "

## 2019-03-06 ASSESSMENT — ENCOUNTER SYMPTOMS
EYE PAIN: 0
WEAKNESS: 0
FEVER: 0
HEMATURIA: 0
COUGH: 0
SORE THROAT: 0
NAUSEA: 0
DIARRHEA: 0
MYALGIAS: 0
CHILLS: 0
DYSURIA: 0
HEMATOCHEZIA: 0
DIZZINESS: 0
PARESTHESIAS: 0
SHORTNESS OF BREATH: 0
HEARTBURN: 0
NERVOUS/ANXIOUS: 0
HEADACHES: 0
ABDOMINAL PAIN: 1
ARTHRALGIAS: 0
FREQUENCY: 0
BREAST MASS: 0
CONSTIPATION: 0
JOINT SWELLING: 0
PALPITATIONS: 0

## 2019-03-07 ENCOUNTER — OFFICE VISIT (OUTPATIENT)
Dept: FAMILY MEDICINE | Facility: CLINIC | Age: 39
End: 2019-03-07
Payer: COMMERCIAL

## 2019-03-07 VITALS
SYSTOLIC BLOOD PRESSURE: 118 MMHG | WEIGHT: 129 LBS | TEMPERATURE: 98.1 F | DIASTOLIC BLOOD PRESSURE: 84 MMHG | HEART RATE: 86 BPM | BODY MASS INDEX: 23.74 KG/M2 | OXYGEN SATURATION: 100 % | HEIGHT: 62 IN

## 2019-03-07 DIAGNOSIS — F39 MOOD DISORDER (H): ICD-10-CM

## 2019-03-07 DIAGNOSIS — K86.1 CHRONIC PANCREATITIS, UNSPECIFIED PANCREATITIS TYPE (H): ICD-10-CM

## 2019-03-07 DIAGNOSIS — Z79.899 CONTROLLED SUBSTANCE AGREEMENT SIGNED: ICD-10-CM

## 2019-03-07 DIAGNOSIS — Z79.4 TYPE 2 DIABETES MELLITUS WITH HYPERGLYCEMIA, WITH LONG-TERM CURRENT USE OF INSULIN (H): Primary | ICD-10-CM

## 2019-03-07 DIAGNOSIS — R11.0 NAUSEA: ICD-10-CM

## 2019-03-07 DIAGNOSIS — Z13.89 SCREENING FOR DIABETIC PERIPHERAL NEUROPATHY: Primary | ICD-10-CM

## 2019-03-07 DIAGNOSIS — Z13.220 LIPID SCREENING: ICD-10-CM

## 2019-03-07 DIAGNOSIS — I10 BENIGN ESSENTIAL HYPERTENSION: ICD-10-CM

## 2019-03-07 DIAGNOSIS — E11.65 TYPE 2 DIABETES MELLITUS WITH HYPERGLYCEMIA, WITH LONG-TERM CURRENT USE OF INSULIN (H): Primary | ICD-10-CM

## 2019-03-07 DIAGNOSIS — G89.4 CHRONIC PAIN SYNDROME: ICD-10-CM

## 2019-03-07 DIAGNOSIS — Z13.89 SCREENING FOR DIABETIC PERIPHERAL NEUROPATHY: ICD-10-CM

## 2019-03-07 LAB
ALBUMIN SERPL-MCNC: 3.7 G/DL (ref 3.4–5)
ALP SERPL-CCNC: 52 U/L (ref 40–150)
ALT SERPL W P-5'-P-CCNC: 26 U/L (ref 0–50)
ANION GAP SERPL CALCULATED.3IONS-SCNC: 11 MMOL/L (ref 3–14)
AST SERPL W P-5'-P-CCNC: 62 U/L (ref 0–45)
BILIRUB SERPL-MCNC: 0.2 MG/DL (ref 0.2–1.3)
BUN SERPL-MCNC: 11 MG/DL (ref 7–30)
CALCIUM SERPL-MCNC: 9.2 MG/DL (ref 8.5–10.1)
CHLORIDE SERPL-SCNC: 98 MMOL/L (ref 94–109)
CHOLEST SERPL-MCNC: 213 MG/DL
CO2 SERPL-SCNC: 28 MMOL/L (ref 20–32)
CREAT SERPL-MCNC: 0.64 MG/DL (ref 0.52–1.04)
GFR SERPL CREATININE-BSD FRML MDRD: >90 ML/MIN/{1.73_M2}
GLUCOSE SERPL-MCNC: 159 MG/DL (ref 70–99)
HBA1C MFR BLD: 7.3 % (ref 0–5.6)
HDLC SERPL-MCNC: 51 MG/DL
LDLC SERPL CALC-MCNC: ABNORMAL MG/DL
LDLC SERPL DIRECT ASSAY-MCNC: 72 MG/DL
LIPASE SERPL-CCNC: 81 U/L (ref 73–393)
NONHDLC SERPL-MCNC: 162 MG/DL
POTASSIUM SERPL-SCNC: 4.2 MMOL/L (ref 3.4–5.3)
PROT SERPL-MCNC: 8.1 G/DL (ref 6.8–8.8)
SODIUM SERPL-SCNC: 137 MMOL/L (ref 133–144)
TRIGL SERPL-MCNC: 799 MG/DL

## 2019-03-07 PROCEDURE — 36415 COLL VENOUS BLD VENIPUNCTURE: CPT | Performed by: PHYSICIAN ASSISTANT

## 2019-03-07 PROCEDURE — 83721 ASSAY OF BLOOD LIPOPROTEIN: CPT | Performed by: PHYSICIAN ASSISTANT

## 2019-03-07 PROCEDURE — 80053 COMPREHEN METABOLIC PANEL: CPT | Performed by: PHYSICIAN ASSISTANT

## 2019-03-07 PROCEDURE — 83690 ASSAY OF LIPASE: CPT | Performed by: PHYSICIAN ASSISTANT

## 2019-03-07 PROCEDURE — 99207 C FOOT EXAM  NO CHARGE: CPT | Performed by: PHYSICIAN ASSISTANT

## 2019-03-07 PROCEDURE — 99215 OFFICE O/P EST HI 40 MIN: CPT | Performed by: PHYSICIAN ASSISTANT

## 2019-03-07 PROCEDURE — 83036 HEMOGLOBIN GLYCOSYLATED A1C: CPT | Performed by: PHYSICIAN ASSISTANT

## 2019-03-07 PROCEDURE — 80061 LIPID PANEL: CPT | Performed by: PHYSICIAN ASSISTANT

## 2019-03-07 RX ORDER — SERTRALINE HYDROCHLORIDE 100 MG/1
100 TABLET, FILM COATED ORAL DAILY
Qty: 90 TABLET | Refills: 3 | Status: SHIPPED | OUTPATIENT
Start: 2019-03-07 | End: 2020-03-23

## 2019-03-07 RX ORDER — ONDANSETRON 8 MG/1
8 TABLET, FILM COATED ORAL EVERY 8 HOURS PRN
Qty: 60 TABLET | Refills: 3 | Status: SHIPPED | OUTPATIENT
Start: 2019-03-07 | End: 2019-07-16

## 2019-03-07 RX ORDER — METFORMIN HCL 500 MG
500 TABLET, EXTENDED RELEASE 24 HR ORAL 2 TIMES DAILY WITH MEALS
Qty: 60 TABLET | Refills: 2 | Status: SHIPPED | OUTPATIENT
Start: 2019-03-07 | End: 2019-04-30

## 2019-03-07 RX ORDER — AMLODIPINE BESYLATE 10 MG/1
10 TABLET ORAL DAILY
Qty: 90 TABLET | Refills: 1 | Status: SHIPPED | OUTPATIENT
Start: 2019-03-07 | End: 2020-08-25

## 2019-03-07 RX ORDER — OXYCODONE AND ACETAMINOPHEN 5; 325 MG/1; MG/1
1 TABLET ORAL EVERY 8 HOURS PRN
Qty: 15 TABLET | Refills: 0 | Status: SHIPPED | OUTPATIENT
Start: 2019-03-07 | End: 2019-04-08

## 2019-03-07 RX ORDER — LOSARTAN POTASSIUM 100 MG/1
100 TABLET ORAL DAILY
Qty: 90 TABLET | Refills: 1 | Status: SHIPPED | OUTPATIENT
Start: 2019-03-07 | End: 2021-01-14

## 2019-03-07 ASSESSMENT — MIFFLIN-ST. JEOR: SCORE: 1223.39

## 2019-03-07 NOTE — PROGRESS NOTES
SUBJECTIVE:   Zulay Rossi is a 38 year old female who presents to clinic today for the following health issues:          Diabetes Follow-up    Patient is checking blood sugars: twice daily.    Blood sugar testing frequency justification: On insulin, frequency appropriate   Results are as follows:         140-160's    Recently has been running a bit higher due to pancreatitis she thinks    Diabetic concerns: Still having higher readings     Symptoms of hypoglycemia (low blood sugar): none     Paresthesias (numbness or burning in feet) or sores: No     Date of last diabetic eye exam: set up an eye exam still    BP Readings from Last 2 Encounters:   01/03/19 126/88   09/21/18 122/82     Hemoglobin A1C (%)   Date Value   01/03/2019 7.6 (H)   12/18/2018 6.7 (H)     LDL Cholesterol Calculated (mg/dL)   Date Value   10/03/2018     Cannot estimate LDL when triglyceride exceeds 400 mg/dL   01/19/2018 21     LDL Cholesterol Direct (mg/dL)   Date Value   10/03/2018 65       Diabetes Management Resources    Problem list and histories reviewed & adjusted, as indicated.  Additional history: as documented      Patient is here for diabetes and pain recheck.  Sugars were doing well until 2 weeks ago when she started having pain in her LUQ which occurs when she has pancreatitis flare. She has chronic pancreatitis with acute flares. [place]. Ok to fill. She tries to get by on tylenol and/or ibuprofen.  She sees gastroenterology every 6 months.  Her cpeptide and insulin levels in January were normal. She is on metformin and insulin at night.  Her sugars the past two weeks have been around 200.  She does not have mealtime insulin but is interested in this.   H/o high TGs, on medications for this, fasting labs pending now.   Avoids alcohol. No longer smoking.       Also needs mood disorder medications refilled. Denies suicidal or homicidal thoughts.  Feels they are working well.  Patient instructed to go to the emergency room or call 911 if these occur.    Patient Active Problem List   Diagnosis     Mood disorder (H)     Headache     Goiter, non-toxic     Acute gouty arthritis     Elevated uric acid in blood     Ovarian cyst     S/P LEEP of cervix     Tobacco abuse     Acute bronchospasm     Benign essential hypertension     Controlled substance agreement signed     Chronic pain syndrome     Health Care Home     Idiopathic chronic pancreatitis (H)     Fatty infiltration of liver     Type 2 diabetes mellitus with hyperglycemia, with long-term current use of insulin (H)     Past Surgical History:   Procedure Laterality Date     BIOPSY  7/4/2013    Colonoscopy     COLONOSCOPY       HC VAGINAL DELIVERY, NO EPISIOTOMY/FORCEPS  11/2000     LEEP TX, CERVICAL  2003    MILI III     SOFT TISSUE SURGERY  5/2016    Lacerated tendon repair on right ring finger       Social History     Tobacco Use     Smoking status: Former Smoker     Packs/day: 0.50     Years: 15.00     Pack years: 7.50     Types: Cigarettes     Start date: 4/5/2018     Smokeless tobacco: Never Used   Substance Use Topics     Alcohol use: Yes     Comment: occ     Family History   Problem Relation Age of Onset     Unknown/Adopted Mother          Current Outpatient Medications   Medication Sig Dispense Refill     amLODIPine (NORVASC) 10 MG tablet Take 1 tablet (10 mg) by mouth daily 90 tablet 1     insulin glargine (LANTUS SOLOSTAR PEN) 100 UNIT/ML pen Inject 16 Units Subcutaneous At Bedtime 3 mL 0     losartan (COZAAR) 100 MG tablet Take 1 tablet (100 mg) by mouth daily 90 tablet 1     metFORMIN (GLUCOPHAGE-XR) 500 MG 24 hr tablet Take 1 tablet (500 mg) by mouth 2 times daily (with meals) 60 tablet 2     ondansetron (ZOFRAN) 8 MG tablet Take 1 tablet (8 mg) by mouth every 8 hours as needed for nausea 60 tablet 3     oxyCODONE-acetaminophen (PERCOCET) 5-325 MG tablet Take 1 tablet by mouth every 8 hours as needed for pain Maximum 4  tablet(s) per day 15 tablet 0     sertraline (ZOLOFT) 100 MG tablet Take 1 tablet (100 mg) by mouth daily 90 tablet 3     albuterol (2.5 MG/3ML) 0.083% neb solution Take 1 vial (2.5 mg) by nebulization every 4 hours as needed for shortness of breath / dyspnea or wheezing 60 vial 5     albuterol (PROAIR HFA/PROVENTIL HFA/VENTOLIN HFA) 108 (90 Base) MCG/ACT inhaler INHALE 1-2 PUFFS EVERY 4 HOURS AS NEEDED FOR SHORTNESS OF BREATH/DYSPNEA/WHEEZING 8.5 Inhaler 4     amitriptyline (ELAVIL) 25 MG tablet Take 1 tablet (25 mg) by mouth At Bedtime 30 tablet 11     atorvastatin (LIPITOR) 10 MG tablet TAKE 1 TABLET DAILY IN THE EVENING FOR CHOLESTEROL 90 tablet 3     blood glucose (NO BRAND SPECIFIED) lancets standard Use to test blood sugar 3 times daily or as directed. For Accu Chek or brand per insurance 300 each 1     blood glucose (NO BRAND SPECIFIED) test strip Use to test blood sugar 3 times daily or as directed. verio one touch or brand per insurance. 300 each 1     blood glucose monitoring (NO BRAND SPECIFIED) meter device kit Use to test blood sugar 3 times daily or as directed.   For Accu Chek or brand per insurance 1 kit 0     CREON 97204-91558 units CPEP per EC capsule TAKE 1 CAPSULE (24,000 UNITS) BY MOUTH 4 TIMES DAILY AS NEEDED TAKE WITH MEALS AND ONE SNACK 120 capsule 1     fenofibrate micronized (LOFIBRA) 200 MG capsule Take 1 capsule (200 mg) by mouth every morning (before breakfast) 90 capsule 1     insulin pen needle (32G X 4 MM) 32G X 4 MM miscellaneous Use 1 pen needles daily or as directed. 100 each 3     metoclopramide (REGLAN) 10 MG tablet TAKE 1 TABLET (10 MG) BY MOUTH 2 TIMES DAILY AS NEEDED 60 tablet 3     montelukast (SINGULAIR) 10 MG tablet TAKE 1 TABLET (10 MG) BY MOUTH AT BEDTIME FOR COUGH/ALLERGIES 30 tablet 11     ranitidine (ZANTAC) 150 MG tablet Take  by mouth daily.       tiZANidine (ZANAFLEX) 2 MG tablet TAKE 1 TABLET (2 MG) BY MOUTH 3 TIMES DAILY AS NEEDED FOR MUSCLE SPASMS /PAIN 60  "tablet 1     No Known Allergies    Reviewed and updated as needed this visit by clinical staff  Tobacco  Allergies  Meds       Reviewed and updated as needed this visit by Provider         ROS:  Constitutional, HEENT, cardiovascular, pulmonary, GI, , musculoskeletal, neuro, skin, endocrine and psych systems are negative, except as otherwise noted.    OBJECTIVE:     /84   Pulse 86   Temp 98.1  F (36.7  C) (Oral)   Ht 5' 2\" (1.575 m)   Wt 129 lb (58.5 kg)   LMP 03/07/2019   SpO2 100%   BMI 23.59 kg/m    Body mass index is 23.59 kg/m .  GENERAL: healthy, alert and no distress  RESP: lungs clear to auscultation - no rales, rhonchi or wheezes  CV: regular rate and rhythm, normal S1 S2, no S3 or S4, no murmur, click or rub, no peripheral edema and peripheral pulses strong  ABDOMEN: {:tender LUQ, no ridigty or guarding  MS: no gross musculoskeletal defects noted, no edema  NEURO: Normal strength and tone, mentation intact and speech normal  PSYCH: mentation appears normal, affect normal/bright        ASSESSMENT/PLAN:     ASSESSMENT / PLAN:  (E11.65,  Z79.4) Type 2 diabetes mellitus with hyperglycemia, with long-term current use of insulin (H)  (primary encounter diagnosis)  Comment: worsening, will increase nighttime insluin for now to 14 to 16 units from 10.  Will refer to diabetic ed to discuss if sliding scale mealtime insulin is appropriate for her  Plan: DIABETES EDUCATOR REFERRAL, insulin glargine         (LANTUS SOLOSTAR PEN) 100 UNIT/ML pen,         metFORMIN (GLUCOPHAGE-XR) 500 MG 24 hr tablet            (F39) Mood disorder (H)  Comment: stable  Plan:     (Z13.89) Screening for diabetic peripheral neuropathy  Comment:   Plan: FOOT EXAM  NO CHARGE [67610.114], OPTOMETRY         REFERRAL, Hemoglobin A1c            (K86.1) Chronic pancreatitis, unspecified pancreatitis type (H)  Comment:   Plan: oxyCODONE-acetaminophen (PERCOCET) 5-325 MG         tablet          Using less narcotics overall  Recheck " 3 months sooner if neede    (I10) Benign essential hypertension  Comment:   Plan: losartan (COZAAR) 100 MG tablet, amLODIPine         (NORVASC) 10 MG tablet            (R11.0) Nausea  Comment:   Plan: ondansetron (ZOFRAN) 8 MG tablet            (G89.4) Chronic pain syndrome  Comment:   Plan:     (Z79.899) Controlled substance agreement signed  Comment:   Plan:     Return in about 3 months (around 2019) for med recheck, diabetes recheck.     Billin min spent face-to-face with patient. 30 min on history, 5 on exam, 5 on discussing diagnosis and treatment plan.       Dania Munoz PA-C  [facility]

## 2019-03-08 ENCOUNTER — MYC MEDICAL ADVICE (OUTPATIENT)
Dept: FAMILY MEDICINE | Facility: CLINIC | Age: 39
End: 2019-03-08

## 2019-03-08 ENCOUNTER — TELEPHONE (OUTPATIENT)
Dept: FAMILY MEDICINE | Facility: CLINIC | Age: 39
End: 2019-03-08

## 2019-03-08 NOTE — TELEPHONE ENCOUNTER
PLEASE CALL PATIENT: Dear Zulay,       It was a pleasure to see you at your recent office visit.  Your test results are listed below.  TRiglycerides are still really high at 799. Do you still have the phone number of the lipid specialist you saw in [place]? I would like you to see a lipid specialist again because your TGs are so high.  Your lipase was normal surprisingly.  Kidney function normal. Liver enzymes close to normal.  a1c is improved at 7.3 but with your recent elevated sugars I would keep our plan the same for your insulin and f/u with diabetic ed. Recheck with me in 3 months please.         If you have any questions or concerns, please call the clinic at [phone].     Sincerely,   Dania Munoz PA-C

## 2019-03-08 NOTE — RESULT ENCOUNTER NOTE
PLEASE CALL PATIENT: Dear Zulay,      It was a pleasure to see you at your recent office visit.  Your test results are listed below.  TRiglycerides are still really high at 799. Do you still have the phone number of the lipid specialist you saw in [place]? I would like you to see a lipid specialist again because your TGs are so high.  Your lipase was normal surprisingly.  Kidney function normal. Liver enzymes close to normal.  a1c is improved at 7.3 but with your recent elevated sugars I would keep our plan the same for your insulin and f/u with diabetic ed. Recheck with me in 3 months please.         If you have any questions or concerns, please call the clinic at [phone].    Sincerely,  Dania Munoz PA-C

## 2019-03-08 NOTE — TELEPHONE ENCOUNTER
Left message on answering machine for patient to call back to [phone] and sent [employer] message.  Barbara STEELEN, RN

## 2019-03-31 DIAGNOSIS — Z79.4 TYPE 2 DIABETES MELLITUS WITH HYPERGLYCEMIA, WITH LONG-TERM CURRENT USE OF INSULIN (H): ICD-10-CM

## 2019-03-31 DIAGNOSIS — E11.65 TYPE 2 DIABETES MELLITUS WITH HYPERGLYCEMIA, WITH LONG-TERM CURRENT USE OF INSULIN (H): ICD-10-CM

## 2019-04-02 NOTE — TELEPHONE ENCOUNTER
Refill request received within 30 days of last office visit with pcp.  Prescription is routed to the provider to please address refill.   Radha Navarrete RN

## 2019-04-05 RX ORDER — INSULIN GLARGINE 100 [IU]/ML
INJECTION, SOLUTION SUBCUTANEOUS
Qty: 15 ML | Refills: 2 | Status: SHIPPED | OUTPATIENT
Start: 2019-04-05 | End: 2019-11-12

## 2019-04-08 ENCOUNTER — OFFICE VISIT (OUTPATIENT)
Dept: FAMILY MEDICINE | Facility: CLINIC | Age: 39
End: 2019-04-08
Payer: COMMERCIAL

## 2019-04-08 VITALS
RESPIRATION RATE: 14 BRPM | TEMPERATURE: 98 F | HEART RATE: 106 BPM | OXYGEN SATURATION: 98 % | SYSTOLIC BLOOD PRESSURE: 134 MMHG | DIASTOLIC BLOOD PRESSURE: 88 MMHG | BODY MASS INDEX: 23.59 KG/M2 | WEIGHT: 129 LBS

## 2019-04-08 DIAGNOSIS — K86.1 CHRONIC PANCREATITIS, UNSPECIFIED PANCREATITIS TYPE (H): ICD-10-CM

## 2019-04-08 DIAGNOSIS — M10.9 ACUTE GOUTY ARTHRITIS: Primary | ICD-10-CM

## 2019-04-08 PROCEDURE — 99214 OFFICE O/P EST MOD 30 MIN: CPT | Performed by: PHYSICIAN ASSISTANT

## 2019-04-08 RX ORDER — INDOMETHACIN 50 MG/1
CAPSULE ORAL
Qty: 60 CAPSULE | Refills: 0 | Status: SHIPPED | OUTPATIENT
Start: 2019-04-08 | End: 2021-06-07

## 2019-04-08 RX ORDER — COLCHICINE 0.6 MG/1
TABLET ORAL
Qty: 20 TABLET | Refills: 0 | Status: SHIPPED | OUTPATIENT
Start: 2019-04-08 | End: 2021-06-07

## 2019-04-08 RX ORDER — OXYCODONE AND ACETAMINOPHEN 5; 325 MG/1; MG/1
1 TABLET ORAL EVERY 8 HOURS PRN
Qty: 15 TABLET | Refills: 0 | Status: SHIPPED | OUTPATIENT
Start: 2019-04-08 | End: 2019-06-19

## 2019-04-08 RX ORDER — METHYLPREDNISOLONE 4 MG
TABLET, DOSE PACK ORAL
Qty: 21 TABLET | Refills: 0 | Status: SHIPPED | OUTPATIENT
Start: 2019-04-08 | End: 2019-06-27

## 2019-04-08 ASSESSMENT — ANXIETY QUESTIONNAIRES
GAD7 TOTAL SCORE: 0
2. NOT BEING ABLE TO STOP OR CONTROL WORRYING: NOT AT ALL
IF YOU CHECKED OFF ANY PROBLEMS ON THIS QUESTIONNAIRE, HOW DIFFICULT HAVE THESE PROBLEMS MADE IT FOR YOU TO DO YOUR WORK, TAKE CARE OF THINGS AT HOME, OR GET ALONG WITH OTHER PEOPLE: NOT DIFFICULT AT ALL
7. FEELING AFRAID AS IF SOMETHING AWFUL MIGHT HAPPEN: NOT AT ALL
3. WORRYING TOO MUCH ABOUT DIFFERENT THINGS: NOT AT ALL
5. BEING SO RESTLESS THAT IT IS HARD TO SIT STILL: NOT AT ALL
6. BECOMING EASILY ANNOYED OR IRRITABLE: NOT AT ALL
1. FEELING NERVOUS, ANXIOUS, OR ON EDGE: NOT AT ALL

## 2019-04-08 ASSESSMENT — PATIENT HEALTH QUESTIONNAIRE - PHQ9
5. POOR APPETITE OR OVEREATING: NOT AT ALL
SUM OF ALL RESPONSES TO PHQ QUESTIONS 1-9: 0

## 2019-04-08 NOTE — PATIENT INSTRUCTIONS
Take medrol dose moshe with food in the morning    Do not take ibuprofen while taking medrol dose moshe    Next time you have a gout flare try colchicine first then indocin if needed after that.  Do not take ibuprofen with indocin either.

## 2019-04-08 NOTE — LETTER
[facility]  [id number] [place] [id number]  Phone: [phone]    April 8, 2019        Zulay Rossi  [address] [facility] [address]          To whom it may concern:    RE: Zulay Rossi    Patient was seen and treated today at our clinic and missed work. She should be excused today for this.     Please contact me for questions or concerns.      Sincerely,        Dania Munoz PA-C

## 2019-04-08 NOTE — PROGRESS NOTES
SUBJECTIVE:                                                    Zulay Rossi is a 39 year old female who presents to clinic today for the following health issues:    Gout/ single inflamed joint   Onset: x 3-4 days    Description:   Location: foot - right  Joint Swelling: YES  Redness: YES  Pain: YES    Intensity: severe    Progression of Symptoms:  worsening    Accompanying Signs & Symptoms:  Fevers: no     History:   Trauma to the area: no   Previous history of gout: YES   Recent illness:  no     Precipitating factors:   Diet-rich in purine: no  Alcohol use: no   Diuretic use: no     Alleviating factors:  None    Therapies Tried and outcome: Tylenol, percocet and ice        Last gout episode was in sept.  Was previously on allopurinol had high uric acid.   Pain and redness and edema is right foot mostly on dorsal aspect of distal metatarsals. She has had it in this area before. Tender to touch. No fevers. Hurts to move. She is walking on crutches.     She has percocet for PRN pancreatitis flares. She was just in hospital recently and is out of medications. Will refill. We have pain contract. Recommend doing random urine at next visit. She sees me every 3 months for this at minimum. She saw pain clinic initially for these recommendations. We discussed side effects and risks of this medication today including addiction, tolerance, increased sedation, respiratory depression, and possibly overdose if not taken as directed.   They will not mix this medication with alcohol or other sedating medications. They will not drive after taking this medication.  Patient states understanding. They verbally agreed to use their medication responsibly.           Problem list and histories reviewed & adjusted, as indicated.  Additional history: as documented    Patient Active Problem List   Diagnosis     Mood disorder (H)     Headache     Goiter, non-toxic     Acute gouty arthritis     Elevated uric acid in blood     Ovarian cyst      S/P LEEP of cervix     Tobacco abuse     Acute bronchospasm     Benign essential hypertension     Controlled substance agreement signed     Chronic pain syndrome     Health Care Home     Idiopathic chronic pancreatitis (H)     Fatty infiltration of liver     Type 2 diabetes mellitus with hyperglycemia, with long-term current use of insulin (H)     Past Surgical History:   Procedure Laterality Date     BIOPSY  7/4/2013    Colonoscopy     COLONOSCOPY       HC VAGINAL DELIVERY, NO EPISIOTOMY/FORCEPS  11/2000     LEEP TX, CERVICAL  2003    MILI III     SOFT TISSUE SURGERY  5/2016    Lacerated tendon repair on right ring finger       Social History     Tobacco Use     Smoking status: Former Smoker     Packs/day: 0.50     Years: 15.00     Pack years: 7.50     Types: Cigarettes     Start date: 4/5/2018     Smokeless tobacco: Never Used   Substance Use Topics     Alcohol use: Yes     Comment: occ     Family History   Problem Relation Age of Onset     Unknown/Adopted Mother          Current Outpatient Medications   Medication Sig Dispense Refill     albuterol (2.5 MG/3ML) 0.083% neb solution Take 1 vial (2.5 mg) by nebulization every 4 hours as needed for shortness of breath / dyspnea or wheezing 60 vial 5     albuterol (PROAIR HFA/PROVENTIL HFA/VENTOLIN HFA) 108 (90 Base) MCG/ACT inhaler INHALE 1-2 PUFFS EVERY 4 HOURS AS NEEDED FOR SHORTNESS OF BREATH/DYSPNEA/WHEEZING 8.5 Inhaler 4     amitriptyline (ELAVIL) 25 MG tablet Take 1 tablet (25 mg) by mouth At Bedtime 30 tablet 11     amLODIPine (NORVASC) 10 MG tablet Take 1 tablet (10 mg) by mouth daily 90 tablet 1     atorvastatin (LIPITOR) 10 MG tablet TAKE 1 TABLET DAILY IN THE EVENING FOR CHOLESTEROL 90 tablet 3     blood glucose (NO BRAND SPECIFIED) lancets standard Use to test blood sugar 3 times daily or as directed. For Accu Chek or brand per insurance 300 each 1     blood glucose (NO BRAND SPECIFIED) test strip Use to test blood sugar 3 times daily or as directed.  verio one touch or brand per insurance. 300 each 1     blood glucose monitoring (NO BRAND SPECIFIED) meter device kit Use to test blood sugar 3 times daily or as directed.   For Accu Chek or brand per insurance 1 kit 0     colchicine (COLCYRS) 0.6 MG tablet Take 1 tablet at onset of gout flare.  May repeat 1 tablet after 4 hours if needed. 20 tablet 0     CREON 14011-36773 units CPEP per EC capsule TAKE 1 CAPSULE (24,000 UNITS) BY MOUTH 4 TIMES DAILY AS NEEDED TAKE WITH MEALS AND ONE SNACK 120 capsule 1     fenofibrate micronized (LOFIBRA) 200 MG capsule Take 1 capsule (200 mg) by mouth every morning (before breakfast) 90 capsule 1     indomethacin (INDOCIN) 50 MG capsule Take at onset of gout flare 1 capsule twice daily with meals until symptoms resolve. 60 capsule 0     insulin glargine (BASAGLAR KWIKPEN) 100 UNIT/ML pen INJECT 10 UNITS SUBCUTANEOUS AT BEDTIME 15 mL 2     insulin glargine (LANTUS SOLOSTAR PEN) 100 UNIT/ML pen Inject 16 Units Subcutaneous At Bedtime 3 mL 0     insulin pen needle (32G X 4 MM) 32G X 4 MM miscellaneous Use 1 pen needles daily or as directed. 100 each 3     losartan (COZAAR) 100 MG tablet Take 1 tablet (100 mg) by mouth daily 90 tablet 1     metFORMIN (GLUCOPHAGE-XR) 500 MG 24 hr tablet Take 1 tablet (500 mg) by mouth 2 times daily (with meals) 60 tablet 2     methylPREDNISolone (MEDROL DOSEPAK) 4 MG tablet therapy pack Follow Package Directions 21 tablet 0     metoclopramide (REGLAN) 10 MG tablet TAKE 1 TABLET (10 MG) BY MOUTH 2 TIMES DAILY AS NEEDED 60 tablet 3     montelukast (SINGULAIR) 10 MG tablet TAKE 1 TABLET (10 MG) BY MOUTH AT BEDTIME FOR COUGH/ALLERGIES 30 tablet 11     ondansetron (ZOFRAN) 8 MG tablet Take 1 tablet (8 mg) by mouth every 8 hours as needed for nausea 60 tablet 3     oxyCODONE-acetaminophen (PERCOCET) 5-325 MG tablet Take 1 tablet by mouth every 8 hours as needed for pain Maximum 4 tablet(s) per day 15 tablet 0     ranitidine (ZANTAC) 150 MG tablet Take  by  mouth daily.       sertraline (ZOLOFT) 100 MG tablet Take 1 tablet (100 mg) by mouth daily 90 tablet 3     tiZANidine (ZANAFLEX) 2 MG tablet TAKE 1 TABLET (2 MG) BY MOUTH 3 TIMES DAILY AS NEEDED FOR MUSCLE SPASMS /PAIN 60 tablet 1     No Known Allergies    ROS:  Constitutional, HEENT, cardiovascular, pulmonary, GI, , musculoskeletal, neuro, skin, endocrine and psych systems are negative, except as otherwise noted.    OBJECTIVE:     /88   Pulse 106   Temp 98  F (36.7  C) (Oral)   Resp 14   Wt 58.5 kg (129 lb)   SpO2 98%   Breastfeeding? No   BMI 23.59 kg/m    Body mass index is 23.59 kg/m .  GENERAL: healthy, alert and no distress  NECK: no adenopathy, no asymmetry, masses, or scars and thyroid normal to palpation  RESP: lungs clear to auscultation - no rales, rhonchi or wheezes  CV: regular rate and rhythm, normal S1 S2, no S3 or S4, no murmur, click or rub, no peripheral edema and peripheral pulses strong  MS: {:R foot swollen warm and tender over distal metatarsals dorsal aspect. Toes are normal with normal range of motion.   NEURO: Normal strength and tone, mentation intact and speech normal  PSYCH: mentation appears normal, affect normal/bright        ASSESSMENT/PLAN:     ASSESSMENT / PLAN:  (M10.9) Acute gouty arthritis  (primary encounter diagnosis)  Comment:  See below  Plan: colchicine (COLCYRS) 0.6 MG tablet,         indomethacin (INDOCIN) 50 MG capsule,         methylPREDNISolone (MEDROL DOSEPAK) 4 MG tablet        therapy pack          Consider allopurinol if these become more frequent  Cause was likely dehydration before going into hospital recently she was vomiting from pancreatitis sudha n    (K86.1) Chronic pancreatitis, unspecified pancreatitis type (H)  Comment:   Plan: oxyCODONE-acetaminophen (PERCOCET) 5-325 MG         tablet          recheck 3 months sooner if needed    Patient Instructions   Take medrol dose moshe with food in the morning    Do not take ibuprofen while taking medrol  dose moshe    Next time you have a gout flare try colchicine first then indocin if needed after that.  Do not take ibuprofen with indocin either.       Dania Munoz PA-C  [facility]

## 2019-04-09 DIAGNOSIS — R05.9 COUGH: ICD-10-CM

## 2019-04-09 DIAGNOSIS — Z88.9 MULTIPLE ALLERGIES: ICD-10-CM

## 2019-04-09 ASSESSMENT — ANXIETY QUESTIONNAIRES: GAD7 TOTAL SCORE: 0

## 2019-04-09 NOTE — TELEPHONE ENCOUNTER
Request to close potential gap in therapy.We spoke to your patient  About asthma care and noticed your patient previously received daily controller therapy but has not filled it at [facility] pharmacy in the last 180 days.Please send a new prescription if it is appropriate.

## 2019-04-10 ENCOUNTER — TELEPHONE (OUTPATIENT)
Dept: FAMILY MEDICINE | Facility: CLINIC | Age: 39
End: 2019-04-10

## 2019-04-10 RX ORDER — MONTELUKAST SODIUM 10 MG/1
TABLET ORAL
Qty: 30 TABLET | Refills: 11 | OUTPATIENT
Start: 2019-04-10

## 2019-04-10 NOTE — TELEPHONE ENCOUNTER
Drug: Montelukast Sod 10mg tab  Dear Prescriber, we spoke to your patient about previously received daily controller therapy but has not filled at a [facility] pharmacy in the last 180 days.   Your patient would like us to reach out on their behalf to determine if it is appropriate to restart the daily controller therapy. Please send a new script for daily controller therapy if it is appropriate.    Pharmacy comments: Would patient benefit from this therapy.

## 2019-04-10 NOTE — TELEPHONE ENCOUNTER
See previous message.  Pharmacy notified they should fill prescription they have available.  There is no note in chart saying pt should stop.  To provider to cosign.  Barbara STEELEN, RN

## 2019-04-19 ENCOUNTER — MYC MEDICAL ADVICE (OUTPATIENT)
Dept: FAMILY MEDICINE | Facility: CLINIC | Age: 39
End: 2019-04-19

## 2019-04-30 DIAGNOSIS — E11.65 TYPE 2 DIABETES MELLITUS WITH HYPERGLYCEMIA, WITH LONG-TERM CURRENT USE OF INSULIN (H): ICD-10-CM

## 2019-04-30 DIAGNOSIS — Z79.4 TYPE 2 DIABETES MELLITUS WITH HYPERGLYCEMIA, WITH LONG-TERM CURRENT USE OF INSULIN (H): ICD-10-CM

## 2019-05-01 ENCOUNTER — TRANSFERRED RECORDS (OUTPATIENT)
Dept: HEALTH INFORMATION MANAGEMENT | Facility: CLINIC | Age: 39
End: 2019-05-01

## 2019-05-01 RX ORDER — METFORMIN HCL 500 MG
500 TABLET, EXTENDED RELEASE 24 HR ORAL 2 TIMES DAILY WITH MEALS
Qty: 60 TABLET | Refills: 1 | Status: SHIPPED | OUTPATIENT
Start: 2019-05-01 | End: 2019-06-27

## 2019-05-01 NOTE — TELEPHONE ENCOUNTER
Routing refill request to provider for review/approval because:          Amina STEELEN, RN, CPN

## 2019-06-06 DIAGNOSIS — Z79.4 TYPE 2 DIABETES MELLITUS WITH HYPERGLYCEMIA, WITH LONG-TERM CURRENT USE OF INSULIN (H): ICD-10-CM

## 2019-06-06 DIAGNOSIS — E11.65 TYPE 2 DIABETES MELLITUS WITH HYPERGLYCEMIA, WITH LONG-TERM CURRENT USE OF INSULIN (H): ICD-10-CM

## 2019-06-07 RX ORDER — METFORMIN HCL 500 MG
500 TABLET, EXTENDED RELEASE 24 HR ORAL 2 TIMES DAILY WITH MEALS
Qty: 60 TABLET | Refills: 0 | OUTPATIENT
Start: 2019-06-07

## 2019-06-10 ENCOUNTER — TELEPHONE (OUTPATIENT)
Dept: FAMILY MEDICINE | Facility: CLINIC | Age: 39
End: 2019-06-10

## 2019-06-19 ENCOUNTER — MYC REFILL (OUTPATIENT)
Dept: FAMILY MEDICINE | Facility: CLINIC | Age: 39
End: 2019-06-19

## 2019-06-19 ENCOUNTER — TRANSFERRED RECORDS (OUTPATIENT)
Dept: HEALTH INFORMATION MANAGEMENT | Facility: CLINIC | Age: 39
End: 2019-06-19

## 2019-06-19 DIAGNOSIS — K86.1 CHRONIC PANCREATITIS, UNSPECIFIED PANCREATITIS TYPE (H): ICD-10-CM

## 2019-06-19 NOTE — TELEPHONE ENCOUNTER
Controlled Substance Refill Request for percocet  Problem List Complete:  Yes  Patient is followed by Dania Munoz PA-C for ongoing prescription of pain medication.  All refills should only be approved by this provider, or covering partner.    Medication(s): percocet 5 mg tabs.  Takes 1-2 every 4 hours after pancreatitis attack only.  Does not take daily every month. Takes after acute episodes of pancreatitis which are becoming more frequent.   Maximum quantity per month: 12 per month per pain clinic recommendations and only after a flare in hospital  Clinic visit frequency required: Q 3 months     Controlled substance agreement:  Encounter-Level CSA:     There are no encounter-level csa.        Pain Clinic evaluation in the past: No    DIRE Total Score(s):  No flowsheet data found.    Last [facility] website verification:  done on 4-18-17   [email or web address]       checked in past 3 months?  Yes 6/19/19 on your desk for review.   Barbara STEELEN, RN

## 2019-06-20 LAB
ALT SERPL-CCNC: 70 IU/L (ref 8–45)
AST SERPL-CCNC: 187 IU/L (ref 2–40)

## 2019-06-20 RX ORDER — OXYCODONE AND ACETAMINOPHEN 5; 325 MG/1; MG/1
1 TABLET ORAL EVERY 8 HOURS PRN
Qty: 15 TABLET | Refills: 0 | Status: SHIPPED | OUTPATIENT
Start: 2019-06-20 | End: 2019-07-05

## 2019-06-25 ENCOUNTER — TRANSFERRED RECORDS (OUTPATIENT)
Dept: HEALTH INFORMATION MANAGEMENT | Facility: CLINIC | Age: 39
End: 2019-06-25

## 2019-06-25 LAB
CHOLEST SERPL-MCNC: 177 MG/DL (ref 100–199)
HDLC SERPL-MCNC: 73 MG/DL
LDLC SERPL CALC-MCNC: 73 MG/DL
NONHDLC SERPL-MCNC: 104 MG/DL
TRIGL SERPL-MCNC: 157 MG/DL
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
## 2019-06-27 ENCOUNTER — TELEPHONE (OUTPATIENT)
Dept: FAMILY MEDICINE | Facility: CLINIC | Age: 39
End: 2019-06-27

## 2019-06-27 ENCOUNTER — OFFICE VISIT (OUTPATIENT)
Dept: FAMILY MEDICINE | Facility: CLINIC | Age: 39
End: 2019-06-27
Payer: COMMERCIAL

## 2019-06-27 VITALS
WEIGHT: 114 LBS | SYSTOLIC BLOOD PRESSURE: 169 MMHG | DIASTOLIC BLOOD PRESSURE: 128 MMHG | RESPIRATION RATE: 16 BRPM | BODY MASS INDEX: 20.85 KG/M2 | HEART RATE: 94 BPM | TEMPERATURE: 98 F | OXYGEN SATURATION: 98 %

## 2019-06-27 DIAGNOSIS — I10 BENIGN ESSENTIAL HYPERTENSION: ICD-10-CM

## 2019-06-27 DIAGNOSIS — Z79.4 TYPE 2 DIABETES MELLITUS WITH HYPERGLYCEMIA, WITH LONG-TERM CURRENT USE OF INSULIN (H): Primary | ICD-10-CM

## 2019-06-27 DIAGNOSIS — E11.65 TYPE 2 DIABETES MELLITUS WITH HYPERGLYCEMIA, WITH LONG-TERM CURRENT USE OF INSULIN (H): Primary | ICD-10-CM

## 2019-06-27 DIAGNOSIS — K86.1 CHRONIC PANCREATITIS, UNSPECIFIED PANCREATITIS TYPE (H): ICD-10-CM

## 2019-06-27 PROCEDURE — 99214 OFFICE O/P EST MOD 30 MIN: CPT | Performed by: PHYSICIAN ASSISTANT

## 2019-06-27 RX ORDER — METOPROLOL SUCCINATE 25 MG/1
25 TABLET, EXTENDED RELEASE ORAL DAILY
Qty: 30 TABLET | Refills: 0 | Status: SHIPPED | OUTPATIENT
Start: 2019-06-27 | End: 2019-07-20

## 2019-06-27 NOTE — TELEPHONE ENCOUNTER
Diabetes Education Scheduling Outreach #1:    Call to patient to schedule. Left message with phone number to call to schedule.    Plan for 2nd outreach attempt within 1 week.    Barbara Huynh  [place] [facility]  Diabetes and Nutrition Scheduling

## 2019-07-02 ENCOUNTER — TRANSFERRED RECORDS (OUTPATIENT)
Dept: HEALTH INFORMATION MANAGEMENT | Facility: CLINIC | Age: 39
End: 2019-07-02

## 2019-07-02 LAB
CREAT SERPL-MCNC: 0.63 MG/DL (ref 0.57–1.11)
GFR SERPL CREATININE-BSD FRML MDRD: >60 ML/MIN/1.73M2
GLUCOSE SERPL-MCNC: 193 MG/DL (ref 65–100)
HBA1C MFR BLD: 8.8 % (ref 0–5.7)

## 2019-07-03 LAB — POTASSIUM SERPL-SCNC: 4 MMOL/L (ref 3.5–5)

## 2019-07-05 ENCOUNTER — OFFICE VISIT (OUTPATIENT)
Dept: FAMILY MEDICINE | Facility: CLINIC | Age: 39
End: 2019-07-05
Payer: COMMERCIAL

## 2019-07-05 ENCOUNTER — TELEPHONE (OUTPATIENT)
Dept: FAMILY MEDICINE | Facility: CLINIC | Age: 39
End: 2019-07-05

## 2019-07-05 VITALS
TEMPERATURE: 98.2 F | DIASTOLIC BLOOD PRESSURE: 102 MMHG | HEART RATE: 103 BPM | SYSTOLIC BLOOD PRESSURE: 146 MMHG | BODY MASS INDEX: 20.94 KG/M2 | WEIGHT: 113.8 LBS | RESPIRATION RATE: 16 BRPM | OXYGEN SATURATION: 100 % | HEIGHT: 62 IN

## 2019-07-05 DIAGNOSIS — Z79.4 TYPE 2 DIABETES MELLITUS WITH HYPERGLYCEMIA, WITH LONG-TERM CURRENT USE OF INSULIN (H): ICD-10-CM

## 2019-07-05 DIAGNOSIS — E11.65 TYPE 2 DIABETES MELLITUS WITH HYPERGLYCEMIA, WITH LONG-TERM CURRENT USE OF INSULIN (H): ICD-10-CM

## 2019-07-05 DIAGNOSIS — I10 BENIGN ESSENTIAL HYPERTENSION: ICD-10-CM

## 2019-07-05 DIAGNOSIS — Z51.89 VISIT FOR WOUND CHECK: ICD-10-CM

## 2019-07-05 DIAGNOSIS — S91.209S AVULSION OF TOENAIL, SEQUELA: Primary | ICD-10-CM

## 2019-07-05 DIAGNOSIS — K86.1 CHRONIC PANCREATITIS, UNSPECIFIED PANCREATITIS TYPE (H): ICD-10-CM

## 2019-07-05 PROCEDURE — 99213 OFFICE O/P EST LOW 20 MIN: CPT | Performed by: PHYSICIAN ASSISTANT

## 2019-07-05 RX ORDER — OXYCODONE AND ACETAMINOPHEN 5; 325 MG/1; MG/1
1 TABLET ORAL EVERY 8 HOURS PRN
Qty: 15 TABLET | Refills: 0 | Status: SHIPPED | OUTPATIENT
Start: 2019-07-05 | End: 2019-08-13

## 2019-07-05 RX ORDER — CEPHALEXIN 500 MG/1
500 CAPSULE ORAL
COMMUNITY
Start: 2019-07-03 | End: 2019-07-16

## 2019-07-05 ASSESSMENT — MIFFLIN-ST. JEOR: SCORE: 1149.44

## 2019-07-05 NOTE — TELEPHONE ENCOUNTER
Ok due to ripping off nail and seen in emergency room.   F/u before more pain medications.       chi
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
## 2019-07-08 NOTE — PROGRESS NOTES
Subjective     Zulay Rossi is a 39 year old female who presents to clinic today for the following health issues:    HPI     Hypertension Follow-up      Do you check your blood pressure regularly outside of the clinic? Yes     Are you following a low salt diet? Yes    Are your blood pressures ever more than 140 on the top number (systolic) OR more   than 90 on the bottom number (diastolic), for example 140/90? Yes       Amount of exercise or physical activity: 2-3 days/week for an average of 15-30 minutes    Problems taking medications regularly: No    Medication side effects: none    Diet: regular (no restrictions)    Blood pressure has come down nicely. Was high at her specialist appointments. Still high at home sometimes. She needs a new cuff, will buy and then bring in next time.     No chest pain, shortness of breath, edema, PND, or orthopnea. No dizziness or vision changes. No side effects from medications.     Recent bout of pancreatitis-not needing pain medications right now. Has contract with me and has been compliant. High TGs-saw lipid doctor and they increased statin to help.     Diabetes-will be seeing endocrine soon.         Patient Active Problem List   Diagnosis     Mood disorder (H)     Headache     Goiter, non-toxic     Acute gouty arthritis     Elevated uric acid in blood     Ovarian cyst     S/P LEEP of cervix     Tobacco abuse     Acute bronchospasm     Benign essential hypertension     Controlled substance agreement signed     Chronic pain syndrome     Health Care Home     Chronic pancreatitis, unspecified pancreatitis type (H)     Idiopathic chronic pancreatitis (H)     Fatty infiltration of liver     Type 2 diabetes mellitus with hyperglycemia, with long-term current use of insulin (H)     Past Surgical History:   Procedure Laterality Date     BIOPSY  7/4/2013    Colonoscopy     COLONOSCOPY       HC VAGINAL DELIVERY, NO EPISIOTOMY/FORCEPS  11/2000     LEEP TX, CERVICAL  2003    MILI III      SOFT TISSUE SURGERY  5/2016    Lacerated tendon repair on right ring finger       Social History     Tobacco Use     Smoking status: Former Smoker     Packs/day: 0.50     Years: 15.00     Pack years: 7.50     Types: Cigarettes     Start date: 4/5/2018     Smokeless tobacco: Never Used   Substance Use Topics     Alcohol use: Yes     Comment: occ     Family History   Problem Relation Age of Onset     Unknown/Adopted Mother          Current Outpatient Medications   Medication Sig Dispense Refill     albuterol (2.5 MG/3ML) 0.083% neb solution Take 1 vial (2.5 mg) by nebulization every 4 hours as needed for shortness of breath / dyspnea or wheezing 60 vial 5     albuterol (PROAIR HFA/PROVENTIL HFA/VENTOLIN HFA) 108 (90 Base) MCG/ACT inhaler INHALE 1-2 PUFFS EVERY 4 HOURS AS NEEDED FOR SHORTNESS OF BREATH/DYSPNEA/WHEEZING 8.5 Inhaler 4     amitriptyline (ELAVIL) 25 MG tablet Take 1 tablet (25 mg) by mouth At Bedtime 30 tablet 11     amLODIPine (NORVASC) 10 MG tablet Take 1 tablet (10 mg) by mouth daily 90 tablet 1     atorvastatin (LIPITOR) 10 MG tablet TAKE 1 TABLET DAILY IN THE EVENING FOR CHOLESTEROL 90 tablet 3     blood glucose (NO BRAND SPECIFIED) lancets standard Use to test blood sugar 3 times daily or as directed. For Accu Chek or brand per insurance 300 each 1     blood glucose (NO BRAND SPECIFIED) test strip Use to test blood sugar 3 times daily or as directed. verio one touch or brand per insurance. 300 each 1     blood glucose monitoring (NO BRAND SPECIFIED) meter device kit Use to test blood sugar 3 times daily or as directed.   For Accu Chek or brand per insurance 1 kit 0     colchicine (COLCYRS) 0.6 MG tablet Take 1 tablet at onset of gout flare.  May repeat 1 tablet after 4 hours if needed. 20 tablet 0     CREON 27259-47064 units CPEP per EC capsule TAKE 1 CAPSULE (24,000 UNITS) BY MOUTH 4 TIMES DAILY AS NEEDED TAKE WITH MEALS AND ONE SNACK 120 capsule 1     fenofibrate micronized (LOFIBRA) 200 MG capsule  Take 1 capsule (200 mg) by mouth every morning (before breakfast) 90 capsule 1     indomethacin (INDOCIN) 50 MG capsule Take at onset of gout flare 1 capsule twice daily with meals until symptoms resolve. 60 capsule 0     insulin glargine (BASAGLAR KWIKPEN) 100 UNIT/ML pen INJECT 10 UNITS SUBCUTANEOUS AT BEDTIME 15 mL 2     insulin pen needle (32G X 4 MM) 32G X 4 MM miscellaneous Use 1 pen needles daily or as directed. 100 each 3     losartan (COZAAR) 100 MG tablet Take 1 tablet (100 mg) by mouth daily 90 tablet 1     metoclopramide (REGLAN) 10 MG tablet TAKE 1 TABLET (10 MG) BY MOUTH 2 TIMES DAILY AS NEEDED 60 tablet 3     metoprolol succinate ER (TOPROL-XL) 25 MG 24 hr tablet Take 1 tablet (25 mg) by mouth daily 30 tablet 0     montelukast (SINGULAIR) 10 MG tablet TAKE 1 TABLET (10 MG) BY MOUTH AT BEDTIME FOR COUGH/ALLERGIES 30 tablet 11     ondansetron (ZOFRAN) 8 MG tablet Take 1 tablet (8 mg) by mouth every 8 hours as needed for nausea 60 tablet 3     oxyCODONE-acetaminophen (PERCOCET) 5-325 MG tablet Take 1 tablet by mouth every 8 hours as needed for pain Maximum 4 tablet(s) per day 15 tablet 0     ranitidine (ZANTAC) 150 MG tablet Take  by mouth daily.       sertraline (ZOLOFT) 100 MG tablet Take 1 tablet (100 mg) by mouth daily 90 tablet 3     tiZANidine (ZANAFLEX) 2 MG tablet TAKE 1 TABLET (2 MG) BY MOUTH 3 TIMES DAILY AS NEEDED FOR MUSCLE SPASMS /PAIN 60 tablet 1     No Known Allergies      Reviewed and updated as needed this visit by Provider         Review of Systems   ROS COMP: Constitutional, HEENT, cardiovascular, pulmonary, GI, , musculoskeletal, neuro, skin, endocrine and psych systems are negative, except as otherwise noted.      Objective    /72   Pulse 84   Temp 98.5  F (36.9  C) (Oral)   Resp 14   Wt 52.6 kg (116 lb)   SpO2 97%   Breastfeeding? No   BMI 21.22 kg/m    Body mass index is 21.22 kg/m .  Physical Exam   GENERAL: healthy, alert and no distress  RESP: lungs clear to  auscultation - no rales, rhonchi or wheezes  CV: regular rate and rhythm, normal S1 S2, no S3 or S4, no murmur, click or rub, no peripheral edema and peripheral pulses strong  MS: no gross musculoskeletal defects noted, no edema  PSYCH: mentation appears normal, affect normal/bright    Diagnostic Test Results:  Labs reviewed in Epic        Assessment & Plan       ASSESSMENT / PLAN:  (I10) Benign essential hypertension  (primary encounter diagnosis)  Comment: improved, continue medications recheck 3 months  Plan:     (R11.0) Nausea  Comment:   Plan: ondansetron (ZOFRAN) 8 MG tablet        Stab le    (K86.1) Chronic pancreatitis, unspecified pancreatitis type (H)  Comment: continue with lipid specialist and gi  Plan: stable for now      Billin min spent face-to-face with patient. 15 min on history, 5 on exam, 5 on discussing diagnosis and treatment plan.     Dania Munoz PA-C  [facility]

## 2019-07-10 NOTE — TELEPHONE ENCOUNTER
Diabetes Education Scheduling Outreach #2:    Call to patient to schedule. Left message with phone number to call to schedule.    .    Yan Stubbs  [place] [facility]  Diabetes and Nutrition Scheduling

## 2019-07-16 ENCOUNTER — OFFICE VISIT (OUTPATIENT)
Dept: FAMILY MEDICINE | Facility: CLINIC | Age: 39
End: 2019-07-16
Payer: COMMERCIAL

## 2019-07-16 VITALS
TEMPERATURE: 98.5 F | WEIGHT: 116 LBS | RESPIRATION RATE: 14 BRPM | HEART RATE: 84 BPM | SYSTOLIC BLOOD PRESSURE: 102 MMHG | BODY MASS INDEX: 21.22 KG/M2 | OXYGEN SATURATION: 97 % | DIASTOLIC BLOOD PRESSURE: 72 MMHG

## 2019-07-16 DIAGNOSIS — I10 BENIGN ESSENTIAL HYPERTENSION: Primary | ICD-10-CM

## 2019-07-16 DIAGNOSIS — K86.1 CHRONIC PANCREATITIS, UNSPECIFIED PANCREATITIS TYPE (H): ICD-10-CM

## 2019-07-16 DIAGNOSIS — R11.0 NAUSEA: ICD-10-CM

## 2019-07-16 PROCEDURE — 99214 OFFICE O/P EST MOD 30 MIN: CPT | Performed by: PHYSICIAN ASSISTANT

## 2019-07-16 RX ORDER — ONDANSETRON 8 MG/1
8 TABLET, FILM COATED ORAL EVERY 8 HOURS PRN
Qty: 60 TABLET | Refills: 3 | Status: SHIPPED | OUTPATIENT
Start: 2019-07-16 | End: 2020-03-24

## 2019-07-20 DIAGNOSIS — I10 BENIGN ESSENTIAL HYPERTENSION: ICD-10-CM

## 2019-07-22 RX ORDER — METOPROLOL SUCCINATE 25 MG/1
TABLET, EXTENDED RELEASE ORAL
Qty: 90 TABLET | Refills: 0 | Status: SHIPPED | OUTPATIENT
Start: 2019-07-22 | End: 2020-06-18

## 2019-07-23 ENCOUNTER — E-VISIT (OUTPATIENT)
Dept: FAMILY MEDICINE | Facility: CLINIC | Age: 39
End: 2019-07-23
Payer: COMMERCIAL

## 2019-07-23 DIAGNOSIS — K85.90 ACUTE PANCREATITIS, UNSPECIFIED COMPLICATION STATUS, UNSPECIFIED PANCREATITIS TYPE: Primary | ICD-10-CM

## 2019-07-23 PROCEDURE — 99444 ZZC PHYSICIAN ONLINE EVALUATION & MANAGEMENT SERVICE: CPT | Performed by: PHYSICIAN ASSISTANT

## 2019-07-23 NOTE — LETTER
[facility]  [address] [place] [facility] [address]  Phone: [phone]    July 23, 2019        Zulay Rossi  [address]  [facility] [address]          To whom it may concern:    RE: Zulay Rossi    Patient has been ill with acute illness. She should be excused yesterday and today and may return tomorrow if feeling better.     Please contact me for questions or concerns.      Sincerely,        Dania Munoz PA-C

## 2019-08-13 ENCOUNTER — MYC REFILL (OUTPATIENT)
Dept: FAMILY MEDICINE | Facility: CLINIC | Age: 39
End: 2019-08-13

## 2019-08-13 DIAGNOSIS — R10.10 UPPER ABDOMINAL PAIN: ICD-10-CM

## 2019-08-13 DIAGNOSIS — K86.1 CHRONIC PANCREATITIS, UNSPECIFIED PANCREATITIS TYPE (H): ICD-10-CM

## 2019-08-13 RX ORDER — METOCLOPRAMIDE 10 MG/1
TABLET ORAL
Qty: 60 TABLET | Refills: 3 | Status: SHIPPED | OUTPATIENT
Start: 2019-08-13 | End: 2020-03-24

## 2019-08-13 RX ORDER — OXYCODONE AND ACETAMINOPHEN 5; 325 MG/1; MG/1
1 TABLET ORAL EVERY 8 HOURS PRN
Qty: 15 TABLET | Refills: 0 | Status: SHIPPED | OUTPATIENT
Start: 2019-08-13 | End: 2019-09-30

## 2019-08-13 NOTE — TELEPHONE ENCOUNTER
Routing refill request to provider for review/approval because:  She was just given Zofran 7/16/19 for nausea.  Did you want her to continue on the above medication also?  If so please send medication.  Thank you. Tiara Mann R.N.

## 2019-08-13 NOTE — TELEPHONE ENCOUNTER
Controlled Substance Refill Request for Percocet  Problem List Complete:  Yes    Patient is followed by Dania Munoz PA-C for ongoing prescription of pain medication.  All refills should only be approved by this provider, or covering partner.     Medication(s): percocet 5 mg tabs.  Takes 1-2 every 4 hours after pancreatitis attack only.  Does not take daily every month. Takes after acute episodes of pancreatitis which are becoming more frequent.   Maximum quantity per month: 12 per month per pain clinic recommendations and only after a flare in hospital  Clinic visit frequency required: Q 3 months      Controlled substance agreement:      Encounter-Level CSA:      There are no encounter-level csa.          Pain Clinic evaluation in the past: No     DIRE Total Score(s):  No flowsheet data found.     checked in past 3 months?  Yes 8/13/19, no concerns       Amina STEELEN, RN, CPN

## 2019-08-14 DIAGNOSIS — K86.1 CHRONIC PANCREATITIS, UNSPECIFIED PANCREATITIS TYPE (H): ICD-10-CM

## 2019-08-14 DIAGNOSIS — G89.4 CHRONIC PAIN SYNDROME: ICD-10-CM

## 2019-08-14 RX ORDER — TIZANIDINE 2 MG/1
2 TABLET ORAL 3 TIMES DAILY PRN
Qty: 60 TABLET | Refills: 1 | Status: SHIPPED | OUTPATIENT
Start: 2019-08-14 | End: 2020-03-24

## 2019-09-05 ENCOUNTER — ALLIED HEALTH/NURSE VISIT (OUTPATIENT)
Dept: EDUCATION SERVICES | Facility: CLINIC | Age: 39
End: 2019-09-05
Payer: COMMERCIAL

## 2019-09-05 VITALS — BODY MASS INDEX: 20.3 KG/M2 | WEIGHT: 111 LBS

## 2019-09-05 DIAGNOSIS — Z79.4 TYPE 2 DIABETES MELLITUS WITH HYPERGLYCEMIA, WITH LONG-TERM CURRENT USE OF INSULIN (H): Primary | ICD-10-CM

## 2019-09-05 DIAGNOSIS — E11.65 TYPE 2 DIABETES MELLITUS WITH HYPERGLYCEMIA, WITH LONG-TERM CURRENT USE OF INSULIN (H): Primary | ICD-10-CM

## 2019-09-05 PROCEDURE — G0108 DIAB MANAGE TRN  PER INDIV: HCPCS

## 2019-09-05 NOTE — Clinical Note
Novolog will be needed, but I want to see what her after meal blood sugars are doing before recommending a dose.  Will route orders when she sends in after 1 week.Angelic Soliman MS, RD, LD, CDE

## 2019-09-05 NOTE — PATIENT INSTRUCTIONS
Check blood sugars before and 2 hours after meals for 1 week  -track foods with portion sizes of carbohydrates if possible    1Ring OR [employer] connect rosa- then export logbook- and send to email    After 1 week of checking and tracking send us an E-mail or [employer] message with the information and we'll calculate what to do with basaglar and a mealtime insulin.    Aiming for  fasting or before meals  2 hours after eating less than 180      [place] [facility] for the [facility]:  For Your Diabetes Education and Nutrition Appointments Call:  [phone]   For Diabetes Education or Nutrition Related Questions:   Phone: [phone]  E-mail: [email or web address]  Fax: [phone]   If you need a medication refill please contact your pharmacy. Please allow 3 business days for your refills to be completed.    Instructions for emailing the Diabetes Educators    If you need to communicate a non-urgent message to a Diabetes Educator via email, please send to [email or web address].    Please follow the following email guidelines:    Subject line: Secure: your clinic name (example: Secure: [facility])  In the email please include: First name, middle initial, last name and date of birth.    We will be in touch with you within one (1) business day.

## 2019-09-05 NOTE — PROGRESS NOTES
"Diabetes Self-Management Education & Support    Diabetes Education Self Management & Training    SUBJECTIVE/OBJECTIVE:  Presents for: Initial Assessment for new diagnosis  Accompanied by: Self  Diabetes education in the past 24mo: No(Did have some education in the hospital)  Focus of Visit: Monitoring, Taking Medication  Diabetes type: Type 1, Secondary Diabetes(Pancreatitis)  Disease course: Worsening  How confident are you filling out medical forms by yourself:: Extremely  Diabetes management related comments/concerns: I would like to gain weight.  Transportation concerns: No  Other concerns:: None  Cultural Influences/Ethnic Background:  American    Family history- patient is adopted and does not know family history    Diabetes Symptoms & Complications  Blurred vision: No  Fatigue: No  Neuropathy: No  Foot ulcerations: No  Polydipsia: No  Polyphagia: No  Polyuria: No  Visual change: No  Weakness: No  Weight loss: Yes  Slow healing wounds: No  Recent Infection(s): Yes  Symptom course: Worsening  Weight trend: Decreasing rapidly  Autonomic neuropathy: No  CVA: No  Heart disease: No  Nephropathy: No  Peripheral neuropathy: No  Peripheral Vascular Disease: No  Retinopathy: No  Sexual dysfunction: No    Patient Problem List and Family Medical History reviewed for relevant medical history, current medical status, and diabetes risk factors.    Vitals:  Wt 50.3 kg (111 lb)   BMI 20.30 kg/m    Estimated body mass index is 20.3 kg/m  as calculated from the following:    Height as of 7/5/19: 1.575 m (5' 2\").    Weight as of this encounter: 50.3 kg (111 lb).     Wt Readings from Last 10 Encounters:   09/05/19 50.3 kg (111 lb)   07/16/19 52.6 kg (116 lb)   07/05/19 51.6 kg (113 lb 12.8 oz)   06/27/19 51.7 kg (114 lb)   04/08/19 58.5 kg (129 lb)   03/07/19 58.5 kg (129 lb)   01/03/19 57.6 kg (127 lb)   09/21/18 57.2 kg (126 lb)   09/05/18 62.6 kg (138 lb)   08/27/18 62.6 kg (138 lb)       Last 3 BP:   BP Readings from Last 3 " Encounters:   19 102/72   19 (!) 146/102   19 (!) (P) 154/96       History   Smoking Status     Former Smoker     Packs/day: 0.50     Years: 15.00     Types: Cigarettes     Start date: 2018   Smokeless Tobacco     Never Used       Labs:  Lab Results   Component Value Date    A1C 8.8 2019     Lab Results   Component Value Date     2019     Lab Results   Component Value Date    LDL 73 2019     HDL Cholesterol   Date Value Ref Range Status   2019 73 >40 mg/dL Final   ]  GFR Estimate   Date Value Ref Range Status   2019 >60 >60 ml/min/1.73m2 Final     GFR Estimate If Black   Date Value Ref Range Status   2019 >60 >60 ml/min/1.73m2 Final     Lab Results   Component Value Date    CR 0.63 2019     No results found for: MICROALBUMIN    Healthy Eating  Healthy Eating Assessed Today: Yes  Cultural/[facility] diet restrictions?: No  Patient on a regular basis: Eats 3 meals a day  Meal planning: None  Meals include: Breakfast, Lunch, Dinner, Snacks  Beverages: Water, Milk(fair life milk)  Has patient met with a dietitian in the past?: No        Low appetite- decreased in last year  -  wrap    Being Active  Being Active Assessed Today: Yes  Exercise:: Yes  Days per week of moderate to strenuous exercise (like a brisk walk): 5  On average, minutes per day of exercise at this level: 120  How intense was your typical exercise? 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 : Yes  Blood Glucose Meter: Accu-check([employer])  Home Glucose (Sugar) Monitorin-2 times per day  Blood glucose trend: Fluctuating dramtically  Low Glucose Range (mg/dL):   High Glucose Range (mg/dL): >200  Overall Range (mg/dL):  130-140        Taking Medications  Diabetes Medication(s)     Insulin       insulin glargine (BASAGLAR KWIKPEN) 100 UNIT/ML pen    INJECT 10 UNITS SUBCUTANEOUS AT BEDTIME          Taking Medication Assessed Today: Yes  Current Treatments: Insulin Injections  Dose schedule: at bedtime  Given by: Patient  Injection/Infusion sites: Abdomen  Problems taking diabetes medications regularly?: No  Diabetes medication side effects?: No  Treatment Compliance: All of the time    Problem Solving  Problem Solving Assessed Today: Yes  Hypoglycemia Frequency: Rarely  Hypoglycemia Treatment: Candy  Patient carries a carbohydrate source: Yes  Medical alert: No  Severe weather/disaster plan for diabetes management?: No  DKA prevention plan?: No    Under 100 feels shakey- usually 1 time per month    Hypoglycemia symptoms  Dizziness or Light-Headedness: Yes    Hypoglycemia Complications  Blackouts: No  Hospitalization: No  Nocturnal hypoglycemia: (unknown)  Required assistance: No  Required glucagon injection: No  Seizures: No    Reducing Risks  Reducing Risks Assessed Today: Yes  Diabetes Risks: None  CAD Risks: Diabetes Mellitus    Healthy Coping  Healthy Coping Assessed Today: Yes  Emotional response to diabetes: Ready to learn, Concern for health and well-being  Informal Support system:: Friends  Stage of change: PREPARATION (Decided to change - considering how)  Difficulty affording diabetes management supplies?: No  Support resources: None  Patient Activation Measure Survey Score:  ALEJANDRA Score (Last Two) 6/7/2011 1/3/2019   ALEJANDRA Raw Score 52 40   Activation Score 100 100   ALEJANDRA Level 4 4       ASSESSMENT:  Patients blood sugars are inconsistently elevated, but it is difficult to completely assess given most blood sugar checks are fasting, before eating, or before bed.  C-peptide was checked 1/2019 and was 3.1 indicating at that time her pancrease was producing insulin.  No MAURA antibodies have been checked so it is unclear if diabetes  is completely pancreatitis related OR autoimmune and what direction her insulin making abilities might go.  At her current weight her Basaglar dose is 0.19 units/kg, but her insulin sensititivity is likely high given her low weight.  Her weight loss of 19 lbs in the past 5 months (15%), and 5 lbs in past 6 weeks (4%) would be considered severe weight loss.  This indicated possibly since April her pancrease has decreased insulin production leading to more consistently elevated blood sugars.  Additionally she complains of a low appetite and stomach ache.  She would like to gain weight and stop the unplanned weight loss, with a goal weight of 120 lbs.  If patient starts mealtime insulin anticipate weight re-gain will naturally occur, but will offer more specific calorie and protein targets if she does not gain weight.    Recommend patient start mealtime insulin, but given post meal blood sugars are not yet known recommend 1 week of testing 6 times daily. Patient reports reading nutrition facts labels and would benefit from likely using an insulin to carbohydrate ratio vs. Set doses to allow for adjustment and prevention of lows.     Appears Novolog is preferred rapid acting insulin by insurance.    Patient's most recent   Lab Results   Component Value Date    A1C 8.8 07/02/2019    is not meeting goal of <7.0    INTERVENTION:   Diabetes knowledge and skills assessment:     Patient is knowledgeable in diabetes management concepts related to: Healthy Eating, Being Active, Problem Solving, Reducing Risks and Healthy Coping    Patient needs further education on the following diabetes management concepts: Healthy Eating, Monitoring and Taking Medication    Based on learning assessment above, most appropriate setting for further diabetes education would be: Individual setting.    Education provided today on:  AADE Self-Care Behaviors:  Diabetes Pathophysiology  Healthy Eating: carbohydrate counting, consistency in amount,  composition, and timing of food intake, label reading and role of fiber in blood sugars.  Discussed using labels and exact carbohydrate amounts to work with an insulin to carbohydrate ratio.  Patient verbalized understanding and reports being quick with math.  Discussed fat content of foods in general and to discuss creon dose with PCP if interested in increasing fat as needed for weight re-gain.     Being Active: relationship to blood glucose and possible blood sugar differences between work days and the weekends.    Monitoring: log and interpret results, individual blood glucose targets, frequency of monitoring and use of apps with meter for tracking.  Briefly discussed CGM as possible future option depending on her preference and interest.    Taking Medication: action of prescribed medication and action profiles of long acting vs. Short acting insulin.    Opportunities for ongoing education and support in diabetes-self management were discussed.    Pt verbalized understanding of concepts discussed and recommendations provided today.       Education Materials Provided:  Healthy living with diabetes    PLAN:  See Patient Instructions for co-developed, patient-stated behavior change goals.  AVS printed and provided to patient today. See Follow-Up section for recommended follow-up.    Check blood sugars before and 2 hours after meals for 1 week  -track foods with portion sizes of carbohydrates if possible    [employer] OR [employer] connect rosa- then export logbook- and send to email    After 1 week of checking and tracking send us an E-mail or [employer] message with the information and we'll calculate what to do with basaglar and a mealtime insulin.      Angelic Soliman MS, RD, LD, CDE    Time Spent: 60 minutes  Encounter Type: Individual    Any diabetes medication dose changes were made via the CDE Protocol and Collaborative Practice Agreement with the patient's primary care provider. A copy of this encounter was shared with  the provider.

## 2019-09-24 NOTE — PROGRESS NOTES
Efrain Biswas,       Your recent test results are attached, if you have any questions or concerns please feel free to contact me via e-mail or call [phone].  Your triglycerides are pretty high again/worse. Are you still following at the lipid clinic? I am wondering if we should have you see them again.  Your LDL is good/to goal.       Sincerely,  Dania Munoz PA-C   Is being referred to Neurology- Dr Abraham   Parkinson's disease    Please review and let us know if it's appropriate to schedule.     Thanks

## 2019-09-30 ENCOUNTER — E-VISIT (OUTPATIENT)
Dept: FAMILY MEDICINE | Facility: CLINIC | Age: 39
End: 2019-09-30
Payer: COMMERCIAL

## 2019-09-30 ENCOUNTER — HEALTH MAINTENANCE LETTER (OUTPATIENT)
Age: 39
End: 2019-09-30

## 2019-09-30 DIAGNOSIS — K86.1 CHRONIC PANCREATITIS, UNSPECIFIED PANCREATITIS TYPE (H): ICD-10-CM

## 2019-09-30 DIAGNOSIS — G89.4 CHRONIC PAIN SYNDROME: ICD-10-CM

## 2019-09-30 PROCEDURE — 99444 ZZC PHYSICIAN ONLINE EVALUATION & MANAGEMENT SERVICE: CPT | Performed by: PHYSICIAN ASSISTANT

## 2019-09-30 RX ORDER — OXYCODONE AND ACETAMINOPHEN 5; 325 MG/1; MG/1
1 TABLET ORAL EVERY 8 HOURS PRN
Qty: 15 TABLET | Refills: 0 | Status: SHIPPED | OUTPATIENT
Start: 2019-09-30 | End: 2020-03-24

## 2019-09-30 NOTE — LETTER
[facility]  [address] [facility] [address]  Phone: [phone]    September 30, 2019        Zulay K Flo  [address]  [facility] [address]          To whom it may concern:    RE: Zulay HOANG Flo  Patient was seen and treated today at our clinic and missed work for acute illness.  They may be excused today and return when symptoms improve.       Please contact me for questions or concerns.      Sincerely,        Dania Munoz PA-C

## 2019-10-28 ENCOUNTER — HEALTH MAINTENANCE LETTER (OUTPATIENT)
Age: 39
End: 2019-10-28

## 2019-11-12 ENCOUNTER — OFFICE VISIT (OUTPATIENT)
Dept: ENDOCRINOLOGY | Facility: CLINIC | Age: 39
End: 2019-11-12
Payer: COMMERCIAL

## 2019-11-12 VITALS
RESPIRATION RATE: 16 BRPM | TEMPERATURE: 97.6 F | HEIGHT: 62 IN | WEIGHT: 109.3 LBS | SYSTOLIC BLOOD PRESSURE: 142 MMHG | HEART RATE: 101 BPM | OXYGEN SATURATION: 99 % | DIASTOLIC BLOOD PRESSURE: 112 MMHG | BODY MASS INDEX: 20.11 KG/M2

## 2019-11-12 DIAGNOSIS — E11.65 TYPE 2 DIABETES MELLITUS WITH HYPERGLYCEMIA, WITH LONG-TERM CURRENT USE OF INSULIN (H): Primary | ICD-10-CM

## 2019-11-12 DIAGNOSIS — Z79.4 TYPE 2 DIABETES MELLITUS WITH HYPERGLYCEMIA, WITH LONG-TERM CURRENT USE OF INSULIN (H): Primary | ICD-10-CM

## 2019-11-12 LAB — HBA1C MFR BLD: 7.2 % (ref 0–5.6)

## 2019-11-12 PROCEDURE — 83036 HEMOGLOBIN GLYCOSYLATED A1C: CPT | Performed by: INTERNAL MEDICINE

## 2019-11-12 PROCEDURE — 82043 UR ALBUMIN QUANTITATIVE: CPT | Performed by: INTERNAL MEDICINE

## 2019-11-12 PROCEDURE — 99204 OFFICE O/P NEW MOD 45 MIN: CPT | Performed by: INTERNAL MEDICINE

## 2019-11-12 PROCEDURE — 36415 COLL VENOUS BLD VENIPUNCTURE: CPT | Performed by: INTERNAL MEDICINE

## 2019-11-12 RX ORDER — ICOSAPENT ETHYL 1 G/1
2 CAPSULE ORAL
COMMUNITY
Start: 2019-06-25 | End: 2020-06-18

## 2019-11-12 RX ORDER — ACETAMINOPHEN 500 MG
1000 TABLET ORAL
COMMUNITY
End: 2020-08-28

## 2019-11-12 RX ORDER — INSULIN GLARGINE 100 [IU]/ML
12 INJECTION, SOLUTION SUBCUTANEOUS DAILY
Qty: 15 ML | Refills: 1 | Status: SHIPPED | OUTPATIENT
Start: 2019-11-12 | End: 2019-12-19

## 2019-11-12 RX ORDER — CETIRIZINE HYDROCHLORIDE 10 MG/1
10 TABLET ORAL
COMMUNITY
End: 2020-10-07

## 2019-11-12 ASSESSMENT — MIFFLIN-ST. JEOR: SCORE: 1129.03

## 2019-11-12 NOTE — PATIENT INSTRUCTIONS
[facility] & [place] locations   Dr Hilliard, Endocrinology Department      [facility]   [address]  [place] [address]  Appointment [phone]  Fax: [phone]  [place]: Monday and Tuesday         [facility]   [address]  [place] [address]  Appointment [phone]  Fax: [phone]  [place]: Wednesday and Thursday            Please check the cost coverage and copay with insurance before recommended tests, services and medications (especially if new medications are prescribed).     If ordered, please get blood work done 1 week prior to your next appointment so they will be available to Dr. Hilliard at your visit.    To provide the best diabetic care, please bring your blood glucose meter to each and every visit with your Endocrinologist.  Your blood glucose meter/insulin pump will be downloaded at every appointment.    Please arrive 15 minutes before your scheduled appointment.  This will allow for your blood glucose meter/insulin pump to be downloaded.  If you are wearing DEXCOM please bring  or sharing code so that it can be downloaded.  If you are using freestyle shonda personal sensors please bring the reader.  If you are using TANDEM insulin pump please have your username and password to get info from [employer] website.    Increase Basaglar to 12 units/day.  Labs today.  Your provider has referred you to Diabetes Education: For all [facility]):  Phone [phone]; Fax [phone]  Please call and make the appointment.-->continous glucose monitor (dexom, ipro) (diagnostic)-- when you feel like you are at baseline.  Follow up in endo in 6-8 weeks.    Recommend checking blood sugars before meals and at bedtime.    If Blood glucose are low more often-> 2-3 times/week- give us a call.  The patient is advised to Make better food choices: reduce carbs, Reduce portion size,  weight loss and exercise 3-4 times a week.  Discussed hypoglycemia signs and symptoms as well as management in detail.

## 2019-11-12 NOTE — NURSING NOTE
ENDOCRINOLOGY INTAKE FORM    Patient Name:  Zulay Rossi  :  1980    Is patient Diabetic?   Yes: type 2  Does patient have non-diabetic or other endocrine issues?  Yes: idiopathic chronic pancreatitis, ovarian cyst, acute gouty arthritis, goiter, non-toxic    Vitals: There were no vitals taken for this visit.  BMI= There is no height or weight on file to calculate BMI.    Flu vaccine:  No  Pneumonia vaccine:  No    Smoking and Alcohol use:  Social History     Tobacco Use     Smoking status: Former Smoker     Packs/day: 0.50     Years: 15.00     Pack years: 7.50     Types: Cigarettes     Start date: 2018     Smokeless tobacco: Never Used   Substance Use Topics     Alcohol use: Yes     Comment: occ     Drug use: No       Foot Exam: Yes: 19  Eye Exam:  Yes: 19  Dental Exam:  Yes: 2019  Aspirin Use:  No    Lab Results   Component Value Date    A1C 8.8 2019    A1C 7.3 2019    A1C 7.6 2019    A1C 6.7 2018       No results found for: MICROL  No results found for: MICROALBUMIN    RAISSA [facility]  Tala/Marianela
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
## 2019-11-13 ENCOUNTER — TELEPHONE (OUTPATIENT)
Dept: ENDOCRINOLOGY | Facility: CLINIC | Age: 39
End: 2019-11-13

## 2019-11-13 ENCOUNTER — TRANSFERRED RECORDS (OUTPATIENT)
Dept: HEALTH INFORMATION MANAGEMENT | Facility: CLINIC | Age: 39
End: 2019-11-13

## 2019-11-13 LAB
CREAT UR-MCNC: 225 MG/DL
MICROALBUMIN UR-MCNC: 125 MG/L
MICROALBUMIN/CREAT UR: 55.56 MG/G CR (ref 0–25)

## 2019-11-13 NOTE — TELEPHONE ENCOUNTER
Diabetes Education Scheduling Outreach #1:    Call to patient to schedule. Left message with phone number to call to schedule.    Plan for 2nd outreach attempt within 1 week.    Barbara Huynh  [facility]  Diabetes and Nutrition Scheduling

## 2019-11-25 ENCOUNTER — TELEPHONE (OUTPATIENT)
Dept: FAMILY MEDICINE | Facility: CLINIC | Age: 39
End: 2019-11-25

## 2019-11-25 NOTE — TELEPHONE ENCOUNTER
Panel Management Review      Patient has the following on her problem list:     Diabetes    ASA: Not Required     Last A1C  Lab Results   Component Value Date    A1C 7.2 11/12/2019    A1C 8.8 07/02/2019    A1C 7.3 03/07/2019    A1C 7.6 01/03/2019    A1C 6.7 12/18/2018     A1C tested: MONITOR    Last LDL:    Lab Results   Component Value Date    CHOL 177 06/25/2019     Lab Results   Component Value Date    HDL 73 06/25/2019     Lab Results   Component Value Date    LDL 73 06/25/2019     Lab Results   Component Value Date    TRIG 157 06/25/2019     Lab Results   Component Value Date    CHOLHDLRATIO 3.7 02/14/2015     Lab Results   Component Value Date    NHDL 104 06/25/2019       Is the patient on a Statin? YES             Is the patient on Aspirin? NO    Medications     HMG CoA Reductase Inhibitors     atorvastatin (LIPITOR) 10 MG tablet       Salicylates     ASPIRIN NOT PRESCRIBED (INTENTIONAL)             Last three blood pressure readings:  BP Readings from Last 3 Encounters:   11/12/19 (!) 142/112   07/16/19 102/72   07/05/19 (!) 146/102       Date of last diabetes office visit: 07/16/2019     Tobacco History:     History   Smoking Status     Former Smoker     Packs/day: 0.50     Years: 15.00     Types: Cigarettes     Start date: 4/5/2018   Smokeless Tobacco     Never Used         Hypertension   Last three blood pressure readings:  BP Readings from Last 3 Encounters:   11/12/19 (!) 142/112   07/16/19 102/72   07/05/19 (!) 146/102     Blood pressure: MONITOR    HTN Guidelines:  Less than 140/90      Composite cancer screening  Chart review shows that this patient is due/due soon for the following None  Summary:    Patient is due/failing the following:   BP CHECK and FOLLOW UP    Action needed:   No actions needed as pt already has an appt with PCP on 12/12/2019    Type of outreach:    None    Questions for provider review:    None                                                                                                                                     Carlos Manuel Horowitz MA     Chart routed to closed.

## 2019-12-12 ENCOUNTER — ALLIED HEALTH/NURSE VISIT (OUTPATIENT)
Dept: EDUCATION SERVICES | Facility: CLINIC | Age: 39
End: 2019-12-12
Payer: COMMERCIAL

## 2019-12-12 VITALS — WEIGHT: 109 LBS | BODY MASS INDEX: 19.94 KG/M2

## 2019-12-12 DIAGNOSIS — E11.65 TYPE 2 DIABETES MELLITUS WITH HYPERGLYCEMIA, WITH LONG-TERM CURRENT USE OF INSULIN (H): Primary | ICD-10-CM

## 2019-12-12 DIAGNOSIS — Z79.4 TYPE 2 DIABETES MELLITUS WITH HYPERGLYCEMIA, WITH LONG-TERM CURRENT USE OF INSULIN (H): Primary | ICD-10-CM

## 2019-12-12 PROCEDURE — 95250 CONT GLUC MNTR PHYS/QHP EQP: CPT

## 2019-12-12 NOTE — PATIENT INSTRUCTIONS
1. Plan to wear the LibrePro sensor for 14 days. It is okay to shower, bathe, and swim (up to 3 feet deep for 30 minutes)    2. Continue with your usual diabetes care plan - check blood sugars and take medicines, as prescribed.    3. Keep a log of what you eat and drink, when you take your medications and how much you take, and exercise you do while you are wearing the sensor.    3. Do not cover the sensor with extra adhesive (the small hole in the center of the sensor must remain uncovered)    4. Use a little extra care, especially when getting dressed or exercising, to avoid accidentally loosening or removing the sensor.     5. Remove the sensor if you need to have an MRI or CT scan.     If the LibrePro sensor comes off early, place it in a plastic bag or envelope and call your diabetes educator or bring it with you to your follow-up visit.     Follow-up appointment: 12/19/19    [place] [facility] for the [facility]:  For Your Diabetes Education and Nutrition Appointments Call:  [phone]   For Diabetes Education or Nutrition Related Questions:   Phone: [phone]  E-mail: [email or web address]  Fax: [phone]   If you need a medication refill please contact your pharmacy. Please allow 3 business days for your refills to be completed.    Instructions for emailing the Diabetes Educators    If you need to communicate a non-urgent message to a Diabetes Educator via email, please send to [email or web address].    Please follow the following email guidelines:    Subject line: Secure: your clinic name (example: Secure: [facility])  In the email please include: First name, middle initial, last name and date of birth.    We will be in touch with you within one (1) business day.

## 2019-12-12 NOTE — PROGRESS NOTES
Diabetes Self-Management Education & Support    Professional Continuous Glucose Monitor Insertion    SUBJECTIVE/OBJECTIVE:     Zulay Rossi presents for professional Continuous Glucose Monitor Insertion.     Patient comments/concerns: Assessment for mealtime insulin OR other medication decision.    Lab Results:  Lab Results   Component Value Date    A1C 7.2 11/12/2019      Lab Results   Component Value Date     07/02/2019       Medication:  Diabetes Medication(s)     Insulin       insulin glargine (BASAGLAR KWIKPEN) 100 UNIT/ML pen    Inject 12 Units Subcutaneous daily          ASSESSMENT:    CGM study indicated for: Difficult to manage hypoglycemia and/or hyperglycemia, Unexplained fluctuations in glucose values(Assesment for medications and possibly mealtime insulin)     INTERVENTION:   Sensor started today.     Sensor Type: LibrePro  Lot #: [id number]  Serial #: [id number]  Expiration Date: 02/29/20    Sensor was inserted with no resistance or bleeding at insertion site.      Pt will plan to wear the sensor through 12/19/19, but if new medication is started patient will keep sensor on until 12/26/19 for clinic evaluation OR remote evaluation.    WRITTEN AND VERBAL INFORMATION GIVEN TO SUPPORT UNDERSTANDING OF:  LibrePro CGM: Sensor insertion, intention of monitoring for 14 days. Keep records of BG, food intake, exercise, and medication dosing during wear.       Patient verbalizes understanding of how to remove sensor, if needed, and all instructions provided.     Educational and other materials:  Food/exercise/medication log sheets  Contact information    PLAN:  Pt was given instructions for tracking BG, medications, food intake and activity.  Patient to return all items associated with the professional Continuous Glucose Monitor System.  See Patient Instructions, AVS printed and provided to patient today.    Follow-up:    Follow up on 12/19/19.    Angelic Soliman MS, RD, LD, CDE    Time Spent: 20  minutes  Encounter Type: Individual

## 2019-12-19 ENCOUNTER — ALLIED HEALTH/NURSE VISIT (OUTPATIENT)
Dept: EDUCATION SERVICES | Facility: CLINIC | Age: 39
End: 2019-12-19
Payer: COMMERCIAL

## 2019-12-19 ENCOUNTER — PATIENT OUTREACH (OUTPATIENT)
Dept: EDUCATION SERVICES | Facility: CLINIC | Age: 39
End: 2019-12-19

## 2019-12-19 DIAGNOSIS — E11.65 TYPE 2 DIABETES MELLITUS WITH HYPERGLYCEMIA, WITH LONG-TERM CURRENT USE OF INSULIN (H): Primary | ICD-10-CM

## 2019-12-19 DIAGNOSIS — Z79.4 TYPE 2 DIABETES MELLITUS WITH HYPERGLYCEMIA, WITH LONG-TERM CURRENT USE OF INSULIN (H): ICD-10-CM

## 2019-12-19 DIAGNOSIS — E11.65 TYPE 2 DIABETES MELLITUS WITH HYPERGLYCEMIA, WITH LONG-TERM CURRENT USE OF INSULIN (H): ICD-10-CM

## 2019-12-19 DIAGNOSIS — Z79.4 TYPE 2 DIABETES MELLITUS WITH HYPERGLYCEMIA, WITH LONG-TERM CURRENT USE OF INSULIN (H): Primary | ICD-10-CM

## 2019-12-19 PROCEDURE — G0108 DIAB MANAGE TRN  PER INDIV: HCPCS

## 2019-12-19 RX ORDER — INSULIN GLARGINE 100 [IU]/ML
6 INJECTION, SOLUTION SUBCUTANEOUS DAILY
Qty: 3 ML | Refills: 1 | Status: SHIPPED | OUTPATIENT
Start: 2019-12-19 | End: 2020-08-28

## 2019-12-19 NOTE — TELEPHONE ENCOUNTER
Please confirm dose with patient.  Based on last clinic visit dose is different.    Please pend the orders with correct quantity, select pharmacy and then send for signature. Also associate with correct diagnosis.    Thank you.    Padmini Hilliard MD

## 2019-12-19 NOTE — PATIENT INSTRUCTIONS
Basaglar- take 6 units    Mealtime insulin  Breakfast:  -0-9g carbohydrates= 0 units  -10-20g carbohydrate= 1 unit  -21g and up= 2 units    Lunch and Dinner= 2 units      Schedule with Dr. Mtz- endocrinology    Drop off food records and sensor before 3pm on 12/26/19- I'll review and call you with any changes.

## 2019-12-19 NOTE — TELEPHONE ENCOUNTER
Patient is taking 0-0-0-12 units Basaglar.  With the CGM results I recommend adding in Novolog, but I think that will give her low blood sugars if we do not decrease the Basaglar.  Please suggest alternate plan if you would like different insulin ratios.    Angelic Soliman MS, RD, LD, CDE

## 2019-12-19 NOTE — PROGRESS NOTES
See diabetes education visit 12/19/19 for professional CGM results and interpretation.  Recommend starting mealtime insulin to cover post meal hyperglycemia OR review for alternative plan by endocrinology.  Recommend Basaglar dose reduction with Novolog start to prevent overnight hypoglycemia. Patient to continue wearing sensor and will review again in 1 week.    Please sign orders if you agree OR make alternative plan.    Angelic Soliman MS, RD, LD, CDE

## 2019-12-19 NOTE — Clinical Note
Will route phone note with novolog rx, instructed her to schedule with Dr. Mtz for further medication decision making and assessment.  Please see CDE progress note for continuous glucose monitoring (CGM) reports, assessment and recommendations. As a provider, you can bill for a non face-to-face interpretation of the sensor report. If you feel it is appropriate, please create a 'Documentation Only' encounter noting your interpretation, assessment and your recommended plan and bill for this glucose sensor interpretation using code [id number].

## 2019-12-19 NOTE — PROGRESS NOTES
Diabetes Self-Management Education & Support      Diabetes Education Self-Management & Training: Follow-up and Continuous Glucose Monitor Download    Zulay Rossi presents today for download and report review of Professional continuous glucose monitor device      Current Diabetes Management per Patient:  Diabetes Medication(s)     Insulin       insulin glargine (BASAGLAR KWIKPEN) 100 UNIT/ML pen    Inject 12 Units Subcutaneous daily          Taking Medication Assessed Today: Yes  Current Treatments: Insulin Injections  Dose schedule: at bedtime  Given by: Patient  Injection/Infusion sites: Abdomen  Problems taking diabetes medications regularly?: No  Diabetes medication side effects?: No  Treatment Compliance: All of the time    Most Recent A1c Result:    Lab Results   Component Value Date    A1C 7.2 11/12/2019         Continuous Glucose Monitor Interpretation     Reports:            Consistent day-to-day patterns: Pattern of daytime hyperglycemia, Pattern of post meal hyperglycemia, Pattern of nocturnal hyperglycemia    Healthy Eating  Healthy Eating Assessed Today: Yes  Cultural/Judaism diet restrictions?: No  Patient on a regular basis: Eats 3 meals a day, Has an inconsistent intake of carbohydrates  Meal planning: None  Beverages: Juice, Water(more water at work)    Being Active  Being Active Assessed Today: Yes  Exercise:: Currently not exercising(walks for job)    Monitoring  Monitoring Assessed Today: Yes  Did patient bring glucose meter to appointment? : No  Blood glucose trend: Fluctuating dramtically  Low Glucose Range (mg/dL): 70-90  High Glucose Range (mg/dL): >200  Overall Range (mg/dL): 140-180    Problem Solving  Problem Solving Assessed Today: Yes  Hypoglycemia Frequency: Rarely      Healthy Coping  Emotional response to diabetes: Concern for health and well-being  Informal Support system:: Family  Stage of change: PREPARATION (Decided to change - considering how)  Difficulty affording diabetes  management supplies?: No  Support resources: None  Patient Activation Measure Survey Score:  ALEJANDRA Score (Last Two) 6/7/2011 1/3/2019   ALEJANDRA Raw Score 52 40   Activation Score 100 100   ALEJANDRA Level 4 4         Assessment:  Medication and/or insulin dosing is: accurate    Wake up 630  Work start 8am  Breakfast- 8-9am    basaglar injection- 8-10pm    12/14-16 patient was off work and at home- less walking and activity    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  Daily intake is variable in carbohydrate.  Her blood sugars spike significantly with sugar carbohydrates vs. Potatoes or pasta.  Juice does not appear to spike blood sugars as expected.  It is unclear why blood sugars dropped overnight from 12/16-12-17 other than possibly less carbohydrates, but given low level of activity would have expected blood sugars to stay even overnight, and start to drop on days after a full work day.  Blood sugars are overall lower on work days due to activity.    Given patients history of pancreatitis would recommend adding in mealtime insulin and further review and assessment by Endocrinology for longer blood sugar management plan.  Patient would like to gain weight, but continues to loses weight which is usually attributed to elevated blood sugars.  C-peptide was measured 1 year ago to be 3.1 indicating at that time she was making insulin, this may have changed in the past year.  Current TDD insulin dose is 0.25 unit(s)/kg and A1c is within goal range.    Recommend continue on with current TDD, but balance doses evenly between background and mealtime insulin.    INTERVENTION:   Recommend Basaglar 0-0-0-12 --> 0-0-0-6    Recommend adding in mealtime insulin 0-2 units at breakfast, and 2 units at lunch and dinner.    Mealtime insulin  Breakfast:  -0-9g carbohydrates= 0 units  -10-20g carbohydrate= 1 unit  -21g and up= 2 units    Lunch and Dinner= 2 units    Blood sugars  at breakfast inconsistently spike and sometimes no carbohydrate is eaten.      Diabetes knowledge and skills assessment:     Patient is knowledgeable in diabetes management concepts related to: Healthy Eating, Being Active, Monitoring, Taking Medication, Problem Solving, Reducing Risks and Healthy Coping    Patient needs further education on the following diabetes management concepts: Healthy Eating    Based on learning assessment above, most appropriate setting for further diabetes education would be: Individual setting.    Education provided today on:  AADE Self-Care Behaviors:  Healthy Eating: Reviewed carbohydrate amounts in foods and meals eaten and reviewed post meal elevations and differences between meals.     Taking Medication: Discussed mealtime insulin, action time, risk of low blood sugar, and to prevent overnight lows discussed balancing two kinds of insulin.      CGM-specific education:   Dosing insulin based on sensor glucose results    Pt verbalized understanding of concepts discussed and recommendations provided today.       Education Materials Provided:  No new materials provided today    PLAN:  See Patient Instructions for co-developed, patient-stated behavior change goals.  AVS printed and provided to patient today. See Follow-Up section for recommended follow-up.    Recommend dose reduction to Basaglar 0-0-0-12 --> 0-0-0-6    Recommend starting Novolog insulin 0-2 units at breakfast as discussed and 2 units at lunch and dinner.    Patient to continue wearing sensor, will record foods eaten, and drop off at clinic to review on 12/26/19.  CDE will call patient with assessment.    Angelic Soliman MS, RD, LD, CDE    Time Spent: 35 minutes  Encounter Type: Individual    Any diabetes medication dose changes were made via the CDE Protocol and Collaborative Practice Agreement with the patient's primary care provider and endocrinology provider. A copy of this encounter was shared with the  provider.        .

## 2019-12-23 ENCOUNTER — DOCUMENTATION ONLY (OUTPATIENT)
Dept: ENDOCRINOLOGY | Facility: CLINIC | Age: 39
End: 2019-12-23
Payer: COMMERCIAL

## 2019-12-23 DIAGNOSIS — E11.65 TYPE 2 DIABETES MELLITUS WITH HYPERGLYCEMIA, WITH LONG-TERM CURRENT USE OF INSULIN (H): Primary | ICD-10-CM

## 2019-12-23 DIAGNOSIS — Z79.4 TYPE 2 DIABETES MELLITUS WITH HYPERGLYCEMIA, WITH LONG-TERM CURRENT USE OF INSULIN (H): Primary | ICD-10-CM

## 2019-12-23 PROCEDURE — 95251 CONT GLUC MNTR ANALYSIS I&R: CPT | Performed by: INTERNAL MEDICINE

## 2019-12-23 NOTE — PROGRESS NOTES
Diabetes Self-Management Education & Support      Current Diabetes Management per Patient:       Diabetes Medication(s)      Insulin        insulin glargine (BASAGLAR KWIKPEN) 100 UNIT/ML pen    Inject 12 Units Subcutaneous daily           Most Recent A1c Result:          Lab Results   Component Value Date     A1C 7.2 11/12/2019            Continuous Glucose Monitor Interpretation             Consistent day-to-day patterns: Pattern of daytime hyperglycemia, Pattern of post meal hyperglycemia, Pattern of nocturnal hyperglycemia     Monitoring  Monitoring Assessed Today: Yes  Did patient bring glucose meter to appointment? : No  Blood glucose trend: Fluctuating dramtically  Low Glucose Range (mg/dL): 70-90  High Glucose Range (mg/dL): >200  Overall Range (mg/dL): 140-180        Daily intake is variable in carbohydrate.  Her blood sugars spike significantly with sugar carbohydrates vs. Potatoes or pasta.  Juice does not appear to spike blood sugars as expected.  It is unclear why blood sugars dropped overnight from 12/16-12-17 other than possibly less carbohydrates, but given low level of activity would have expected blood sugars to stay even overnight, and start to drop on days after a full work day.  Blood sugars are overall lower on work days due to activity.     Given patients history of pancreatitis would recommend adding in mealtime insulin and further review and assessment by Endocrinology for longer blood sugar management plan.  Patient would like to gain weight, but continues to loses weight which is usually attributed to elevated blood sugars.  C-peptide was measured 1 year ago to be 3.1 indicating at that time she was making insulin, this may have changed in the past year.  Current TDD insulin dose is 0.25 unit(s)/kg and A1c is within goal range.     Recommend continue on with current TDD, but balance doses evenly between background and mealtime insulin.     Plan:   Recommend dose reduction to Basaglar  0-0-0-12 --> 0-0-0-6     Recommend starting Novolog insulin 0-2 units at breakfast  and 2 units at lunch and dinner.     CGM data reviewed.  Diabetic Educator Assessment and plan reviewed.    I agree with CGM data assessment and plan.    Padmini Hilliard MD

## 2019-12-26 ENCOUNTER — PATIENT OUTREACH (OUTPATIENT)
Dept: EDUCATION SERVICES | Facility: CLINIC | Age: 39
End: 2019-12-26
Payer: COMMERCIAL

## 2019-12-26 NOTE — LETTER
12/26/2019         RE: Zulay Rossi  [address]  [facility] [address]        Hi Zulay,    Enclosed is a copy of the print off if you'd like to review it as well as your food record for reference. No changes recommended to your Basaglar.  I would recommend doing some blood sugar checks 2 hours after you eat.  If you are coming up consistently above 180 after certain meals I would try 3 units OR you may need to vary 2 units sometimes and other larger meals 3 units.    Please schedule an appointment with Dr. Mtz in [place].    Call me if you have any questions or would like to discuss your results.    [place] [facility] for the [facility]:  For Your Diabetes Education and Nutrition Appointments Call:  [phone]   For Diabetes Education or Nutrition Related Questions:   Phone: [phone]  E-mail: [email or web address]  Fax: [phone]   If you need a medication refill please contact your pharmacy. Please allow 3 business days for your refills to be completed.    Take care!    Angelic Soliman MS, RD, LD, CDE

## 2019-12-26 NOTE — PROGRESS NOTES
LibrePro Continuous Glucose Monitor Interpretation     Patient History:   1. Type of Diabetes: Pancreatic insufficiency  2. Current treatment regimen (include all diabetes medications, dose & dosing frequency/timing):   yes:     Diabetes Medication(s)     Insulin       insulin aspart (NOVOLOG FLEXPEN) 100 UNIT/ML pen    Inject 0-2 units at breakfast and 2 units with lunch and dinner.  Use 2 units with each dose to prime. Max TDD=12 units     insulin glargine (BASAGLAR KWIKPEN) 100 UNIT/ML pen    Inject 6 Units Subcutaneous daily        *Abbreviated insulin dose documentation key: Insulin Trade Name (ylcdwjclq-ftskl-xoqxxp-bedtime) - i.e. Humalog 5-5-5-0 (Humalog 5 units at breakfast, 5 units at lunch, and 5 units at dinner).  4. Most Recent A1c Result:    Lab Results   Component Value Date    A1C 7.2 2019     5. Indication/s for LibrePro study: Difficult to manage hypoglycemia and/or hyperglycemia, despite multiple adjustments in self-monitoring of blood glucose and diabetes medication administration.    Statistics:   1. Sensor worn for 13 days.  2. Glucose excursions:   Percent in target is: 50%  Percent above target is: 50%  Percent below target is: 0%  3. Estimated average glucose: 184 mg/dL                              Data evaluation:   1. Sensor modal day evaluation shows the followin. Consistent day-to-day patterns noted: pattern of daytime hyperglycemia.  2. Low glucose events: 0    Patient's Logbook shows the following:   Carbohydrate counting is: accurate  Medication and/or insulin dosing is: accurate    Patient started MDI 19.  Initially blood sugars were significantly elevated post meal, but appear to improved after 3 days.  If post meal elevations continue would recommend increasing meals dose by 1 unit as fasting blood sugars are stable between 110-120.     Assessment and Plan:  Called patient to discuss CGM results.   Left voicemail.    No changes to insulin dose.  Recommend staying  on current doses for 1 week through the holidays.  Recommend patient do 2 hour post meal checks, and if above 180 would recommend trying 3 units with the meal that is consistently elevated.  No changes recommended to Basaglar as fasting blood sugars are 110-120.    Will mail patient copy of report.  Schedule Endocrinology follow up.      Angelic Soliman MS, RD, LD, CDE    Any diabetes medication dose changes were made via the CDE Protocol and Collaborative Practice Agreement with the patient's endocrinology provider. A copy of this encounter was shared with the provider.

## 2020-01-14 ENCOUNTER — OFFICE VISIT (OUTPATIENT)
Dept: ENDOCRINOLOGY | Facility: CLINIC | Age: 40
End: 2020-01-14
Payer: COMMERCIAL

## 2020-01-14 VITALS
HEIGHT: 62 IN | BODY MASS INDEX: 20.87 KG/M2 | HEART RATE: 105 BPM | DIASTOLIC BLOOD PRESSURE: 78 MMHG | WEIGHT: 113.4 LBS | OXYGEN SATURATION: 98 % | SYSTOLIC BLOOD PRESSURE: 118 MMHG

## 2020-01-14 DIAGNOSIS — Z79.4 TYPE 2 DIABETES MELLITUS WITH HYPERGLYCEMIA, WITH LONG-TERM CURRENT USE OF INSULIN (H): Primary | ICD-10-CM

## 2020-01-14 DIAGNOSIS — E11.65 TYPE 2 DIABETES MELLITUS WITH HYPERGLYCEMIA, WITH LONG-TERM CURRENT USE OF INSULIN (H): Primary | ICD-10-CM

## 2020-01-14 PROCEDURE — 99214 OFFICE O/P EST MOD 30 MIN: CPT | Performed by: INTERNAL MEDICINE

## 2020-01-14 ASSESSMENT — MIFFLIN-ST. JEOR: SCORE: 1147.63
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
## 2020-01-14 NOTE — PATIENT INSTRUCTIONS
-No change to basaglar dose.   -Start taking 1 unit of novolog for every 15 grams of carbs on your plate.   -See Meghan in ~ 2 weeks.   -See me in 4 weeks. Lab before next visit.

## 2020-03-15 ENCOUNTER — HEALTH MAINTENANCE LETTER (OUTPATIENT)
Age: 40
End: 2020-03-15

## 2020-03-23 ENCOUNTER — MYC MEDICAL ADVICE (OUTPATIENT)
Dept: FAMILY MEDICINE | Facility: CLINIC | Age: 40
End: 2020-03-23

## 2020-03-23 ENCOUNTER — MYC REFILL (OUTPATIENT)
Dept: FAMILY MEDICINE | Facility: CLINIC | Age: 40
End: 2020-03-23

## 2020-03-23 DIAGNOSIS — R10.10 UPPER ABDOMINAL PAIN: ICD-10-CM

## 2020-03-23 DIAGNOSIS — K86.1 CHRONIC PANCREATITIS, UNSPECIFIED PANCREATITIS TYPE (H): ICD-10-CM

## 2020-03-23 DIAGNOSIS — R11.0 NAUSEA: ICD-10-CM

## 2020-03-23 DIAGNOSIS — G89.4 CHRONIC PAIN SYNDROME: ICD-10-CM

## 2020-03-23 DIAGNOSIS — K86.1 IDIOPATHIC CHRONIC PANCREATITIS (H): ICD-10-CM

## 2020-03-23 RX ORDER — METOCLOPRAMIDE 10 MG/1
TABLET ORAL
Qty: 60 TABLET | Refills: 3 | Status: CANCELLED | OUTPATIENT
Start: 2020-03-23

## 2020-03-23 RX ORDER — SERTRALINE HYDROCHLORIDE 100 MG/1
100 TABLET, FILM COATED ORAL DAILY
Qty: 90 TABLET | Refills: 0 | Status: SHIPPED | OUTPATIENT
Start: 2020-03-23 | End: 2020-06-15

## 2020-03-23 RX ORDER — OXYCODONE AND ACETAMINOPHEN 5; 325 MG/1; MG/1
1 TABLET ORAL EVERY 8 HOURS PRN
Qty: 15 TABLET | Refills: 0 | Status: CANCELLED | OUTPATIENT
Start: 2020-03-23

## 2020-03-23 RX ORDER — ONDANSETRON 8 MG/1
8 TABLET, FILM COATED ORAL EVERY 8 HOURS PRN
Qty: 60 TABLET | Refills: 3 | Status: CANCELLED | OUTPATIENT
Start: 2020-03-23

## 2020-03-23 RX ORDER — PANCRELIPASE 24000; 76000; 120000 [USP'U]/1; [USP'U]/1; [USP'U]/1
CAPSULE, DELAYED RELEASE PELLETS ORAL
Qty: 120 CAPSULE | Refills: 1 | Status: CANCELLED | OUTPATIENT
Start: 2020-03-23

## 2020-03-23 RX ORDER — TIZANIDINE 2 MG/1
2 TABLET ORAL 3 TIMES DAILY PRN
Qty: 60 TABLET | Refills: 1 | Status: CANCELLED | OUTPATIENT
Start: 2020-03-23

## 2020-03-23 NOTE — TELEPHONE ENCOUNTER
Controlled Substance Refill Request for percocet  Problem List Complete:  Yes    Patient is followed by Dania Munoz PA-C for ongoing prescription of pain medication.  All refills should only be approved by this provider, or covering partner.    Medication(s): percocet 5 mg tabs.  Takes 1-2 every 4 hours after pancreatitis attack only.  Does not take daily every month. Takes after acute episodes of pancreatitis which are becoming more frequent.   Maximum quantity per month: 12 per month per pain clinic recommendations and only after a flare in hospital  Clinic visit frequency required: Q 3 months     Controlled substance agreement:  Encounter-Level CSA:     There are no encounter-level csa.        Pain Clinic evaluation in the past: No    DIRE Total Score(s):  No flowsheet data found.    Last [facility] website verification:  done on 4-18-17   [email or web address]     checked in past 3 months?  Yes 3/23/2020   Barbara STEELEN, RN

## 2020-03-23 NOTE — TELEPHONE ENCOUNTER
Haven't seen patient recently. Normally I'd have her come in but during this time an e-visit is appropriate.    Dania Munoz PA-C

## 2020-03-24 ENCOUNTER — E-VISIT (OUTPATIENT)
Dept: FAMILY MEDICINE | Facility: CLINIC | Age: 40
End: 2020-03-24
Payer: COMMERCIAL

## 2020-03-24 DIAGNOSIS — R10.10 UPPER ABDOMINAL PAIN: ICD-10-CM

## 2020-03-24 DIAGNOSIS — E78.1 HIGH BLOOD TRIGLYCERIDES: ICD-10-CM

## 2020-03-24 DIAGNOSIS — I10 BENIGN ESSENTIAL HYPERTENSION: ICD-10-CM

## 2020-03-24 DIAGNOSIS — K86.1 CHRONIC PANCREATITIS, UNSPECIFIED PANCREATITIS TYPE (H): ICD-10-CM

## 2020-03-24 DIAGNOSIS — G89.4 CHRONIC PAIN SYNDROME: ICD-10-CM

## 2020-03-24 DIAGNOSIS — R11.0 NAUSEA: ICD-10-CM

## 2020-03-24 PROCEDURE — 99422 OL DIG E/M SVC 11-20 MIN: CPT | Performed by: PHYSICIAN ASSISTANT

## 2020-03-24 RX ORDER — ONDANSETRON 8 MG/1
8 TABLET, FILM COATED ORAL EVERY 8 HOURS PRN
Qty: 60 TABLET | Refills: 3 | Status: SHIPPED | OUTPATIENT
Start: 2020-03-24 | End: 2021-05-27

## 2020-03-24 RX ORDER — METOCLOPRAMIDE 10 MG/1
TABLET ORAL
Qty: 60 TABLET | Refills: 3 | Status: SHIPPED | OUTPATIENT
Start: 2020-03-24 | End: 2022-01-05

## 2020-03-24 RX ORDER — TIZANIDINE 2 MG/1
2 TABLET ORAL 3 TIMES DAILY PRN
Qty: 60 TABLET | Refills: 1 | Status: SHIPPED | OUTPATIENT
Start: 2020-03-24 | End: 2020-10-07

## 2020-03-24 RX ORDER — OXYCODONE AND ACETAMINOPHEN 5; 325 MG/1; MG/1
1 TABLET ORAL EVERY 8 HOURS PRN
Qty: 15 TABLET | Refills: 0 | Status: SHIPPED | OUTPATIENT
Start: 2020-03-24 | End: 2020-04-01

## 2020-03-24 NOTE — LETTER
[facility]  [address] [address]  Phone: [phone]    March 24, 2020        Zulay Rossi  [address]  [facility] [address]          To whom it may concern:    RE: Zulay Rossi    Patient has diabetes and recurrent pancreatitis.     Please contact me for questions or concerns.      Sincerely,        Dania Munoz PA-C

## 2020-04-01 ENCOUNTER — MYC REFILL (OUTPATIENT)
Dept: FAMILY MEDICINE | Facility: CLINIC | Age: 40
End: 2020-04-01

## 2020-04-01 DIAGNOSIS — K86.1 CHRONIC PANCREATITIS, UNSPECIFIED PANCREATITIS TYPE (H): ICD-10-CM

## 2020-04-01 RX ORDER — OXYCODONE AND ACETAMINOPHEN 5; 325 MG/1; MG/1
1 TABLET ORAL EVERY 8 HOURS PRN
Qty: 15 TABLET | Refills: 0 | Status: SHIPPED | OUTPATIENT
Start: 2020-04-01 | End: 2020-07-09

## 2020-04-01 NOTE — TELEPHONE ENCOUNTER
Controlled Substance Refill Request for percocet  Problem List Complete:  Yes     Patient is followed by Dania Munoz PA-C for ongoing prescription of pain medication.  All refills should only be approved by this provider, or covering partner.     Medication(s): percocet 5 mg tabs.  Takes 1-2 every 4 hours after pancreatitis attack only.  Does not take daily every month. Takes after acute episodes of pancreatitis which are becoming more frequent.   Maximum quantity per month: 12 per month per pain clinic recommendations and only after a flare in hospital  Clinic visit frequency required: Q 3 months      Controlled substance agreement:      Encounter-Level CSA:      There are no encounter-level csa.          Pain Clinic evaluation in the past: No     DIRE Total Score(s):  No flowsheet data found.     Last [facility] website verification:  done on 4-18-17   [email or web address]      checked in past 3 months?  Yes 3/23/2020   Barbara STEELEN, RN

## 2020-04-02 ENCOUNTER — E-VISIT (OUTPATIENT)
Dept: FAMILY MEDICINE | Facility: CLINIC | Age: 40
End: 2020-04-02
Payer: COMMERCIAL

## 2020-04-02 DIAGNOSIS — R50.9 FEVER, UNSPECIFIED: Primary | ICD-10-CM

## 2020-04-02 PROCEDURE — 99422 OL DIG E/M SVC 11-20 MIN: CPT | Performed by: PHYSICIAN ASSISTANT

## 2020-04-02 NOTE — LETTER
[facility]  [address] [facility] [address]  Phone: [phone]    April 2, 2020        Zulay Rossi  [address]  [facility] [address]          To whom it may concern:    RE: Zulay Rossi    Patient has been ill recently due to pancreatitis. She has had a fever off and on with this. She should be ok to return to work without restrictions once she is fever free for 24 hours.     Please contact me for questions or concerns.      Sincerely,        Dania Munoz PA-C

## 2020-04-08 ENCOUNTER — MYC MEDICAL ADVICE (OUTPATIENT)
Dept: ENDOCRINOLOGY | Facility: CLINIC | Age: 40
End: 2020-04-08

## 2020-04-08 NOTE — TELEPHONE ENCOUNTER
Letter faxed to number provided by pt [phone]      Litzy Esteves RN Specialty Triage 4/8/2020 11:33 AM

## 2020-04-08 NOTE — LETTER
Zulay Rossi  [address] [place] [id number]      April 8, 2020      To whom it may concern,    My patient, Zulay Rossi,  has been diagnosed with insulin dependent diabetes mellitus, a lifelong disease that substantially limits endocrine function. Specifically, her body does not produce insulin. Insulin is necessary to convert glucose, which comes from food, into energy that the body can use. Because my patient has diabetes, she uses insulin through self-administration of injections multiple times a day via an insulin pen to manage her diabetes. Without these measures, she would die within days or weeks/experience increased urination, weight loss, kidney failure, diminished vision, and other complications.     My patient is making a request for reasonable accommodations at work due to the COVID-19 pandemic. People with diabetes face a higher chance of experiencing serious complications from COVID-19. In general, people with diabetes are more likely to experience severe symptoms and complications when infected with a virus. For example, viral infections can increase inflammation, or internal swelling, in people with diabetes. This is also caused by above-target blood sugars, and both could contribute to more severe complications.    When sick with a viral infection, people with diabetes also face an increased risk of DKA (diabetic ketoacidosis). DKA can make it challenging to manage fluid intake and electrolyte levels--which is important in managing sepsis. Sepsis and septic shock are some of the more serious complications that people with COVID-19 have experienced.    In the case of my patient, it is my professional opinion that the risk of serious illness related to COVID-19 can be mitigated through the following reasonable accommodations:   telework OR temporary reassignment to another position to accomplish physical distancing, OR temporary leave.    With these accommodations, Zulay can safely and fully  perform all essential job duties.  Please contact me if you have any questions.   Sincerely,    Stone Mtz MD

## 2020-04-27 ENCOUNTER — MYC MEDICAL ADVICE (OUTPATIENT)
Dept: ENDOCRINOLOGY | Facility: CLINIC | Age: 40
End: 2020-04-27

## 2020-05-11 ENCOUNTER — TELEPHONE (OUTPATIENT)
Dept: FAMILY MEDICINE | Facility: CLINIC | Age: 40
End: 2020-05-11

## 2020-05-11 ENCOUNTER — MYC MEDICAL ADVICE (OUTPATIENT)
Dept: FAMILY MEDICINE | Facility: CLINIC | Age: 40
End: 2020-05-11

## 2020-05-28 ENCOUNTER — E-VISIT (OUTPATIENT)
Dept: FAMILY MEDICINE | Facility: CLINIC | Age: 40
End: 2020-05-28
Payer: COMMERCIAL

## 2020-05-28 DIAGNOSIS — L08.9 LOCAL INFECTION OF SKIN AND SUBCUTANEOUS TISSUE: Primary | ICD-10-CM

## 2020-05-28 PROCEDURE — 99421 OL DIG E/M SVC 5-10 MIN: CPT | Performed by: PHYSICIAN ASSISTANT

## 2020-05-29 RX ORDER — MUPIROCIN 20 MG/G
OINTMENT TOPICAL 3 TIMES DAILY
Qty: 30 G | Refills: 0 | Status: SHIPPED | OUTPATIENT
Start: 2020-05-29 | End: 2020-06-18

## 2020-05-29 RX ORDER — CEPHALEXIN 500 MG/1
500 CAPSULE ORAL 3 TIMES DAILY
Qty: 30 CAPSULE | Refills: 0 | Status: SHIPPED | OUTPATIENT
Start: 2020-05-29 | End: 2020-06-18

## 2020-06-15 DIAGNOSIS — G89.4 CHRONIC PAIN SYNDROME: ICD-10-CM

## 2020-06-15 RX ORDER — SERTRALINE HYDROCHLORIDE 100 MG/1
TABLET, FILM COATED ORAL
Qty: 90 TABLET | Refills: 0 | Status: SHIPPED | OUTPATIENT
Start: 2020-06-15 | End: 2021-06-07

## 2020-06-15 NOTE — PROGRESS NOTES
"Subjective     Zulay Rossi is a 40 year old female who presents to clinic today for the following health issues:    HPI     PAP is due per pt declined at this time.    Pt is not fasting. Due for diabetic check.     Lump in L breast per pt x 1 week it is really hurting her. Taking pain medication at night to sleep. Hurts to touch. No injury. Hurts when she moves her left arm/shoulder.     Adopted no history per patient so this concerns her.   No nipple discharge.     Lump and pain started at same time. She is not sure where lump is specifically when I ask her but has several \"small lumps\". She states she knows her breasts are fibrous but she is worried about cancer. We will get imaging.     mood disorder-on zoloft. Doing okay per patient. Denies suicidal or homicidal thoughts.  Patient instructed to go to the emergency room or call 911 if these occur.        Breast /chest pain Onset: x 1 week    Description:   Character: Dull ache and Stabbing  Location: L side breast area  Radiation: None    Intensity: moderate    Progression of Symptoms:  worsening    Accompanying Signs & Symptoms:  Fever/Chills?: no   Gas/Bloating: no   Nausea: no   Vomitting: no   Diarrhea?: no   Constipation:no   Dysuria or Hematuria: no    History:   Trauma: no   Previous similar pain: no    Previous tests done: x-ray    Precipitating factors:   Does the pain change with:     Food: no      BM: no     Urination: no     Therapies Tried and outcome: OTC meds per pt    LMP:  Pt unsure x 1 year now       Has been doing heat. No ice.     Copied from the emergency room note in care everywhere:  Impression and Plan:  Zulay Rossi is a 40 y.o. female who presents to the emergency department with symptoms consistent with left sided musculoskeletal chest wall pain. Her pain is reproducible on exam, worsened with movement, described in a way most suggestive of musculoskeletal pain. We did do a cardiac workup, single troponin given time course was " reassuringly negative and EKG was unremarkable. Chest x-ray was unremarkable. There are no pleuritic findings and her exam is compelling for musculoskeletal chest pain so no workup for PE was initiated. I discussed the results with the patient. Unfortunately, she can't take scheduled ibuprofen due to her pancreas history and therefore we will do a short course of narcotic pain medicines mainly to help with sleep. She will try hot packs to the area as well. I reviewed her chemistry panel which is abnormal, however this is consistent with and attributable to her chronic pancreatitis issues. Patient discharged to home, oxycodone provided and appropriate use discussed.     Discharge Medications:  Medications Prescribed this Visit   Disp Refills Start End   oxyCODONE (ROXICODONE) 5 mg immediate release tablet 10 tablet 0 6/17/2020   Take 1-2 tablets by mouth every 4 hours if needed for Pain   Oral       Diagnosis:   ICD-10-CM   1. Musculoskeletal chest pain R07.89     IEnedina, am serving as a scribe to document services personally performed by Berta Hauser MD, based on my observations and the provider's statements to me.    Emergency Department  [facility] EMERGENCY ROOM          Due for urine drug screen, gets pain medications off and on through me for recurrent pancreatitis flares. This [place] but does fill frequently in small amounts but it is per our agreeement. Either from me or emergency room filled.       Patient Active Problem List   Diagnosis     Mood disorder (H)     Headache     Goiter, non-toxic     Acute gouty arthritis     Elevated uric acid in blood     Ovarian cyst     S/P LEEP of cervix     Tobacco abuse     Acute bronchospasm     Benign essential hypertension     Controlled substance agreement signed     Chronic pain syndrome     Health Care Home     Chronic pancreatitis, unspecified pancreatitis type (H)     Idiopathic  chronic pancreatitis (H)     Fatty infiltration of liver     Type 2 diabetes mellitus with hyperglycemia, with long-term current use of insulin (H)     Past Surgical History:   Procedure Laterality Date     BIOPSY  7/4/2013    Colonoscopy     COLONOSCOPY       HC VAGINAL DELIVERY, NO EPISIOTOMY/FORCEPS  11/2000     LEEP TX, CERVICAL  2003    MILI III     SOFT TISSUE SURGERY  5/2016    Lacerated tendon repair on right ring finger       Social History     Tobacco Use     Smoking status: Former Smoker     Packs/day: 0.50     Years: 15.00     Pack years: 7.50     Types: Cigarettes     Start date: 4/5/2018     Smokeless tobacco: Never Used   Substance Use Topics     Alcohol use: Yes     Comment: occ     Family History   Problem Relation Age of Onset     Unknown/Adopted Mother          Current Outpatient Medications   Medication Sig Dispense Refill     acetaminophen (TYLENOL) 500 MG tablet Take 1,000 mg by mouth       albuterol (2.5 MG/3ML) 0.083% neb solution Take 1 vial (2.5 mg) by nebulization every 4 hours as needed for shortness of breath / dyspnea or wheezing 60 vial 5     albuterol (PROAIR HFA/PROVENTIL HFA/VENTOLIN HFA) 108 (90 Base) MCG/ACT inhaler INHALE 1-2 PUFFS EVERY 4 HOURS AS NEEDED FOR SHORTNESS OF BREATH/DYSPNEA/WHEEZING 8.5 Inhaler 4     amitriptyline (ELAVIL) 25 MG tablet TAKE 1 TABLET (25 MG) BY MOUTH AT BEDTIME 90 tablet 2     amLODIPine (NORVASC) 10 MG tablet Take 1 tablet (10 mg) by mouth daily 90 tablet 1     ASPIRIN NOT PRESCRIBED (INTENTIONAL) Please choose reason not prescribed, below       atorvastatin (LIPITOR) 10 MG tablet TAKE 1 TABLET DAILY IN THE EVENING FOR CHOLESTEROL 90 tablet 3     blood glucose (NO BRAND SPECIFIED) test strip Use to test blood sugar 3 times daily or as directed. verio one touch or brand per insurance. 300 each 1     cetirizine (ZYRTEC) 10 MG tablet Take 10 mg by mouth       CREON 66621-13819 units CPEP per EC capsule TAKE 1 CAPSULE (24,000 UNITS) BY MOUTH 4 TIMES DAILY  AS NEEDED TAKE WITH MEALS AND ONE SNACK 120 capsule 1     fenofibrate micronized (LOFIBRA) 200 MG capsule Take 1 capsule (200 mg) by mouth every morning (before breakfast) 90 capsule 1     indomethacin (INDOCIN) 50 MG capsule Take at onset of gout flare 1 capsule twice daily with meals until symptoms resolve. 60 capsule 0     insulin aspart (NOVOLOG FLEXPEN) 100 UNIT/ML pen Inject 0-2 units at breakfast and 2 units with lunch and dinner.  Use 2 units with each dose to prime. Max TDD=12 units 6 mL 1     insulin glargine (BASAGLAR KWIKPEN) 100 UNIT/ML pen Inject 6 Units Subcutaneous daily 3 mL 1     losartan (COZAAR) 100 MG tablet Take 1 tablet (100 mg) by mouth daily 90 tablet 1     metoclopramide (REGLAN) 10 MG tablet TAKE 1 TABLET (10 MG) BY MOUTH 2 TIMES DAILY AS NEEDED 60 tablet 3     montelukast (SINGULAIR) 10 MG tablet TAKE 1 TABLET (10 MG) BY MOUTH AT BEDTIME FOR COUGH/ALLERGIES 30 tablet 11     ondansetron (ZOFRAN) 8 MG tablet Take 1 tablet (8 mg) by mouth every 8 hours as needed for nausea 60 tablet 3     oxyCODONE (ROXICODONE) 5 MG tablet Take 5-10 mg by mouth       oxyCODONE-acetaminophen (PERCOCET) 5-325 MG tablet Take 1 tablet by mouth every 8 hours as needed for pain Maximum 4 tablet(s) per day 15 tablet 0     ranitidine (ZANTAC) 150 MG tablet Take  by mouth daily.       sertraline (ZOLOFT) 100 MG tablet TAKE 1 TABLET BY MOUTH EVERY DAY 90 tablet 0     tiZANidine (ZANAFLEX) 2 MG tablet Take 1 tablet (2 mg) by mouth 3 times daily as needed for muscle spasms /pain 60 tablet 1     colchicine (COLCYRS) 0.6 MG tablet Take 1 tablet at onset of gout flare.  May repeat 1 tablet after 4 hours if needed. (Patient not taking: Reported on 6/18/2020) 20 tablet 0     No Known Allergies      Reviewed and updated as needed this visit by Provider         Review of Systems   Constitutional, HEENT, cardiovascular, pulmonary, GI, , musculoskeletal, neuro, skin, endocrine and psych systems are negative, except as  otherwise noted.      Objective    /88   Pulse 94   Temp 98.6  F (37  C) (Tympanic)   Resp 14   Wt 49.4 kg (109 lb)   LMP  (LMP Unknown)   SpO2 98%   Breastfeeding No   BMI 19.94 kg/m    Body mass index is 19.94 kg/m .  Physical Exam   GENERAL: alert, no distress and thin per patient with her pancreatitis she has been eating less  RESP: lungs clear to auscultation - no rales, rhonchi or wheezes  BREAST:without masses, or nipple discharge and no palpable axillary masses or adenopathy. She is VERY tender left breast upper quadrant.   CV: regular rate and rhythm, normal S1 S2, no S3 or S4, no murmur, click or rub, no peripheral edema and peripheral pulses strong  MS: no gross musculoskeletal defects noted, no edema  SKIN: no suspicious lesions or rashes  NEURO: Normal strength and tone, mentation intact and speech normal  PSYCH: mentation appears normal, affect normal/bright            Assessment & Plan     1. Breast pain, left  Unsure of cause, seems M/S could be costochondritis, pec strain or other. Will get imaging to make sure nothing in breast tissue. No fevers or redness to suggest abscess but this will look for that.   See below  To emergency room with fevers or severe worsening pain  If imaging is negative, refer to ortho for M/S issue due to significant pain or consider pain management for ? Injection       - MA Diagnostic Digital Bilateral; Future  - US Breast Left Complete 4 Quadrants; Future    2. Mood disorder (H)  stable    3. Other chronic pain  Stable due for urine screen though  States son smokes pot but she does not    - Drug  Screen Comprehensive , Urine with Reported Meds (Med[facility]ox) (Pain Care Package)       Patient Instructions   You need to follow up for diabetic check and pap. Please schedule a 40 min appointment ask to speak with my nurse to schedule this.  You are overdue for both.         [facility] to schedule breast [facility].  Call .          Return in  about 2 weeks (around 7/2/2020) for diabetes recheck, pap.    Dania Munoz PA-C  [facility]

## 2020-06-15 NOTE — TELEPHONE ENCOUNTER
Routing refill request to provider for review/approval because:  Drug interaction warning        Samra Cross BSN, RN

## 2020-06-17 ENCOUNTER — PATIENT OUTREACH (OUTPATIENT)
Dept: CARE COORDINATION | Facility: CLINIC | Age: 40
End: 2020-06-17

## 2020-06-17 DIAGNOSIS — E11.65 TYPE 2 DIABETES MELLITUS WITH HYPERGLYCEMIA, WITH LONG-TERM CURRENT USE OF INSULIN (H): ICD-10-CM

## 2020-06-17 DIAGNOSIS — R07.89 MUSCULOSKELETAL CHEST PAIN: Primary | ICD-10-CM

## 2020-06-17 DIAGNOSIS — Z79.4 TYPE 2 DIABETES MELLITUS WITH HYPERGLYCEMIA, WITH LONG-TERM CURRENT USE OF INSULIN (H): ICD-10-CM

## 2020-06-17 NOTE — PROGRESS NOTES
Clinic Care Coordination Contact  [facility]/Voicemail       Clinical Data: CHW Outreach  Outreach attempted x 1. Left message on patient's voicemail with call back information and requested return call.    Plan: CHW will try to reach patient again in 1-2 business days.    Sandy MEREDITH Community Health Worker  Clinic Care Coordination  [facility] : [place] [facility] [place]  Phone: [phone]        Reason for Referral: Care Transition: ED to outpatient

## 2020-06-17 NOTE — LETTER
[place] CARE COORDINATION    [address] [facility] [address]  June 18, 2020    Zulay Rossi  [address] [address]      Dear Zulay,    I am a clinic community health worker who works with Dania Munoz PA-C at [employer]. I have been trying to reach you recently to introduce Clinic Care Coordination and to see if there was anything I could assist you with.  Below is a description of clinic care coordination and how I can further assist you.      The clinic care coordination team is made up of a registered nurse,  and community health worker who understand the health care system. The goal of clinic care coordination is to help you manage your health and improve access to the health care system in the most efficient manner. The team can assist you in meeting your health care goals by providing education, coordinating services, strengthening the communication among your providers and supporting you with any resource needs.    Please feel free to contact me at [phone] with any questions or concerns. We are focused on providing you with the highest-quality healthcare experience possible and that all starts with you.     Sincerely,     Sandy MEREDITH Community Health Worker  Clinic Care Coordination  [facility] : [place] [facility] [place]  Phone: [phone]

## 2020-06-18 ENCOUNTER — OFFICE VISIT (OUTPATIENT)
Dept: FAMILY MEDICINE | Facility: CLINIC | Age: 40
End: 2020-06-18
Payer: COMMERCIAL

## 2020-06-18 VITALS
OXYGEN SATURATION: 98 % | RESPIRATION RATE: 14 BRPM | DIASTOLIC BLOOD PRESSURE: 88 MMHG | WEIGHT: 109 LBS | BODY MASS INDEX: 19.94 KG/M2 | TEMPERATURE: 98.6 F | SYSTOLIC BLOOD PRESSURE: 134 MMHG | HEART RATE: 94 BPM

## 2020-06-18 DIAGNOSIS — G89.29 OTHER CHRONIC PAIN: ICD-10-CM

## 2020-06-18 DIAGNOSIS — N64.4 BREAST PAIN, LEFT: Primary | ICD-10-CM

## 2020-06-18 DIAGNOSIS — F39 MOOD DISORDER (H): ICD-10-CM

## 2020-06-18 PROCEDURE — 99000 SPECIMEN HANDLING OFFICE-LAB: CPT | Performed by: PHYSICIAN ASSISTANT

## 2020-06-18 PROCEDURE — 80307 DRUG TEST PRSMV CHEM ANLYZR: CPT | Mod: 90 | Performed by: PHYSICIAN ASSISTANT

## 2020-06-18 PROCEDURE — 99214 OFFICE O/P EST MOD 30 MIN: CPT | Performed by: PHYSICIAN ASSISTANT

## 2020-06-18 RX ORDER — OXYCODONE HYDROCHLORIDE 5 MG/1
5-10 TABLET ORAL
COMMUNITY
Start: 2020-06-17 | End: 2020-09-22

## 2020-06-18 NOTE — PATIENT INSTRUCTIONS
You need to follow up for diabetic check and pap. Please schedule a 40 min appointment ask to speak with my nurse to schedule this.  You are overdue for both.         [facility] to schedule breast [facility].  Call .

## 2020-06-18 NOTE — PROGRESS NOTES
Clinic Care Coordination Contact  [facility]/Voicemail       Clinical Data: CHW Outreach  Outreach attempted x 2. Left message on patient's voicemail with call back information and requested return call.    Plan: CHW will send care coordination introduction letter with care coordinator contact information and explanation of care coordination services via mail.    CHW will do no further outreaches at this time.    Sandy MEREDITH Community Health Worker  Clinic Care Coordination  [facility] : [place]  Phone: [phone]        Reason for Referral: Care Transition: ED to outpatient

## 2020-06-23 LAB — PAIN DRUG SCR UR W RPTD MEDS: NORMAL

## 2020-06-25 NOTE — RESULT ENCOUNTER NOTE
Efrain Biswas,       Your recent test results are attached, if you have any questions or concerns please feel free to contact me via e-mail or call [phone].  Your urine drug screen was as expected.       It was a pleasure to see you at your recent office visit.      Sincerely,  Dania Munoz PA-C

## 2020-06-29 ENCOUNTER — ANCILLARY PROCEDURE (OUTPATIENT)
Dept: MAMMOGRAPHY | Facility: CLINIC | Age: 40
End: 2020-06-29
Attending: PHYSICIAN ASSISTANT
Payer: COMMERCIAL

## 2020-06-29 ENCOUNTER — ANCILLARY PROCEDURE (OUTPATIENT)
Dept: ULTRASOUND IMAGING | Facility: CLINIC | Age: 40
End: 2020-06-29
Attending: PHYSICIAN ASSISTANT
Payer: COMMERCIAL

## 2020-06-29 DIAGNOSIS — N64.4 BREAST PAIN, LEFT: ICD-10-CM

## 2020-06-29 DIAGNOSIS — Z11.59 ENCOUNTER FOR SCREENING FOR OTHER VIRAL DISEASES: Primary | ICD-10-CM

## 2020-06-29 PROCEDURE — 76642 ULTRASOUND BREAST LIMITED: CPT | Mod: LT | Performed by: RADIOLOGY

## 2020-06-29 PROCEDURE — 77066 DX MAMMO INCL CAD BI: CPT | Performed by: RADIOLOGY

## 2020-06-29 PROCEDURE — G0279 TOMOSYNTHESIS, MAMMO: HCPCS | Performed by: RADIOLOGY

## 2020-07-05 DIAGNOSIS — Z11.59 ENCOUNTER FOR SCREENING FOR OTHER VIRAL DISEASES: ICD-10-CM

## 2020-07-05 PROCEDURE — U0003 INFECTIOUS AGENT DETECTION BY NUCLEIC ACID (DNA OR RNA); SEVERE ACUTE RESPIRATORY SYNDROME CORONAVIRUS 2 (SARS-COV-2) (CORONAVIRUS DISEASE [COVID-19]), AMPLIFIED PROBE TECHNIQUE, MAKING USE OF HIGH THROUGHPUT TECHNOLOGIES AS DESCRIBED BY CMS-2020-01-R: HCPCS | Performed by: PHYSICIAN ASSISTANT

## 2020-07-05 PROCEDURE — 99207 ZZC NO BILLABLE SERVICE THIS VISIT: CPT

## 2020-07-05 NOTE — LETTER
July 7, 2020        Zulay Rossi  [address] [facility] [place]  [place] [address]    This letter provides a written record that you were tested for COVID-19 on 7/5/20.       Your result was negative. This means that we didn t find the virus that causes COVID-19 in your sample. A test may show negative when you do actually have the virus. This can happen when the virus is in the early stages of infection, before you feel illness symptoms.    If you have symptoms   Stay home and away from others (self-isolate) until you meet ALL of the guidelines below:    You ve had no fever--and no medicine that reduces fever--for 3 full days (72 hours). And      Your other symptoms have gotten better. For example, your cough or breathing has improved. And     At least 10 days have passed since your symptoms started.    During this time:    Stay home. Don t go to work, school or anywhere else.     Stay in your own room, including for meals. Use your own bathroom if you can.    Stay away from others in your home. No hugging, kissing or shaking hands. No visitors.    Clean  high touch  surfaces often (doorknobs, counters, handles, etc.). Use a household cleaning spray or wipes. You can find a full list on the EPA website at www.epa.gov/pesticide-registration/list-n-disinfectants-use-against-sars-cov-2.    Cover your mouth and nose with a mask, tissue or washcloth to avoid spreading germs.    Wash your hands and face often with soap and water.    Going back to work  Check with your employer for any guidelines to follow for going back to work.    Employers: This document serves as formal notice that your employee tested negative for COVID-19, as of the testing date shown above.

## 2020-07-06 LAB
SARS-COV-2 RNA SPEC QL NAA+PROBE: NOT DETECTED
SPECIMEN SOURCE: NORMAL

## 2020-07-07 ENCOUNTER — ANCILLARY PROCEDURE (OUTPATIENT)
Dept: MAMMOGRAPHY | Facility: CLINIC | Age: 40
End: 2020-07-07
Attending: PHYSICIAN ASSISTANT
Payer: COMMERCIAL

## 2020-07-07 ENCOUNTER — ANCILLARY PROCEDURE (OUTPATIENT)
Dept: CT IMAGING | Facility: CLINIC | Age: 40
End: 2020-07-07
Attending: PHYSICIAN ASSISTANT
Payer: COMMERCIAL

## 2020-07-07 DIAGNOSIS — N64.4 BREAST PAIN, LEFT: ICD-10-CM

## 2020-07-07 DIAGNOSIS — R92.1 BREAST CALCIFICATION, RIGHT: Primary | ICD-10-CM

## 2020-07-07 LAB
CREAT BLD-MCNC: 0.9 MG/DL (ref 0.52–1.04)
GFR SERPL CREATININE-BSD FRML MDRD: 70 ML/MIN/{1.73_M2}

## 2020-07-07 PROCEDURE — 77066 DX MAMMO INCL CAD BI: CPT

## 2020-07-07 PROCEDURE — 88341 IMHCHEM/IMCYTCHM EA ADD ANTB: CPT | Mod: 59

## 2020-07-07 PROCEDURE — 88342 IMHCHEM/IMCYTCHM 1ST ANTB: CPT | Mod: 59

## 2020-07-07 PROCEDURE — 88360 TUMOR IMMUNOHISTOCHEM/MANUAL: CPT

## 2020-07-07 PROCEDURE — 88305 TISSUE EXAM BY PATHOLOGIST: CPT

## 2020-07-07 PROCEDURE — 19081 BX BREAST 1ST LESION STRTCTC: CPT | Mod: RT

## 2020-07-07 RX ORDER — IOPAMIDOL 755 MG/ML
82 INJECTION, SOLUTION INTRAVASCULAR ONCE
Status: COMPLETED | OUTPATIENT
Start: 2020-07-07 | End: 2020-07-07

## 2020-07-07 RX ORDER — LIDOCAINE HYDROCHLORIDE AND EPINEPHRINE 10; 10 MG/ML; UG/ML
16 INJECTION, SOLUTION INFILTRATION; PERINEURAL ONCE
Status: COMPLETED | OUTPATIENT
Start: 2020-07-07 | End: 2020-07-07

## 2020-07-07 RX ADMIN — Medication 1 MEQ: at 11:59

## 2020-07-07 RX ADMIN — LIDOCAINE HYDROCHLORIDE AND EPINEPHRINE 16 ML: 10; 10 INJECTION, SOLUTION INFILTRATION; PERINEURAL at 11:59

## 2020-07-07 RX ADMIN — IOPAMIDOL 82 ML: 755 INJECTION, SOLUTION INTRAVASCULAR at 10:24

## 2020-07-09 ENCOUNTER — MYC MEDICAL ADVICE (OUTPATIENT)
Dept: FAMILY MEDICINE | Facility: CLINIC | Age: 40
End: 2020-07-09

## 2020-07-09 ENCOUNTER — MYC REFILL (OUTPATIENT)
Dept: FAMILY MEDICINE | Facility: CLINIC | Age: 40
End: 2020-07-09

## 2020-07-09 DIAGNOSIS — K86.1 CHRONIC PANCREATITIS, UNSPECIFIED PANCREATITIS TYPE (H): ICD-10-CM

## 2020-07-10 ENCOUNTER — TELEPHONE (OUTPATIENT)
Dept: GENERAL RADIOLOGY | Facility: CLINIC | Age: 40
End: 2020-07-10

## 2020-07-10 ENCOUNTER — TELEPHONE (OUTPATIENT)
Dept: FAMILY MEDICINE | Facility: CLINIC | Age: 40
End: 2020-07-10

## 2020-07-10 LAB — COPATH REPORT: NORMAL

## 2020-07-10 RX ORDER — OXYCODONE AND ACETAMINOPHEN 5; 325 MG/1; MG/1
1 TABLET ORAL EVERY 8 HOURS PRN
Qty: 15 TABLET | Refills: 0 | Status: SHIPPED | OUTPATIENT
Start: 2020-07-10 | End: 2020-07-16

## 2020-07-10 NOTE — TELEPHONE ENCOUNTER
RX was picked up from  by Patient. ID was checked and patient  label was attached to patient  log.

## 2020-07-10 NOTE — TELEPHONE ENCOUNTER
"Zulay called the [facility] RN with concern of bruising, tenderness, and hematoma after her right breast biopsy. Writer assessed pt via phone. Pt denies active breast swelling and bleeding. She has breast tenderness, which she requested pain medication for, and received from her primary care provider. She stated she had significant bleeding during the biopsy with 30 min of applied pressure after biopsy, but denies bleeding at home. Pt wanted to know if this bruising was \"normal\". Writer reassured pt that this bruising is normal and expected after significant bleeding during and after biopsy. Writer encouraged application of ice, and continuation of pain medication as directed, along with monitoring/assessment of the breast. Writer advised pt to go in to urgent care or ER for bleeding management if she has concern for active bleeding (swelling of breast and/or vineet blood from biopsy site). Pt stated that it is not bleeding and she feels relieved knowing that the bruising is part of the healing process. Pain is being managed with medication and ice. Pt verbalized understanding and all questions and concerns have been addressed. Writer will call pt again, as soon as her breast biopsy results are available.  "

## 2020-07-10 NOTE — TELEPHONE ENCOUNTER
Printed, she will have to come . I dont think they can fix my phone today they said.     Dania Munoz PA-C

## 2020-07-10 NOTE — TELEPHONE ENCOUNTER
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 Discussed the radiologist's recommendation for surgical consultation. Zulay is scheduled to see Dr. Cohen on 8/3 in [place]. Pt verbalized understanding and all questions were addressed.

## 2020-07-10 NOTE — TELEPHONE ENCOUNTER
Controlled Substance Refill Request for percocet  Problem List Complete:  Yes     Patient is followed by Dania Munoz PA-C for ongoing prescription of pain medication.  All refills should only be approved by this provider, or covering partner.     Medication(s): percocet 5 mg tabs.  Takes 1-2 every 4 hours after pancreatitis attack only.  Does not take daily every month. Takes after acute episodes of pancreatitis which are becoming more frequent.   Maximum quantity per month: 12 per month per pain clinic recommendations and only after a flare in hospital  Clinic visit frequency required: Q 3 months      Controlled substance agreement:        Encounter-Level CSA:      There are no encounter-level csa.          Pain Clinic evaluation in the past: No     DIRE Total Score(s):  No flowsheet data found.     Last [facility] website verification:  done on 4-18-17   [email or web address]      checked in past 3 months?  Yes 7/10/2020   Barbara STEELEN, RN

## 2020-07-13 ENCOUNTER — MYC MEDICAL ADVICE (OUTPATIENT)
Dept: FAMILY MEDICINE | Facility: CLINIC | Age: 40
End: 2020-07-13

## 2020-07-14 ENCOUNTER — MYC MEDICAL ADVICE (OUTPATIENT)
Dept: FAMILY MEDICINE | Facility: CLINIC | Age: 40
End: 2020-07-14

## 2020-07-14 NOTE — TELEPHONE ENCOUNTER
Please have her schedule a virtual visit on Thursday so we can go over the paperwork I will need her help filling it out correctly.     Dania Munoz PA-C

## 2020-07-15 ENCOUNTER — TRANSFERRED RECORDS (OUTPATIENT)
Dept: HEALTH INFORMATION MANAGEMENT | Facility: CLINIC | Age: 40
End: 2020-07-15

## 2020-07-15 LAB
CREAT SERPL-MCNC: 0.74 MG/DL (ref 0.57–1.11)
GFR SERPL CREATININE-BSD FRML MDRD: >60 ML/MIN/1.73M2
GLUCOSE SERPL-MCNC: 157 MG/DL (ref 65–100)
POTASSIUM SERPL-SCNC: 3.6 MMOL/L (ref 3.5–5)

## 2020-07-16 ENCOUNTER — TELEPHONE (OUTPATIENT)
Dept: FAMILY MEDICINE | Facility: CLINIC | Age: 40
End: 2020-07-16

## 2020-07-16 ENCOUNTER — VIRTUAL VISIT (OUTPATIENT)
Dept: FAMILY MEDICINE | Facility: CLINIC | Age: 40
End: 2020-07-16
Payer: COMMERCIAL

## 2020-07-16 DIAGNOSIS — D64.9 ANEMIA, UNSPECIFIED TYPE: ICD-10-CM

## 2020-07-16 DIAGNOSIS — N60.91 ATYPICAL DUCTAL HYPERPLASIA OF RIGHT BREAST: ICD-10-CM

## 2020-07-16 DIAGNOSIS — N64.89 HEMATOMA OF RIGHT BREAST: Primary | ICD-10-CM

## 2020-07-16 PROCEDURE — 99214 OFFICE O/P EST MOD 30 MIN: CPT | Mod: GT | Performed by: PHYSICIAN ASSISTANT

## 2020-07-16 RX ORDER — OXYCODONE AND ACETAMINOPHEN 5; 325 MG/1; MG/1
1 TABLET ORAL EVERY 8 HOURS PRN
Qty: 15 TABLET | Refills: 0 | Status: SHIPPED | OUTPATIENT
Start: 2020-07-16 | End: 2020-08-20

## 2020-07-16 NOTE — PROGRESS NOTES
"Zulay Rossi is a 40 year old female who is being evaluated via a billable video visit.      The patient has been notified of following:     \"This video visit will be conducted via a call between you and your physician/provider. We have found that certain health care needs can be provided without the need for an in-person physical exam.  This service lets us provide the care you need with a video conversation.  If a prescription is necessary we can send it directly to your pharmacy.  If lab work is needed we can place an order for that and you can then stop by our lab to have the test done at a later time.    Video visits are billed at different rates depending on your insurance coverage.  Please reach out to your insurance provider with any questions.    If during the course of the call the physician/provider feels a video visit is not appropriate, you will not be charged for this service.\"    Patient has given verbal consent for Video visit? Yes  How would you like to obtain your AVS? MyChart  If you are dropped from the video visit, the video invite should be resent to: Text to cell phone: [phone]  Will anyone else be joining your video visit? No    Subjective     Zulay Rossi is a 40 year old female who presents today via video visit for the following health issues:    HPI    Forms /she was at [facility] with labs done yesterday.      Had biopsy on the 7-7 of breast due to abnormal mammogram. Patient was having breast patient prior to that on left side but right side had the abnormality.       appt 8-3 with breast surgeon for breast biopsy results. Had Atypical ductal hyperplasia.  Right breast.  Went to [facility] due severe pain from hematoma still a week after biopsy. They did not find an abscess just a hematoma. She had requested more pain medications for this. She has acute on chronic pancreatitis frequently and gets pain medications for this through me we have a contract. This was recommended by pain " clinic initially.     Patient needs FMLA forms for this because of the pain and its worse with movement of her arm. She has been taking 1-2 percocets per day. We discussed side effects and risks of this medication today including addiction, tolerance, increased sedation, respiratory depression, and possibly overdose if not taken as directed.   They will not mix this medication with alcohol or other sedating medications. They will not drive after taking this medication.  Patient states understanding. They verbally agreed to use their medication responsibly.       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     Hurts to move her right arm.       Video Start Time: 2:04 PM            Patient Active Problem List   Diagnosis     Mood disorder (H)     Headache     Goiter, non-toxic     Acute gouty arthritis     Elevated uric acid in blood     Ovarian cyst     S/P LEEP of cervix     Tobacco abuse     Acute bronchospasm     Benign essential hypertension     Controlled substance agreement signed     Chronic pain syndrome     Health Care Home     Chronic pancreatitis, unspecified pancreatitis type (H)     Idiopathic chronic pancreatitis (H)     Fatty infiltration of liver     Type 2 diabetes mellitus with hyperglycemia, with long-term current use of insulin (H)     Past Surgical History:   Procedure Laterality Date     BIOPSY  7/4/2013    Colonoscopy     COLONOSCOPY       HC VAGINAL DELIVERY, NO EPISIOTOMY/FORCEPS  11/2000     LEEP TX, CERVICAL  2003    MILI III     SOFT TISSUE SURGERY  5/2016    Lacerated tendon repair on right ring finger       Social History     Tobacco Use     Smoking status: Former Smoker     Packs/day: 0.50     Years: 15.00     Pack years: 7.50     Types: Cigarettes     Start date: 4/5/2018     Smokeless tobacco: Never Used   Substance Use Topics     Alcohol use: Yes     Comment: occ     Family History   Problem Relation Age of Onset     Unknown/Adopted Mother          Current  Outpatient Medications   Medication Sig Dispense Refill     acetaminophen (TYLENOL) 500 MG tablet Take 1,000 mg by mouth       albuterol (2.5 MG/3ML) 0.083% neb solution Take 1 vial (2.5 mg) by nebulization every 4 hours as needed for shortness of breath / dyspnea or wheezing 60 vial 5     albuterol (PROAIR HFA/PROVENTIL HFA/VENTOLIN HFA) 108 (90 Base) MCG/ACT inhaler INHALE 1-2 PUFFS EVERY 4 HOURS AS NEEDED FOR SHORTNESS OF BREATH/DYSPNEA/WHEEZING 8.5 Inhaler 4     amitriptyline (ELAVIL) 25 MG tablet TAKE 1 TABLET (25 MG) BY MOUTH AT BEDTIME 90 tablet 2     amLODIPine (NORVASC) 10 MG tablet Take 1 tablet (10 mg) by mouth daily 90 tablet 1     atorvastatin (LIPITOR) 10 MG tablet TAKE 1 TABLET DAILY IN THE EVENING FOR CHOLESTEROL 90 tablet 3     cetirizine (ZYRTEC) 10 MG tablet Take 10 mg by mouth       colchicine (COLCYRS) 0.6 MG tablet Take 1 tablet at onset of gout flare.  May repeat 1 tablet after 4 hours if needed. 20 tablet 0     CREON 46401-88659 units CPEP per EC capsule TAKE 1 CAPSULE (24,000 UNITS) BY MOUTH 4 TIMES DAILY AS NEEDED TAKE WITH MEALS AND ONE SNACK 120 capsule 1     fenofibrate micronized (LOFIBRA) 200 MG capsule Take 1 capsule (200 mg) by mouth every morning (before breakfast) 90 capsule 1     indomethacin (INDOCIN) 50 MG capsule Take at onset of gout flare 1 capsule twice daily with meals until symptoms resolve. 60 capsule 0     insulin aspart (NOVOLOG FLEXPEN) 100 UNIT/ML pen Inject 0-2 units at breakfast and 2 units with lunch and dinner.  Use 2 units with each dose to prime. Max TDD=12 units 6 mL 1     insulin glargine (BASAGLAR KWIKPEN) 100 UNIT/ML pen Inject 6 Units Subcutaneous daily 3 mL 1     losartan (COZAAR) 100 MG tablet Take 1 tablet (100 mg) by mouth daily 90 tablet 1     metoclopramide (REGLAN) 10 MG tablet TAKE 1 TABLET (10 MG) BY MOUTH 2 TIMES DAILY AS NEEDED 60 tablet 3     montelukast (SINGULAIR) 10 MG tablet TAKE 1 TABLET (10 MG) BY MOUTH AT BEDTIME FOR COUGH/ALLERGIES 30  tablet 11     ondansetron (ZOFRAN) 8 MG tablet Take 1 tablet (8 mg) by mouth every 8 hours as needed for nausea 60 tablet 3     oxyCODONE-acetaminophen (PERCOCET) 5-325 MG tablet Take 1 tablet by mouth every 8 hours as needed for pain Maximum 4 tablet(s) per day 15 tablet 0     ranitidine (ZANTAC) 150 MG tablet Take  by mouth daily.       sertraline (ZOLOFT) 100 MG tablet TAKE 1 TABLET BY MOUTH EVERY DAY 90 tablet 0     tiZANidine (ZANAFLEX) 2 MG tablet Take 1 tablet (2 mg) by mouth 3 times daily as needed for muscle spasms /pain 60 tablet 1     ASPIRIN NOT PRESCRIBED (INTENTIONAL) Please choose reason not prescribed, below       blood glucose (NO BRAND SPECIFIED) test strip Use to test blood sugar 3 times daily or as directed. verio one touch or brand per insurance. 300 each 1     oxyCODONE (ROXICODONE) 5 MG tablet Take 5-10 mg by mouth       No Known Allergies    Reviewed and updated as needed this visit by Provider         Review of Systems   Constitutional, HEENT, cardiovascular, pulmonary, GI, , musculoskeletal, neuro, skin, endocrine and psych systems are negative, except as otherwise noted.      Objective             Physical Exam     GENERAL: Healthy, alert and no distress  EYES: Eyes grossly normal to inspection.  No discharge or erythema, or obvious scleral/conjunctival abnormalities.  RESP: No audible wheeze, cough, or visible cyanosis.  No visible retractions or increased work of breathing.    SKIN: Visible skin clear. No significant rash, abnormal pigmentation or lesions.  NEURO: Cranial nerves grossly intact.  Mentation and speech appropriate for age.  PSYCH: Mentation appears normal, affect normal/bright, judgement and insight intact, normal speech and appearance well-groomed.      Diagnostic Test Results:  Labs reviewed in Epic        Assessment & Plan     1. Hematoma of right breast  See workability under scanned fmla    2. Anemia, unspecified type  See below for more workup in future  I suspect  platelets were low due to large hematoma and should normalize  If not will refer to hematology      - **CBC with platelets FUTURE anytime; Future  - Ferritin; Future  - Iron and iron binding capacity; Future  - Folate; Future  - Vitamin B12; Future    3. Atypical ductal hyperplasia of right breast    Continue with surgeon   avoid estrogens in future    Patient Instructions   Repeat labs only in 1 month to look at platelets and anemia and repeat white blood cell count I think these were more off due to your hematoma but we should recheck them                   Return in about 4 weeks (around 8/13/2020) for Lab Work.    Dania Munoz PA-C  [facility]      Video-Visit Details    Type of service:  Video Visit    Video End Time:2:25 PM    Originating Location (pt. Location): Home    Distant Location (provider location):  [facility]     Platform used for Video Visit: Doximity    Return in about 4 weeks (around 8/13/2020) for if not improving.       Dania Munoz PA-C

## 2020-07-16 NOTE — TELEPHONE ENCOUNTER
Faxed FMLA forms to Amina [employer] @ [phone]. Sent to stat scanning and placed the original at the  for patient to ..Susan Ortega MA/PIERCE

## 2020-07-16 NOTE — TELEPHONE ENCOUNTER
Reason for Call:  Other fax or call    Detailed comments: fmla form: on #6 it is written four hours per day one day a week,  It is supposed to read 4 hours a day for 5 days per week.  Please fax corrected form to: [phone] or call her at [phone]  Phone Number Patient can be reached at: Other phone number:  [email or web address]    Best Time: any    Can we leave a detailed message on this number? Not Applicable    Call taken on 7/16/2020 at 3:11 PM by Kristen Croft

## 2020-07-16 NOTE — PATIENT INSTRUCTIONS
Repeat labs only in 1 month to look at platelets and anemia and repeat white blood cell count I think these were more off due to your hematoma but we should recheck them

## 2020-07-17 NOTE — TELEPHONE ENCOUNTER
Can you clarify with patient also? Because to me it sounded like she was asking for one day a week for 4 hours.  im coming in Tuesday to do any paperwork needed so I can do it then. Thanks, chi

## 2020-07-17 NOTE — TELEPHONE ENCOUNTER
Dania Tammy is out of this office until 7/27/20. I tried to call Amina @ [phone], but was a generic voicemail and did not leave a message to inform her that Am is out of the office.    Dania, form placed in your basket-see not as to what they need changed.Susan Ortega MA/TC

## 2020-07-17 NOTE — TELEPHONE ENCOUNTER
Called and spoke to patient. She said that she would like to work 5 days  Week, but 4 hours daily. (part time).She thought that  That is what you had discussed.Susan Ortega MA/PIERCE

## 2020-07-20 ENCOUNTER — OFFICE VISIT (OUTPATIENT)
Dept: SURGERY | Facility: CLINIC | Age: 40
End: 2020-07-20
Payer: COMMERCIAL

## 2020-07-20 DIAGNOSIS — N60.91 ATYPICAL DUCTAL HYPERPLASIA OF RIGHT BREAST: ICD-10-CM

## 2020-07-20 DIAGNOSIS — N64.89 BREAST HEMATOMA: Primary | ICD-10-CM

## 2020-07-20 PROCEDURE — 99203 OFFICE O/P NEW LOW 30 MIN: CPT | Performed by: SURGERY

## 2020-07-20 NOTE — PATIENT INSTRUCTIONS
Surgery Instructions    Always follow your surgeon s instructions. If you don t, your surgery could be cancelled. Please use the following checklist.  Your surgery is on: The surgery scheduler will contact you within 1 week of your consult with the surgeon. If you do not hear from them, please call the clinic or RN Care Coordinator for your provider.    Time: Prearrival times can vary depending on location/type of surgery.  [place] - 2 hour pre-arrival  [facility] - 2 hour pre-arrival  [place] - 1 hour pre-arrival    Note:  These times may change. A nurse will call you before surgery to confirm. If you have not received a call or if you have more questions, please call us on the working day before your surgery:  ? [place]: [phone] or [phone] (9am to 5:30pm)  ? [facility]: [phone] (8am to 6pm)  ? [place]: [phone] (9am to 5pm)  ? [facility] [phone] (7am to 4pm)  Prior to surgery  ? Have a pre-op physical exam with your Primary Doctor within 30 days of surgery  - Ask your doctor to send all of your results to the surgery center/hospital before surgery. Your doctor also may ask you to bring the results with you on the day of surgery.  - Tell your doctor if:  - You are allergic to latex or rubber (latex and rubber gloves are often used in medical care).  - You are taking any medicines (including aspirin), vitamins, or herbal products. You may need to stop taking some medicines before surgery.  - You have any medical problems (allergies, diabetes, or heart disease, for example).  - You have a pacemaker or an AICD (automatic implanted cardiac defibrillator). If you do, please bring the ID card with you on the day of surgery.  - People who smoke have a higher risk of infection after surgery. Ask your doctor how you can quit smoking.  - If you Primary Doctor is not within the [facility] system, you will need to have your pre-op physical faxed to us to be scanned into your  chart.  - [place]: [phone] or [phone]  - [facility]): [phone]  - [facility] ([facility]): [phone]  ? Call your insurance company. Ask if you need pre-approval for your surgery. If you do not have insurance, please let us know. If you wish to speak to the , please alert the clinic staff so this can be arranged.  ? Arrange for someone to drive you home after surgery.  will need to be a responsible adult (18 years or older) that will provide transportation to and from surgery and stay in the waiting room during your surgery. You may not drive yourself or take public transportation to and from surgery.  ? Arrange for someone to stay with you for 24 hours after you go home. This person must be a responsible adult (18 years or older).  ? Call your surgeon or their nurse if there is any change in your health (cold, flu, infections, hospitalizations).  ? Do not smoke, drink alcohol, or take over-the-counter medicine for 24 hours before and after surgery.  ? If you take prescribed drugs, you may need to stop them until after the surgery.  Discuss what medications to take or not take prior to surgery with your Primary Doctor at your pre-op physical. Avoid over-the-counter blood-thinning medications such as Aspirin, Ibuprofen, vitamin E, or fish oil 7 days prior to surgery (unless otherwise directed by your Primary Doctor). Tylenol is a good alternative for mild pain relief prior to surgery.  ? Eating and drinking guidelines prior to surgery:  - Stop all solid food consumption 8 hours prior to surgery  - You may drink clear liquids up to 2 hours prior to surgery (water, fruit juices without pulp, jello, tea/coffee without creamer, sports drinks, clear-fat free broth (bouillon or consomme), popsicles (without milk, bits of fruit, or seeds/nuts)  ? Follow instructions given for showering or bathing before surgery.    - Use 8 ounces of antiseptic surgical soap,  like:  - Hibiclens, Scrub Care, or Exidine  - You can find it at your local pharmacy, clinic, or retail store. If you have trouble, ask your pharmacist to help you find the right substitute.  - Please wash with one of the above soaps twice before coming to the hospital for your surgery. This will decrease bacteria (germs) on your skin. It will also help reduce your chance of infection after surgery.  - Items you will need for showering:  - 4 newly washed washcloths  - 2 newly washed towels  - 8 ounces of one of the above soaps  - Following these instructions:  - The evening before surgery: Shower or bathe as you normally would, using your regular soap and a clean washcloth. Give special attention to places where your incision (surgical cut) or catheters will be. This includes your groin area. Rinse well. You may wash your hair with your regular shampoo. Next, wash your body with 4 ounces of the antiseptic soap. Use a clean, damp washcloth and gently clean your body (from the chin down). If your surgery involves your head, use the special soap on your head and scalp. Rinse well and dry off using a newly washed towel.  - The morning of surgery: Repeat the same process as the evening shower.  - Other suggestions:    Do not shave within 12 inches of your incision (surgical cut) area for at least 3 days before surgery. Shaving can make small cuts in the skin. This puts you at higher risk of infection.    Wear freshly washed pajamas or clothing after your evening shower.    Wear freshly washed clothes the day of surgery.    Wash and change your bed sheets the day before surgery to have clean bed sheets after your shower and when you get home from surgery.    If you have trouble washing all areas, make sure someone helps you.    Don't use any deodorant, lotion or powder after your shower.    Women who are menstruating should wear a fresh sanitary pad to the hospital.  ? Do not wear or add deodorant, cologne, lotion,  makeup, nail polish or jewelry to surgery. If you wear fake nails, please remove at least one nail before coming to surgery (an oxygen monitor needs to be placed on your finger during surgery).  ? Bring these items to the surgery center/hospital:  - Insurance card  - Money for parking and co-pays, if needed  - A list of all the medicines you take. Include vitamins, minerals, herbs, and over-the-counter drugs.  Note any drug allergies.  - A copy of your advance health care directive, if you have one. This tells us what treatment you would want--and who would make health care decisions--if you could no longer speak for yourself. You may request this form in advance or download it from [email or web address].  - A case for glasses, contact lenses, hearing aids, or dentures.  - Your inhaler or CPAP machine, if you use these at home.  ? Leave extra cash, jewelry, and other valuables at home.  When you arrive  When you get to the surgery center/hospital, you will:  ? Check in. If you are under age 18, you must be with a parent or legal guardian.  ? Sign consent forms, if you haven t already. These forms state that you know the risks and benefits of surgery. When you sign the forms, you give us permission to do the surgery. Do not sign them unless you understand what will happen during and after your surgery. If you have any questions about your surgery, ask to speak with your doctor before you sign the forms. If you don t understand the answers, ask again.  ? Receive a copy of the Patient s Bill of Rights. If you do not receive a copy, please ask for one.  ? Change into hospital clothes. Your belongings will be placed in a bag. We will return them to you after surgery.  ? Meet with the anesthesia provider. He or she will tell you what kind of anesthesia (medicine) will be used to keep you comfortable during surgery.  Remember: it s okay to remind doctors and nurses to wash their hands before touching you.  In most  cases, your surgeon will use a marker to write his or her initials on the surgery site. This ensures that the exact site is operated on.  For safety reasons, we will ask you the same questions many times. For example, we may ask your name and birth date over and over again.  Friends and family can stay with you until it s time for surgery. While you re in surgery, they will be in the waiting area. Please note that cell phones are not allowed in some patient care areas.  If you have questions about what will happen in the operating room, talk to your care team.  After surgery  We will move you to a recovery room, where we will watch you closely. If you have any pain or discomfort, tell your nurse. He or she will try to make you comfortable.  If you are staying overnight, we will move you to your hospital room after you are awake.  If you are going home, we will move you to another room. Friends and family may be able to join you. The length of time you spend in recovery depend on the type of medicine you received, your medical condition, the type of surgery you had, or your response to the anesthesia given during your procedure.  When you are discharged from the recovery room, the nurses will review instructions with you and your caregiver.  ? Please wash your hands every time you touch the wound or change bandages or dressings.  ? Do not submerge the wound in water.  You may not use a bathtub or hot tub until the wound is closed. The wait time frame is generally 2-3 weeks, but any open area can be a source of incoming bacteria, so it is better to be on the safe side and avoid water submersion until your wound is fully healed.  ? You may take a shower 24 hours after surgery. Double check with your surgeon if it is OK for water to run over the wound, whether it has been sutured, stapled, glued, or is open. You may gently wash the wound using the antiseptic soap provided for your pre-surgery showering (do not use a  washcloth). Any mild soap will work as well.  ? Many surgical wounds will have small white strips of tape on them called steri-strips.  Do not remove these. The edges will curl and fall off within 7-10 days with normal showering.  ? If you are going home with sutures (stitches) or staples, you must return to the clinic to have them taken out, usually within 1-2 weeks. Some stitches are dissolvable and do not require removal. Make sure to clarify with your surgeon or surgery nurse reviewing discharge paperwork what kind of sutures you have.  ? Signs and symptoms of infection include:  - Fever, temperature over 101.5   F  - Redness  - Swelling  - Increased pain  - Green or yellow drainage which may or may not have a foul odor  Dealing with pain  A nurse will check your comfort level often during your stay. He or she will work with you to manage your pain.  Remember:  ? All pain is real. There are many ways to control pain. We can help you decide what works best for you.  ? Ask for pain medicine when you need it. Don t try to  tough it out --this can make you feel worse. Always take your medicine as ordered.  ? Medicine doesn t work the same for everyone. If your medicine isn t working, tell your nurse. There may be other medicines or treatments we can try.  Going home  We will let you know when you re ready to leave the surgery center or hospital. Before you leave, we will tell you how to care for yourself at home and prevent infections. If you do not understand something, please say so. We will answer any questions you have. We will then help you get ready to leave.  Remember, you must have a responsible adult (18 years or older) to stay with you 24 hours after you leave the hospital.  Take it easy when you get home. You will need some time to recover--you may be more tired than you realize at first. Rest and relax for at least the first 24 hours at home. You ll feel better and heal faster if you take good care of  yourself.  Follow the discharge instructions that are given to you when you leave the surgery center or hospital  Please call the clinic if you experience any problems during regular clinic hours (Monday-Friday 8:00am-5:00pm).  If you experience problems during non-clinic hours, please call the [facility] on-call line at [phone] and ask the  to page the on-call Provider for your specialty. The on-call Provider will call you back and can triage your symptoms and further advise. If you are having an emergency, always call 911 or seek immediate evaluation at the Emergency Room.  Locations  [facility]  Same-day surgery center - 2nd floor, check-in #5  [address] [place] [address]  [phone]  [email or web address]  [address] [place], [place] [address] [phone]   [email or web address]    [address] [address] [phone] (patient registration)  [phone] (main line)  [email or web address]  [facility]  [address] [place] [address]  [facility], 3rd floor for check-in  [phone] (patient registration)  [phone] (main line)  [email or web address]  [facility]  [address] [address]  [phone]  www.[facility]  [address] [place] [address]  [phone]  [email or web address].[facility]  [address] [place] [address]  [phone]  www.[email or web address]  [address]  [place] [address]  [phone]  www.[facility].[place].[facility]  [address]  [place] [address]  [phone]  [email or web address]  [address] [address]  [phone]  [email or web address]

## 2020-07-20 NOTE — NURSING NOTE
Zulay Rossi's goals for this visit include:   Chief Complaint   Patient presents with     Consult     right breast hematoma       She requests these members of her care team be copied on today's visit information: Yes    PCP: Dania Munoz    Referring Provider:  No referring provider defined for this encounter.    There were no vitals taken for this visit.    Do you need any medication refills at today's visit? No    Vicki De La Paz LPN
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
## 2020-07-20 NOTE — TELEPHONE ENCOUNTER
Dania Addend the form. I have faxed this to Amina De La Paz @ [phone].Susan Ortega MA/PIERCE

## 2020-07-23 DIAGNOSIS — D64.9 ANEMIA, UNSPECIFIED TYPE: ICD-10-CM

## 2020-07-23 DIAGNOSIS — R79.89 ELEVATED FERRITIN: Primary | ICD-10-CM

## 2020-07-23 DIAGNOSIS — Z79.4 TYPE 2 DIABETES MELLITUS WITH HYPERGLYCEMIA, WITH LONG-TERM CURRENT USE OF INSULIN (H): ICD-10-CM

## 2020-07-23 DIAGNOSIS — E11.65 TYPE 2 DIABETES MELLITUS WITH HYPERGLYCEMIA, WITH LONG-TERM CURRENT USE OF INSULIN (H): ICD-10-CM

## 2020-07-23 LAB
ERYTHROCYTE [DISTWIDTH] IN BLOOD BY AUTOMATED COUNT: 11.2 % (ref 10–15)
FERRITIN SERPL-MCNC: 2876 NG/ML (ref 12–150)
FOLATE SERPL-MCNC: 2.2 NG/ML
HBA1C MFR BLD: 7.9 % (ref 0–5.6)
HCT VFR BLD AUTO: 36.4 % (ref 35–47)
HGB BLD-MCNC: 12.3 G/DL (ref 11.7–15.7)
IRON SATN MFR SERPL: 99 % (ref 15–46)
IRON SERPL-MCNC: 167 UG/DL (ref 35–180)
MCH RBC QN AUTO: 38.1 PG (ref 26.5–33)
MCHC RBC AUTO-ENTMCNC: 33.8 G/DL (ref 31.5–36.5)
MCV RBC AUTO: 113 FL (ref 78–100)
PLATELET # BLD AUTO: 149 10E9/L (ref 150–450)
RBC # BLD AUTO: 3.23 10E12/L (ref 3.8–5.2)
TIBC SERPL-MCNC: 168 UG/DL (ref 240–430)
VIT B12 SERPL-MCNC: 724 PG/ML (ref 193–986)
WBC # BLD AUTO: 5.1 10E9/L (ref 4–11)

## 2020-07-23 PROCEDURE — 83036 HEMOGLOBIN GLYCOSYLATED A1C: CPT | Performed by: PHYSICIAN ASSISTANT

## 2020-07-23 PROCEDURE — 83540 ASSAY OF IRON: CPT | Performed by: PHYSICIAN ASSISTANT

## 2020-07-23 PROCEDURE — 84466 ASSAY OF TRANSFERRIN: CPT | Performed by: PHYSICIAN ASSISTANT

## 2020-07-23 PROCEDURE — 82746 ASSAY OF FOLIC ACID SERUM: CPT | Performed by: PHYSICIAN ASSISTANT

## 2020-07-23 PROCEDURE — 36415 COLL VENOUS BLD VENIPUNCTURE: CPT | Performed by: PHYSICIAN ASSISTANT

## 2020-07-23 PROCEDURE — 85027 COMPLETE CBC AUTOMATED: CPT | Performed by: PHYSICIAN ASSISTANT

## 2020-07-23 PROCEDURE — 82607 VITAMIN B-12: CPT | Performed by: PHYSICIAN ASSISTANT

## 2020-07-23 PROCEDURE — 82728 ASSAY OF FERRITIN: CPT | Performed by: PHYSICIAN ASSISTANT

## 2020-07-23 PROCEDURE — 83550 IRON BINDING TEST: CPT | Performed by: PHYSICIAN ASSISTANT

## 2020-07-27 ENCOUNTER — MYC MEDICAL ADVICE (OUTPATIENT)
Dept: FAMILY MEDICINE | Facility: CLINIC | Age: 40
End: 2020-07-27

## 2020-07-28 DIAGNOSIS — R79.89 ELEVATED FERRITIN: Primary | ICD-10-CM

## 2020-07-28 LAB — TRANSFERRIN SERPL-MCNC: 137 MG/DL (ref 210–360)

## 2020-07-28 NOTE — RESULT ENCOUNTER NOTE
Efrain Biswas,    I added another lab which I am waiting on to help determine more information about your elevated ferritin.  Your folate level is low which can cause anemia also (although your hemoglobin was normal currently), do you take any folate supplementation?  If not we will need to have you start this over-the-counter 400 mcg daily and recheck your folate levels in 1 month. We will recheck your ferritin then also. b12 level was normal.   I will follow up more once I get the rest of your labs.     Sincerely,  Dania Munoz PA-C

## 2020-07-30 NOTE — RESULT ENCOUNTER NOTE
Efrain Biswas,       Your recent test results are attached, if you have any questions or concerns please feel free to contact me via e-mail or call [phone]. Gavino Biswas. Your transferrin is not elevated, therefore I would interpret your elevated ferritin as an inflammatory marker and also you can get this with fatty liver disease which you have from elevated triglycerides. When was the last time you saw gastroenterology as an outpatient?  My last report from then was awhile ago.        It was a pleasure to see you at your recent office visit.      Sincerely,  Dania Munoz PA-C

## 2020-08-04 ENCOUNTER — MYC MEDICAL ADVICE (OUTPATIENT)
Dept: FAMILY MEDICINE | Facility: CLINIC | Age: 40
End: 2020-08-04

## 2020-08-05 ENCOUNTER — VIRTUAL VISIT (OUTPATIENT)
Dept: ENDOCRINOLOGY | Facility: CLINIC | Age: 40
End: 2020-08-05
Payer: COMMERCIAL

## 2020-08-05 DIAGNOSIS — I10 BENIGN ESSENTIAL HYPERTENSION: ICD-10-CM

## 2020-08-05 DIAGNOSIS — E11.65 TYPE 2 DIABETES MELLITUS WITH HYPERGLYCEMIA, WITH LONG-TERM CURRENT USE OF INSULIN (H): Primary | ICD-10-CM

## 2020-08-05 DIAGNOSIS — Z79.4 TYPE 2 DIABETES MELLITUS WITH HYPERGLYCEMIA, WITH LONG-TERM CURRENT USE OF INSULIN (H): Primary | ICD-10-CM

## 2020-08-05 PROCEDURE — 99214 OFFICE O/P EST MOD 30 MIN: CPT | Mod: 95 | Performed by: INTERNAL MEDICINE

## 2020-08-05 RX ORDER — FLASH GLUCOSE SENSOR
1 KIT MISCELLANEOUS
Qty: 2 EACH | Refills: 11 | Status: SHIPPED | OUTPATIENT
Start: 2020-08-05

## 2020-08-05 RX ORDER — FLASH GLUCOSE SCANNING READER
1 EACH MISCELLANEOUS DAILY
Qty: 1 DEVICE | Refills: 0 | Status: SHIPPED | OUTPATIENT
Start: 2020-08-05 | End: 2021-05-26
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
## 2020-08-08 ENCOUNTER — TRANSFERRED RECORDS (OUTPATIENT)
Dept: HEALTH INFORMATION MANAGEMENT | Facility: CLINIC | Age: 40
End: 2020-08-08

## 2020-08-12 ENCOUNTER — MYC MEDICAL ADVICE (OUTPATIENT)
Dept: SURGERY | Facility: CLINIC | Age: 40
End: 2020-08-12

## 2020-08-12 DIAGNOSIS — H49.21 ABDUCENS (SIXTH) NERVE PALSY, RIGHT: ICD-10-CM

## 2020-08-12 DIAGNOSIS — H53.2 DIPLOPIA: Primary | ICD-10-CM

## 2020-08-13 ENCOUNTER — PATIENT OUTREACH (OUTPATIENT)
Dept: CARE COORDINATION | Facility: CLINIC | Age: 40
End: 2020-08-13

## 2020-08-13 NOTE — PROGRESS NOTES
Clinic Care Coordination Contact  [facility]/Voicemail       Clinical Data: CHW Outreach  Outreach attempted x 1.  Left message on patient's voicemail with call back information and requested return call.    Plan: CHW will try to reach patient again in 1-2 business days.    Sandy MEREDITH Community Health Worker  Clinic Care Coordination  [facility] : [place] [facility] [place] [place]  Phone: [phone]        Reason for Referral: Care Transition: Inpatient to outpatient

## 2020-08-14 ENCOUNTER — PATIENT OUTREACH (OUTPATIENT)
Dept: NURSING | Facility: CLINIC | Age: 40
End: 2020-08-14
Payer: COMMERCIAL

## 2020-08-14 NOTE — PROGRESS NOTES
Clinic Care Coordination Contact  Community Health Worker Initial Outreach       CHW Additional Questions  If ED/Hospital discharge, follow-up appointment scheduled as recommended?: No  Patient agreeable to assistance with scheduling?: No  Patient declined (specify): Patient is waiting for simon to call first.  Medication changes made following ED/Hospital discharge?: No  MyChart active?: Yes  Patient sent Social Determinants of Health questionnaire?: No    Patient accepts CC: No, Patient declined CCC services.. Patient will be sent Care Coordination introduction letter for future reference.     The Clinic Community Health Worker spoke with the patient today at the request of the PCP to discuss possible Clinic Care Coordination enrollment. The service was described to the patient and immediate needs were discussed. The patient declined enrollment at this time. The PCP is encouraged to refer in the future if the patient's needs change.      Sandy MEREDITH Community Health Worker  Clinic Care Coordination  [facility] : [place] [facility] [place] [place]  Phone: [phone]      Reason for Referral: Care Transition: Inpatient to outpatient

## 2020-08-14 NOTE — LETTER
[facility]ARE [facility]ORDIN[facility]  [address] [address] [facility] [address]    August 14, 2020    Zulay Rossi  [address] [place] [address]      Dear Zulay,    I am a clinic community health worker who works with Dania Munoz PA-C at [facility]. I wanted to thank you for spending the time to talk with me.  Below is a description of clinic care coordination and how I can further assist you.      The clinic care coordination team is made up of a registered nurse,  and community health worker who understand the health care system. The goal of clinic care coordination is to help you manage your health and improve access to the health care system in the most efficient manner. The team can assist you in meeting your health care goals by providing education, coordinating services, strengthening the communication among your providers and supporting you with any resource needs.    Please feel free to contact me at [phone] with any questions or concerns. We are focused on providing you with the highest-quality healthcare experience possible and that all starts with you.     Sincerely,     Sandy MEREDITH, Community Health Worker  Clinic Care Coordination  [facility] : [place], [place] [facility] [place]  Phone: [phone]

## 2020-08-18 NOTE — TELEPHONE ENCOUNTER
sent a routing message asking RN to reach out to pt to have pt follow up with her PCP or whoever is directing her medical team regarding the surgery and that although surgery should be performed for the breast atypia it is not life threatening. RN called pt, no answer. Left general message for pt to call the health clinic back at [phone] and left general message for pt  to check her my chart messages..My chart message also sent to pt ...Shiloh Gonzalez RN

## 2020-08-20 ENCOUNTER — MYC REFILL (OUTPATIENT)
Dept: FAMILY MEDICINE | Facility: CLINIC | Age: 40
End: 2020-08-20

## 2020-08-20 DIAGNOSIS — N64.89 HEMATOMA OF RIGHT BREAST: ICD-10-CM

## 2020-08-20 RX ORDER — OXYCODONE AND ACETAMINOPHEN 5; 325 MG/1; MG/1
1 TABLET ORAL EVERY 8 HOURS PRN
Qty: 10 TABLET | Refills: 0 | Status: SHIPPED | OUTPATIENT
Start: 2020-08-20 | End: 2020-09-22

## 2020-08-20 NOTE — PROGRESS NOTES
Subjective     Zulay Rossi is a 40 year old female who presents to clinic today for the following health issues:    [facility]       Hospital Follow-up Visit:    Hospital/Nursing Home/IP Rehab Facility: [facility]  Date of Admission: 08/08/2020  Date of Discharge: 08/11/2020  Reason(s) for Admission: Neurological issue per pt.      Was your hospitalization related to COVID-19? No   Problems taking medications regularly:  None  Medication changes since discharge: None  Problems adhering to non-medication therapy:  None    Summary of hospitalization:  [facility] discharge summary reviewed  Diagnostic Tests/Treatments reviewed.  Follow up needed: none  Other Healthcare Providers Involved in Patient s Care:         None  Update since discharge: improved.   Post Discharge Medication Reconciliation: discharge medications reconciled, continue medications without change.  Plan of care communicated with patient         Patient admitted for vision changes ultimately diagnosed as 6th nerve palsy.  While in hospital she had what they believe a withdrawal seizure from alcohol.  Her blood alcohol level was normal in the hospital.  Today patient adamantly denies frequent alcohol use, she claims to only have 2 glasses of wine twice a month with friends.  This would not be enough to have a withdrawal seizure.  All of her lab work and vitals during the seizure point towards alcohol withdrawal and they treated her for this.  Patient and I did discuss today that she absolutely cannot have alcohol alcohol given her pancreatitis and liver enzymes/hepatitis and fatty liver.  She cannot even have 1 glass a year we discussed.  She states understanding.She does endorse previously using alcohol more frequently before her diagnosis of pancreatitis years ago.    I offered drug and alcohol counseling referral but patient declined she states she does not drink frequently alcohol.  If The patient is not being honest with me she has unlikely  "to seek treatment that she needs.   She knows she can contact me at any time.  She declines referral to therapist.  She is off of work for 6 weeks as she has a lumpectomy that needs to get done as well and is dealing with several medical issues.  I did have her sign for records so I can read the note from GI yesterday.     her sugars decreased from 200-300 per patient to 100s and she feels the seizure was from that.   GGT was very elevated in hospital.  This points towards more alcohol use and patient is admitting to.  Was supposed to f/u with gastroenterology --had virtual visit yesterday with them. Has an appointment for labs and liver enzyme testing then also.    Was told to avoid ibuprofen due to pancreatitis and tylenol due to liver.  She has pain in her neck and head from where she hit her head after falling during procedure in the hospital.  She had a CT scan that was negative for bleed.     Has lumpectomy for her breast. Has a preop on Friday.  Not scheduled yet.       Was given shonda so she can check her sugars more often. Diabetes due to poor pancreas function. Did see endocrine and sees our diabetic educator.       Needs hepatologist and possible biopsy done.   Goes to [facility].       Anemia-no history of this per patient but hg was 9.9 in hospital.  Will get folate and other labs today.   Had elevated ferritin and normal hg last month. Transferrin was normal so her elevated ferritin is likely more from inflammation/chronic illness.      Low magnesium and slightly low potassium-could be from alcohol use or poor diet. She states she has not been eating as much the past months. Her bmi is now 19.       Copied from care everywhere:  \"Principal Problem:  Alcohol withdrawal seizure (HC)  Active Problems:  Depression  Hypertriglyceridemia  Hypertension  Tobacco dependence  Chronic pain syndrome  Chronic pancreatitis (HC)  Type 1 diabetes mellitus with ketoacidosis without coma (HC)  Norma (sixth) " nerve palsy  Alcoholic hepatitis with ascites  Alcohol withdrawal (HC)    BRIEF HOSPITAL COURSE: This 40 y.o. female w/ DMI and chronic pancreatitis presented w/ double vision. She was noted to have a partial abducens nerve palsy which was likely causing her symptoms. There was no evidence for stroke and her vision issues/nerve palsy improved quickly. Work up also revealed UTI and she was started on antibiotics. On HD2, she suffered a witnessed seizure, she became tachycardic and blood pressure was markedly elevated-- all suggestive of alcohol withdrawal. She denied a history of alcohol abuse to all providers but she did improve markedly on MINDS protocol. In addition, LFTs were abnormal in a pattern suggestive of hepatocellular injury from alcohol and imaging showed hepatic steatosis. Neurology, GI and IM all felt that her issues are best explained by alcohol use w/ subsequent withdrawal. Urine markers for alcohol use are pending at discharge and she is referred to hepatology clinic at [facility]. A liver biopsy may need to be considered if the picture remains unclear, however, LFTs are trending in the right direction at discharge. She completed antibiotics for UTI in house. Med changes are listed below with many medications being stopped due to liver dysfunction and QTc prolongation. These may be able to be restarted in the future pending recheck of labs and EKG.     PROCEDURES PERFORMED DURING HOSPITALIZATION:     EEG: no epileptiform activity noted         STOP taking these medicines   amitriptyline 25 mg tablet  Commonly known as: ELAVIL    amLODIPine 10 mg tablet  Commonly known as: NORVASC    atorvastatin 20 mg tablet  Commonly known as: LIPITOR    fenofibrate micronized 200 mg capsule  Commonly known as: LOFIBRA    losartan 100 mg tablet  Commonly known as: COZAAR    ondansetron 8 mg tablet  Commonly known as: ZOFRAN    oxyCODONE-acetaminophen (5-325 mg) 5-325 mg per tablet  Commonly known as: PERCOCET    ZyrTEC  10 mg tablet  Generic drug: cetirizine      CONTINUE taking these medicines   Instructions   blood-glucose meter  For diagnoses: Secondary diabetes (HC)  Dispense meter, test strips, lancets covered by pt ins. E11.9 NIDDM type II - Test 2 times/day. Reason: New diabetes    * Creon 24,000-76,000 -120,000 unit Cpdr Delayed-Release capsule  Generic drug: lipase-protease-amylase  Take 1 capsule by mouth 3 times daily with meals.    * Creon 24,000-76,000 -120,000 unit Cpdr Delayed-Release capsule  Generic drug: lipase-protease-amylase  Take 1 capsule by mouth each time if needed for Supplement w/ Snack.    icosapent ethyL 1 gram Cap  For diagnoses: Hypertriglyceridemia  Take 2 g by mouth 2 times daily.    insulin glargine 100 unit/mL (3 mL) pen  Commonly known as: BASAGLAR KWIKPEN  Inject 6 Units subcutaneous before bedtime. Product desired:BASAGLAR    metoclopramide HCl 10 mg tablet  For diagnoses: Necrotizing pancreatitis  Commonly known as: REGLAN  Take 1 tablet by mouth every 6 hours if needed for Nausea/Vomiting.    montelukast 10 mg tablet  Commonly known as: SINGULAIR  Take 10 mg by mouth every morning.    NovoLOG Flexpen U-100 Insulin 100 unit/mL (3 mL) solution for injection  Generic drug: insulin aspart U-100  Inject subcutaneous. 1 unit for every 15 carbs as directed    sertraline 100 mg tablet  Commonly known as: ZOLOFT  Take 100 mg by mouth once daily.    tiZANidine 2 mg tablet  Commonly known as: ZANAFLEX  Take 2 mg by mouth 3 times daily if needed for Muscle Spasm.    Tylenol Extra Strength 500 mg tablet  Generic drug: acetaminophen  Take 1,000 mg by mouth every 6 hours if needed. Max acetaminophen dose: 4000mg in 24 hrs.    * Ventolin HFA 90 mcg/actuation inhaler  Generic drug: albuterol HFA  Inhale 2 Puffs by mouth 4 times daily if needed.    * albuterol 0.083 % neb solution  Commonly known as: PROVENTIL  Inhale 2.5 mg via a nebulizer every 6 hours if needed.      After Discharge Orders and Instructions  "  Additional information about your medicines:   Some medications that you were taking at home have been stopped. For some of them, this is because they are not recommended with liver dysfunction. You may be able to restart them if your liver tests improve. For others, it is because of complications that can arise in the setting of abnormal electrolytes. Your primary care physician can help you determine when it's safe to restart these medications.       Specialty follow-up: GI    Total time spent for discharge on date of discharge: 50 minutes  I saw the patient on the date of discharge.\"    ---Ariadna Alejandre MD  [facility]  Page me through AMION from 7am to 7pm  Page Cross Cover Physician through AMION from 7pm to 7am        amlodipine-stopped due to elevated liver enzymes/liver problems    Losartan-stopped due to liver enzymes being so elevated    amitrypline- stopped due to prolonged qt.  Doing ok off of this. Was on for sleep/belly pain previously.     zofran-stopped due long qt on ekg    atorvostatin and fenofibrate-stopped due to elevated liver enzymes. However she has a h/o extremely elevated TGs so this is concerning and will need to be monitored closely.     Zyrtec-due to liver tests. Can take 5 mg with elevated liver tests per up to date.     No chest pain or shortness of breath. Pulse is elevated today but prolonged QT interveral is not shown.         Review of Systems   Constitutional, HEENT, cardiovascular, pulmonary, GI, , musculoskeletal, neuro, skin, endocrine and psych systems are negative, except as otherwise noted.      Objective    /86   Pulse 100   Temp 98.5  F (36.9  C) (Tympanic)   Resp 14   Wt 48.1 kg (106 lb)   SpO2 98%   Breastfeeding No   BMI 19.39 kg/m    Body mass index is 19.39 kg/m .  Physical Exam   GENERAL: alert and no distress, thin  NECK: : some ecchymosis present from her fall, per patient is improving  RESP: lungs clear to auscultation - no rales, rhonchi " or wheezes  CV: {:mild tachyardia, otherwise normal  MS: no gross musculoskeletal defects noted, no edema  NEURO: Normal strength and tone, mentation intact and speech normal  PSYCH: mentation appears normal, affect normal/bright  Abdomen: nontender            Assessment & Plan     Type 2 diabetes mellitus with hyperglycemia, with long-term current use of insulin (H)  Continue with endocrine and educator  Getting another a1c due to upcoming pre-op  - insulin aspart (NOVOLOG FLEXPEN) 100 UNIT/ML pen; Inject 0-2 units at breakfast and 2 units with lunch and dinner.  Use 2 units with each dose to prime. Max TDD=12 units  - Hemoglobin A1c    History of seizure  See HPI, suspect alcohol withdraw el but per patient history this would not be possible. Will continue to ask and monitor patient. Is told to 100 percent abstain from alcohol.     Other chronic pain  QT interveral normal today  We should continue to check this if she re-starts her TCA again future but hasnt' needed it now  Ok to use zofran PRN, if used daily then would need to repeat ekg again soon. Per patient she usually uses reglan instead now due to insurance coverage    - EKG 12-lead complete w/read - Clinics    Elevated liver enzymes  Fatty liver, hepatitis, suspect alcohol use however see hpi patient denies more than 4 drinks/month    - Comprehensive metabolic panel    Anemia, unspecified type  Needs further testing/workup, may be due to recent poor diet and chronic disease as well and/or alcohol use  - CBC with platelets and differential  - Ferritin  - Iron and iron binding capacity  - Vitamin B12  - Folate    Prolonged Q-T interval on ECG      Hepatitis  Continue with gastroenterology  She signed for records as I need to see the note from yesterday with them  Goes to [facility]    High TGs-off all cholesterol medications now. Once her labs are back I will contact her gastroenterology doctor to form a plan on what to do with these  medications, as without them I am nervous her TGs will go ugo high again. Also consider going back to lipid specialist (she has seen before). Is on fish oil Rx.     Recheck Friday at preop.       Dania Munoz PA-C  [facility]

## 2020-08-20 NOTE — TELEPHONE ENCOUNTER
Controlled Substance Refill Request for percocet  Problem List Complete:  Yes     Patient is followed by Dania Munoz PA-C for ongoing prescription of pain medication.  All refills should only be approved by this provider, or covering partner.     Medication(s): percocet 5 mg tabs.  Takes 1-2 every 4 hours after pancreatitis attack only.  Does not take daily every month. Takes after acute episodes of pancreatitis which are becoming more frequent.   Maximum quantity per month: 12 per month per pain clinic recommendations and only after a flare in hospital  Clinic visit frequency required: Q 3 months      Controlled substance agreement:        Encounter-Level CSA:      There are no encounter-level csa.          Pain Clinic evaluation in the past: No     DIRE Total Score(s):  No flowsheet data found.     Last [facility] website verification:  done on 4-18-17   [email or web address]      checked in past 3 months?  Yes 7/10/2020   Barbara STEELEN, RN

## 2020-08-24 ENCOUNTER — TRANSFERRED RECORDS (OUTPATIENT)
Dept: HEALTH INFORMATION MANAGEMENT | Facility: CLINIC | Age: 40
End: 2020-08-24

## 2020-08-24 ENCOUNTER — MEDICAL CORRESPONDENCE (OUTPATIENT)
Dept: HEALTH INFORMATION MANAGEMENT | Facility: CLINIC | Age: 40
End: 2020-08-24

## 2020-08-25 ENCOUNTER — OFFICE VISIT (OUTPATIENT)
Dept: FAMILY MEDICINE | Facility: CLINIC | Age: 40
End: 2020-08-25
Payer: COMMERCIAL

## 2020-08-25 VITALS
SYSTOLIC BLOOD PRESSURE: 116 MMHG | DIASTOLIC BLOOD PRESSURE: 86 MMHG | OXYGEN SATURATION: 98 % | TEMPERATURE: 98.5 F | HEART RATE: 100 BPM | WEIGHT: 106 LBS | RESPIRATION RATE: 14 BRPM | BODY MASS INDEX: 19.39 KG/M2

## 2020-08-25 DIAGNOSIS — Z87.898 HISTORY OF SEIZURE: Primary | ICD-10-CM

## 2020-08-25 DIAGNOSIS — Z79.4 TYPE 2 DIABETES MELLITUS WITH HYPERGLYCEMIA, WITH LONG-TERM CURRENT USE OF INSULIN (H): ICD-10-CM

## 2020-08-25 DIAGNOSIS — K75.9 HEPATITIS: ICD-10-CM

## 2020-08-25 DIAGNOSIS — E11.65 TYPE 2 DIABETES MELLITUS WITH HYPERGLYCEMIA, WITH LONG-TERM CURRENT USE OF INSULIN (H): ICD-10-CM

## 2020-08-25 DIAGNOSIS — D69.6 THROMBOCYTOPENIA (H): ICD-10-CM

## 2020-08-25 DIAGNOSIS — R94.31 PROLONGED Q-T INTERVAL ON ECG: ICD-10-CM

## 2020-08-25 DIAGNOSIS — E53.8 FOLATE DEFICIENCY: ICD-10-CM

## 2020-08-25 DIAGNOSIS — G89.29 OTHER CHRONIC PAIN: ICD-10-CM

## 2020-08-25 DIAGNOSIS — D64.9 ANEMIA, UNSPECIFIED TYPE: ICD-10-CM

## 2020-08-25 DIAGNOSIS — R74.8 ELEVATED LIVER ENZYMES: ICD-10-CM

## 2020-08-25 LAB
ALBUMIN SERPL-MCNC: 3.4 G/DL (ref 3.4–5)
ALP SERPL-CCNC: 152 U/L (ref 40–150)
ALT SERPL W P-5'-P-CCNC: 94 U/L (ref 0–50)
ANION GAP SERPL CALCULATED.3IONS-SCNC: 9 MMOL/L (ref 3–14)
AST SERPL W P-5'-P-CCNC: 424 U/L (ref 0–45)
BASOPHILS # BLD AUTO: 0 10E9/L (ref 0–0.2)
BASOPHILS NFR BLD AUTO: 0.6 %
BILIRUB SERPL-MCNC: 0.7 MG/DL (ref 0.2–1.3)
BUN SERPL-MCNC: 7 MG/DL (ref 7–30)
CALCIUM SERPL-MCNC: 7.8 MG/DL (ref 8.5–10.1)
CHLORIDE SERPL-SCNC: 96 MMOL/L (ref 94–109)
CO2 SERPL-SCNC: 28 MMOL/L (ref 20–32)
CREAT SERPL-MCNC: 0.55 MG/DL (ref 0.52–1.04)
DIFFERENTIAL METHOD BLD: ABNORMAL
EOSINOPHIL # BLD AUTO: 0.1 10E9/L (ref 0–0.7)
EOSINOPHIL NFR BLD AUTO: 2.1 %
ERYTHROCYTE [DISTWIDTH] IN BLOOD BY AUTOMATED COUNT: 11.2 % (ref 10–15)
FOLATE SERPL-MCNC: 2.5 NG/ML
GFR SERPL CREATININE-BSD FRML MDRD: >90 ML/MIN/{1.73_M2}
GLUCOSE SERPL-MCNC: 404 MG/DL (ref 70–99)
HBA1C MFR BLD: 8.4 % (ref 0–5.6)
HCT VFR BLD AUTO: 36.1 % (ref 35–47)
HGB BLD-MCNC: 12.2 G/DL (ref 11.7–15.7)
IRON SATN MFR SERPL: 97 % (ref 15–46)
IRON SERPL-MCNC: 184 UG/DL (ref 35–180)
LYMPHOCYTES # BLD AUTO: 1.7 10E9/L (ref 0.8–5.3)
LYMPHOCYTES NFR BLD AUTO: 35.6 %
MCH RBC QN AUTO: 38.2 PG (ref 26.5–33)
MCHC RBC AUTO-ENTMCNC: 33.8 G/DL (ref 31.5–36.5)
MCV RBC AUTO: 113 FL (ref 78–100)
MONOCYTES # BLD AUTO: 0.3 10E9/L (ref 0–1.3)
MONOCYTES NFR BLD AUTO: 7.1 %
NEUTROPHILS # BLD AUTO: 2.6 10E9/L (ref 1.6–8.3)
NEUTROPHILS NFR BLD AUTO: 54.6 %
PLATELET # BLD AUTO: 123 10E9/L (ref 150–450)
POTASSIUM SERPL-SCNC: 4.1 MMOL/L (ref 3.4–5.3)
PROT SERPL-MCNC: 8.2 G/DL (ref 6.8–8.8)
RBC # BLD AUTO: 3.19 10E12/L (ref 3.8–5.2)
SODIUM SERPL-SCNC: 133 MMOL/L (ref 133–144)
TIBC SERPL-MCNC: 190 UG/DL (ref 240–430)
VIT B12 SERPL-MCNC: 741 PG/ML (ref 193–986)
WBC # BLD AUTO: 4.8 10E9/L (ref 4–11)

## 2020-08-25 PROCEDURE — 80053 COMPREHEN METABOLIC PANEL: CPT | Performed by: PHYSICIAN ASSISTANT

## 2020-08-25 PROCEDURE — 82728 ASSAY OF FERRITIN: CPT | Performed by: PHYSICIAN ASSISTANT

## 2020-08-25 PROCEDURE — 82373 ASSAY C-D TRANSFER MEASURE: CPT | Mod: 90 | Performed by: PHYSICIAN ASSISTANT

## 2020-08-25 PROCEDURE — 83036 HEMOGLOBIN GLYCOSYLATED A1C: CPT | Performed by: PHYSICIAN ASSISTANT

## 2020-08-25 PROCEDURE — 93000 ELECTROCARDIOGRAM COMPLETE: CPT | Performed by: PHYSICIAN ASSISTANT

## 2020-08-25 PROCEDURE — 82607 VITAMIN B-12: CPT | Performed by: PHYSICIAN ASSISTANT

## 2020-08-25 PROCEDURE — 83550 IRON BINDING TEST: CPT | Performed by: PHYSICIAN ASSISTANT

## 2020-08-25 PROCEDURE — 82746 ASSAY OF FOLIC ACID SERUM: CPT | Performed by: PHYSICIAN ASSISTANT

## 2020-08-25 PROCEDURE — 83540 ASSAY OF IRON: CPT | Performed by: PHYSICIAN ASSISTANT

## 2020-08-25 PROCEDURE — 85025 COMPLETE CBC W/AUTO DIFF WBC: CPT | Performed by: PHYSICIAN ASSISTANT

## 2020-08-25 PROCEDURE — 36415 COLL VENOUS BLD VENIPUNCTURE: CPT | Performed by: PHYSICIAN ASSISTANT

## 2020-08-25 PROCEDURE — 99215 OFFICE O/P EST HI 40 MIN: CPT | Performed by: PHYSICIAN ASSISTANT

## 2020-08-25 PROCEDURE — 99000 SPECIMEN HANDLING OFFICE-LAB: CPT | Performed by: PHYSICIAN ASSISTANT

## 2020-08-25 RX ORDER — INSULIN ASPART 100 [IU]/ML
INJECTION, SOLUTION INTRAVENOUS; SUBCUTANEOUS
Qty: 9 ML | Refills: 1 | Status: SHIPPED | OUTPATIENT
Start: 2020-08-25 | End: 2020-10-05

## 2020-08-25 RX ORDER — ICOSAPENT ETHYL 1 G/1
2 CAPSULE ORAL 2 TIMES DAILY
Qty: 120 CAPSULE | Refills: 0 | COMMUNITY
Start: 2020-08-25 | End: 2020-10-07

## 2020-08-25 NOTE — PATIENT INSTRUCTIONS
"Hold novolog day of surgery  Take 80 percent of your usual basaglar which would be 6 units the morning of surgery    Hold losartan the morning of surgery    Please return fasting for cholesterol and diabetes screening, you can make a lab only appointment for this.  No food or drink other than water for 10 hours.    Hold icosapent (fish oil) at least 7 days prior to surgery    Other medications take with small sip of water in the morning of surgery      Preparing for Your Surgery  Getting started  A surgery nurse will call you to review your health history and instructions. They will give you an arrival time based on your scheduled surgery time.  Please be ready to share the following:    Your doctor's clinic name and phone number    Your medical, surgical and anesthesia history    A list of allergies and sensitivities    A list of medicines, including herbal treatments and over-the-counter drugs    Whether the patient has a legal guardian (ask how to send us the papers in advance)  If your child is having surgery, please ask for a copy of Preparing for Your Child's Surgery.    Preparing for surgery    Within 30 days of surgery: Have an exam at your family clinic (primary care clinic), or go to a pre-operative clinic. This exam is called a \"History and Physical,\" or H&P.    At your H&P exam, talk to your care team about all medicines you take. If you need to stop any medicines before surgery, ask when to start taking them again.  ? We do this for your safety. Many medicines can make you bleed too much during surgery. Some change how well surgery (anesthesia) drugs work.    Call your insurance company to see what it will and won't pay for. Ask if they need to pre-approve the surgery. (If no insurance, call [phone].)    Call your surgeon's clinic if there's any change in your health. This includes signs of a cold or flu (sore throat, runny nose, cough, rash, fever). It also includes a scrape or scratch near the " surgery site.    If you have questions on the day of surgery, call your surgery center.  Eating and drinking guidelines  For your safety: Unless your surgeon tells you otherwise, follow the guidelines below.    Eat and drink as usual until 8 hours before surgery. After that, no food or milk.    Drink clear liquids until 2 hours before surgery. These are liquids you can see through, like water, Gatorade and Propel Water. You may also have black coffee and tea (no cream or milk).    Nothing by mouth within 2 hours of surgery. This includes gum, candy and breath mints.    Stop alcohol the midnight before surgery.    If your family clinic tells you to take medicine on the morning of surgery, it's okay to take it with a sip of water.  Preventing infection    Shower or bathe the night before and morning of your surgery. Follow the instructions your clinic gave you. (If no instructions, use regular soap.)    Don't shave or clip hair near your surgery site. This can lead to skin infection.    Don't smoke the morning of surgery. Smoking increases the risk of infection. You may chew nicotine gum up to 2 hours before surgery. A nicotine patch is okay.  ? Note: Some surgeries require you to completely quit smoking and nicotine. Check with your surgeon.    Your care team will make every effort to keep you safe from infection. We will:  ? Clean our hands often with soap and water (or an alcohol-based hand rub).  ? Clean the skin at your surgery site with a special soap that kills germs. We'll also remove hair from the site as needed.  ? Wear special hair covers, masks, gowns and gloves during surgery.  ? Give antibiotic medicine, if prescribed. Not all surgeries need antibiotics.  What to bring on the day of surgery    Photo ID and insurance card    Copy of your health care directive, if you have one    Glasses and hearing aides (bring cases)  ? You can't wear contacts during surgery    Inhaler and eye drops, if you use them  (tell us about these when you arrive)    CPAP machine or breathing device, if you use them    A few personal items, if spending the night    If you have . . .  ? A pacemaker or ICD (cardiac defibrillator): Bring the ID card.  ? An implanted stimulator: Bring the remote control.  ? A legal guardian: Bring a copy of the certified (court-stamped) guardianship papers.  Please remove any jewelry, including body piercings. Leave jewelry and other valuables at home.  If you're going home the day of surgery  Important: If you don't follow the rules below, we must cancel your surgery.     Arrange for someone to drive you home after surgery. You may not drive, take a taxi or take public transportation by yourself (unless you'll have local anesthesia only).    Arrange for a responsible adult to stay with you overnight. If you don't, we may keep you in the hospital overnight, and you may need to pay the costs yourself.  Questions?   If you have any questions for your care team, list them here: _________________________________________________________________________________________________________________________________________________________________________________________________________________________________________________________________________________________________________________________  For informational purposes only. Not to replace the advice of your health care provider. Copyright   2701-3140 [employer] [facility]. All rights reserved. Clinically reviewed by Gabi Ellis MD. [employer] [id number] - REV 07/19.

## 2020-08-25 NOTE — PROGRESS NOTES
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 defibrillator): Bring the ID card.  ? An implanted stimulator: Bring the remote control.  ? A legal guardian: Bring a copy of the certified (court-stamped) guardianship papers.  Please remove any jewelry, including body piercings. Leave jewelry and other valuables at home.  If you're going home the day of surgery  Important: If you don't follow the rules below, we must cancel your surgery.     Arrange for someone to drive you home after surgery. You may not drive, take a taxi or take public transportation by yourself (unless you'll have local anesthesia only).    Arrange for a responsible adult to stay with you overnight. If you don't, we may keep you in the hospital overnight, and you may need to pay the costs yourself.  Questions?   If you have any questions for your care team, list them here: _________________________________________________________________________________________________________________________________________________________________________________________________________________________________________________________________________________________________________________________  For informational purposes only. Not to replace the advice of your health care provider. Copyright   7808-7543 [facility]. All rights reserved. Clinically reviewed by Gabi  MD Rhonda. [employer] [id number] - REV 07/19.

## 2020-08-26 LAB — FERRITIN SERPL-MCNC: 2897 NG/ML (ref 12–150)

## 2020-08-27 ENCOUNTER — TELEPHONE (OUTPATIENT)
Dept: FAMILY MEDICINE | Facility: CLINIC | Age: 40
End: 2020-08-27

## 2020-08-27 ENCOUNTER — MYC MEDICAL ADVICE (OUTPATIENT)
Dept: SURGERY | Facility: CLINIC | Age: 40
End: 2020-08-27

## 2020-08-27 PROBLEM — Z79.4 TYPE 2 DIABETES MELLITUS WITH HYPERGLYCEMIA, WITH LONG-TERM CURRENT USE OF INSULIN (H): Status: RESOLVED | Noted: 2019-01-03 | Resolved: 2020-08-27

## 2020-08-27 PROBLEM — E10.9 TYPE 1 DIABETES MELLITUS WITHOUT COMPLICATION (H): Status: ACTIVE | Noted: 2020-08-27

## 2020-08-27 PROBLEM — E53.8 FOLATE DEFICIENCY: Status: ACTIVE | Noted: 2020-08-27

## 2020-08-27 PROBLEM — E11.65 TYPE 2 DIABETES MELLITUS WITH HYPERGLYCEMIA, WITH LONG-TERM CURRENT USE OF INSULIN (H): Status: RESOLVED | Noted: 2019-01-03 | Resolved: 2020-08-27

## 2020-08-27 RX ORDER — FOLIC ACID 1 MG/1
1 TABLET ORAL DAILY
Qty: 90 TABLET | Refills: 1 | Status: SHIPPED | OUTPATIENT
Start: 2020-08-27 | End: 2022-01-05

## 2020-08-27 NOTE — TELEPHONE ENCOUNTER
Pt notified of provider message as written.  Pt verbalized good understanding.    Pt was out of her insulin for one week.  She is taking again.    Pt said she stopped using the shonda testing yesterday, it ended, she was having trouble affording the supplies.  She went back to the finger stick meter and has supplies now.     Ne does not have an appointment with endocrine but will schedule one.    She has lab appointment for GI on Aug 31 and appointment with GI provider in Oct.    She did have an UTI in hospital and is wondering if this can be checked at ov, advised to discuss at ov.    To provider as fyi.  Barbara STEELEN, RN    Barbara STEELEN, RN

## 2020-08-27 NOTE — TELEPHONE ENCOUNTER
Dania Munoz PA-C sent to [facility]OR[facility]me               PLEASE CALL PATIENT:   Dear Zulay,       It was a pleasure to see you at your recent office visit.  Your test results are listed below.  Ferritin still elevated as before. b12 is normal she does not need b12 supplementation. Folate is low she needs folate supplementation. prescription sent. Recheck of hemoglobin 1 month to determine if need to increase dose of this.  Blood sugar is very elevated at 404 and her a1c shows WORSENING of her sugars being4 a1c was 8.4., not improvement which she noted at our visit. I wonder if her glucose monitor is working properly. Does she have an upcoming appointment with endocrine? If not she needs to schedule this. We can discuss medication changes tomorrow. Liver enzymes are still very high ast is 424. If she comes fasting tomorrow we can recheck her cholesterol. I'm still waiting on notes from her gastroenterology doctor we called to request them again. Platelets have decreased more very likely due to her liver disease. White blood cell count normal. Hemoglobin normal at 12.2.         If you have any questions or concerns, please call the clinic at [phone].     Sincerely,   Dania Munoz PA-C

## 2020-08-27 NOTE — RESULT ENCOUNTER NOTE
PLEASE CALL PATIENT:  Dear Zulay,      It was a pleasure to see you at your recent office visit.  Your test results are listed below.  Ferritin still elevated as before. b12 is normal she does not need b12 supplementation. Folate is low she needs folate supplementation. prescription sent. Recheck of hemoglobin 1 month to determine if need to increase dose of this.  Blood sugar is very elevated at 404 and her a1c shows WORSENING of her sugars being4 a1c was 8.4., not improvement which she noted at our visit. I wonder if her glucose monitor is working properly. Does she have an upcoming appointment with endocrine? If not she needs to schedule this. We can discuss medication changes tomorrow. Liver enzymes are still very high ast is 424. If she comes fasting tomorrow we can recheck her cholesterol. I'm still waiting on notes from her gastroenterology doctor we called to request them again. Platelets have decreased more very likely due to her liver disease. White blood cell count normal. Hemoglobin normal at 12.2.         If you have any questions or concerns, please call the clinic at [phone].    Sincerely,  Dania Munoz PA-C

## 2020-08-28 ENCOUNTER — OFFICE VISIT (OUTPATIENT)
Dept: FAMILY MEDICINE | Facility: CLINIC | Age: 40
End: 2020-08-28
Payer: COMMERCIAL

## 2020-08-28 ENCOUNTER — TELEPHONE (OUTPATIENT)
Dept: ONCOLOGY | Facility: CLINIC | Age: 40
End: 2020-08-28

## 2020-08-28 ENCOUNTER — TELEPHONE (OUTPATIENT)
Dept: FAMILY MEDICINE | Facility: CLINIC | Age: 40
End: 2020-08-28

## 2020-08-28 VITALS
BODY MASS INDEX: 19.2 KG/M2 | SYSTOLIC BLOOD PRESSURE: 124 MMHG | WEIGHT: 105 LBS | RESPIRATION RATE: 16 BRPM | HEART RATE: 105 BPM | OXYGEN SATURATION: 100 % | TEMPERATURE: 97.9 F | DIASTOLIC BLOOD PRESSURE: 84 MMHG

## 2020-08-28 DIAGNOSIS — K86.1 CHRONIC PANCREATITIS, UNSPECIFIED PANCREATITIS TYPE (H): ICD-10-CM

## 2020-08-28 DIAGNOSIS — R00.0 TACHYCARDIA: ICD-10-CM

## 2020-08-28 DIAGNOSIS — E11.65 TYPE 2 DIABETES MELLITUS WITH HYPERGLYCEMIA, WITH LONG-TERM CURRENT USE OF INSULIN (H): ICD-10-CM

## 2020-08-28 DIAGNOSIS — R82.90 NONSPECIFIC FINDING ON EXAMINATION OF URINE: ICD-10-CM

## 2020-08-28 DIAGNOSIS — N39.0 URINARY TRACT INFECTION WITHOUT HEMATURIA, SITE UNSPECIFIED: ICD-10-CM

## 2020-08-28 DIAGNOSIS — Z87.440 RECENT URINARY TRACT INFECTION: ICD-10-CM

## 2020-08-28 DIAGNOSIS — K70.31 ALCOHOLIC CIRRHOSIS OF LIVER WITH ASCITES (H): ICD-10-CM

## 2020-08-28 DIAGNOSIS — E10.9 TYPE 1 DIABETES MELLITUS WITHOUT COMPLICATION (H): ICD-10-CM

## 2020-08-28 DIAGNOSIS — I10 BENIGN ESSENTIAL HYPERTENSION: ICD-10-CM

## 2020-08-28 DIAGNOSIS — Z01.818 PREOP GENERAL PHYSICAL EXAM: Primary | ICD-10-CM

## 2020-08-28 DIAGNOSIS — N91.2 ABSENCE OF MENSTRUATION: ICD-10-CM

## 2020-08-28 DIAGNOSIS — Z79.4 TYPE 2 DIABETES MELLITUS WITH HYPERGLYCEMIA, WITH LONG-TERM CURRENT USE OF INSULIN (H): ICD-10-CM

## 2020-08-28 DIAGNOSIS — R74.8 ELEVATED LIVER ENZYMES: ICD-10-CM

## 2020-08-28 DIAGNOSIS — D69.6 THROMBOCYTOPENIA (H): ICD-10-CM

## 2020-08-28 DIAGNOSIS — F10.920 ALCOHOLIC INTOXICATION WITHOUT COMPLICATION (H): ICD-10-CM

## 2020-08-28 DIAGNOSIS — N60.91 ATYPICAL DUCTAL HYPERPLASIA OF RIGHT BREAST: ICD-10-CM

## 2020-08-28 DIAGNOSIS — F39 MOOD DISORDER (H): ICD-10-CM

## 2020-08-28 LAB
ALBUMIN UR-MCNC: NEGATIVE MG/DL
APPEARANCE UR: ABNORMAL
BACTERIA #/AREA URNS HPF: ABNORMAL /HPF
BILIRUB UR QL STRIP: NEGATIVE
COLOR UR AUTO: YELLOW
GLUCOSE UR STRIP-MCNC: NEGATIVE MG/DL
HCG UR QL: NEGATIVE
HGB UR QL STRIP: NEGATIVE
KETONES UR STRIP-MCNC: NEGATIVE MG/DL
LEUKOCYTE ESTERASE UR QL STRIP: NEGATIVE
NITRATE UR QL: POSITIVE
NON-SQ EPI CELLS #/AREA URNS LPF: ABNORMAL /LPF
PH UR STRIP: 6 PH (ref 5–7)
RBC #/AREA URNS AUTO: ABNORMAL /HPF
SOURCE: ABNORMAL
SP GR UR STRIP: 1.01 (ref 1–1.03)
TRANS CELLS #/AREA URNS HPF: ABNORMAL /HPF
UROBILINOGEN UR STRIP-ACNC: 0.2 EU/DL (ref 0.2–1)
WBC #/AREA URNS AUTO: ABNORMAL /HPF

## 2020-08-28 PROCEDURE — 87088 URINE BACTERIA CULTURE: CPT | Performed by: PHYSICIAN ASSISTANT

## 2020-08-28 PROCEDURE — 81025 URINE PREGNANCY TEST: CPT | Performed by: PHYSICIAN ASSISTANT

## 2020-08-28 PROCEDURE — 81001 URINALYSIS AUTO W/SCOPE: CPT | Performed by: PHYSICIAN ASSISTANT

## 2020-08-28 PROCEDURE — 87186 SC STD MICRODIL/AGAR DIL: CPT | Performed by: PHYSICIAN ASSISTANT

## 2020-08-28 PROCEDURE — 99215 OFFICE O/P EST HI 40 MIN: CPT | Performed by: PHYSICIAN ASSISTANT

## 2020-08-28 PROCEDURE — 87086 URINE CULTURE/COLONY COUNT: CPT | Performed by: PHYSICIAN ASSISTANT

## 2020-08-28 RX ORDER — NITROFURANTOIN 25; 75 MG/1; MG/1
100 CAPSULE ORAL 2 TIMES DAILY
Qty: 20 CAPSULE | Refills: 0 | Status: SHIPPED | OUTPATIENT
Start: 2020-08-28 | End: 2020-09-07

## 2020-08-28 RX ORDER — INSULIN GLARGINE 100 [IU]/ML
8 INJECTION, SOLUTION SUBCUTANEOUS AT BEDTIME
Qty: 3 ML | Refills: 1 | COMMUNITY
Start: 2020-08-28 | End: 2020-09-14

## 2020-08-28 NOTE — TELEPHONE ENCOUNTER
Phoned Zulay Rossi in attempt to discuss issues regarding scheduling surgery due to her recent medical admission.     Briefly, she was admitted recently for a seizure and I had asked her to follow up with her PCP to manage her medical issues.  She will need a PAC visit with anesthesia prior to scheduling surgery.     Also her HbA1c is 8.4, which is prohibitively high for a benign breast procedure.  I recommend her surgery be deferred pending improvement in her blood glucose levels.    There was no answer and her voicemail box was full.    Will ask RN care coordinator to reach out to her.    Sarah Cohen MD MSc [facility] FACS    Division of Surgical Oncology  [facility]

## 2020-08-28 NOTE — Clinical Note
Preop from hell. At least for me this is probably a typical patient for you. I spent about 3 hours on this =(  I am sure you will want it changed but hopefully I did ok. Thanks, your humble assistant!

## 2020-08-28 NOTE — TELEPHONE ENCOUNTER
Please call the office of Sarah Cohen MD . Patient saw them in [place] but I believe they work for [facility]. No phone number given. Should be general surgery office at [place] though.     Please notify them that patient has elevated liver enzymes (significant) and therefore the anesthesiologist should be aware of this and dose medications as indicated.   I will also put this on my note.      I also suggest patient have surgery in hospital not outpatient setting due to poor diabetes control  And other co-morbids.      Dania Munoz PA-C

## 2020-08-28 NOTE — TELEPHONE ENCOUNTER
In chart Shiloh Gonzalez RN is RN for Dr. Cohen.  I will forward her the information.    Dania AKINS has done a preop for pt.  We need to get this information to the surgeon and anesthesiologist.  Can you help with this?    Barbara Overton BSN, RN

## 2020-08-28 NOTE — RESULT ENCOUNTER NOTE
PLEASE CALL PATIENT:  Dear Zulay,      It was a pleasure to see you at your recent office visit.  Your test results are listed below.  UA looks positive still. I will culture this. Antibiotic sent to pharmacy. Repeat UA in 7 days this can be lab only I pended future lab. That will make sure its gone.         If you have any questions or concerns, please call the clinic at [phone].    Sincerely,  Dania Munoz PA-C

## 2020-08-29 LAB
BACTERIA SPEC CULT: ABNORMAL
Lab: ABNORMAL
SPECIMEN SOURCE: ABNORMAL

## 2020-08-30 ENCOUNTER — TRANSFERRED RECORDS (OUTPATIENT)
Dept: HEALTH INFORMATION MANAGEMENT | Facility: CLINIC | Age: 40
End: 2020-08-30

## 2020-08-30 LAB
ALT SERPL-CCNC: 69 U/L (ref 8–45)
AST SERPL-CCNC: 338 U/L (ref 5–41)
CREAT SERPL-MCNC: 0.67 MG/DL (ref 0.57–1.11)
GFR SERPL CREATININE-BSD FRML MDRD: >60 ML/MIN/1.73M(2)
GLUCOSE SERPL-MCNC: 224 MG/DL (ref 70–100)
POTASSIUM SERPL-SCNC: 3.1 MMOL/L (ref 3.5–5.1)

## 2020-08-31 ENCOUNTER — MYC MEDICAL ADVICE (OUTPATIENT)
Dept: NURSING | Facility: CLINIC | Age: 40
End: 2020-08-31

## 2020-08-31 ENCOUNTER — TELEPHONE (OUTPATIENT)
Dept: FAMILY MEDICINE | Facility: CLINIC | Age: 40
End: 2020-08-31

## 2020-08-31 PROBLEM — K70.31 ALCOHOLIC CIRRHOSIS OF LIVER WITH ASCITES (H): Status: ACTIVE | Noted: 2020-08-31

## 2020-08-31 PROBLEM — F10.929 ALCOHOL INTOXICATION (H): Status: ACTIVE | Noted: 2020-08-31

## 2020-08-31 NOTE — TELEPHONE ENCOUNTER
Pt cell vm full.  Pt home number disconnected.  Message sent to pt through "[employer].  I requested confirmation back she got it.  Barbara STEELEN, RN

## 2020-08-31 NOTE — TELEPHONE ENCOUNTER
Pt returned RN's phone call and pt states that she received the phone message and also read the [employer] message. RN asked pt if she had questions and pt states that she is wondering how long it would be before she would get another date. RN notified pt that there is no surgery date scheduled but that  advises that pt be medically stable prior to proceeding with surgery. RN also notified pt that RN updated pt's PCP with 's recommendations. Pt verbalized understanding. RN advised pt to reach out with any further questions or concerns. Pt states understanding..Shiloh Gonzalez RN
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
## 2020-08-31 NOTE — RESULT ENCOUNTER NOTE
Efrain Biswas,       Your recent test results are attached, if you have any questions or concerns please feel free to contact me via e-mail or call [phone].  The antibiotic we put you on covers the bacteria that grew in your culture.  If your symptoms worsen or persist, please call clinic.        Sincerely,  Dania Munoz PA-C

## 2020-09-02 LAB — CDT DISIALO/CDT TETRASIALO SERPL: NORMAL

## 2020-09-03 ENCOUNTER — VIRTUAL VISIT (OUTPATIENT)
Dept: FAMILY MEDICINE | Facility: CLINIC | Age: 40
End: 2020-09-03
Payer: COMMERCIAL

## 2020-09-03 ENCOUNTER — TELEPHONE (OUTPATIENT)
Dept: ADDICTION MEDICINE | Facility: CLINIC | Age: 40
End: 2020-09-03

## 2020-09-03 DIAGNOSIS — E10.9 TYPE 1 DIABETES MELLITUS WITHOUT COMPLICATION (H): ICD-10-CM

## 2020-09-03 DIAGNOSIS — G89.4 CHRONIC PAIN SYNDROME: ICD-10-CM

## 2020-09-03 DIAGNOSIS — F10.10 ALCOHOL ABUSE: ICD-10-CM

## 2020-09-03 DIAGNOSIS — F10.920 ALCOHOLIC INTOXICATION WITHOUT COMPLICATION (H): Primary | ICD-10-CM

## 2020-09-03 DIAGNOSIS — Z91.148 CONTROLLED SUBSTANCE AGREEMENT BROKEN: ICD-10-CM

## 2020-09-03 DIAGNOSIS — K70.31 ALCOHOLIC CIRRHOSIS OF LIVER WITH ASCITES (H): ICD-10-CM

## 2020-09-03 DIAGNOSIS — N60.91 ATYPICAL DUCTAL HYPERPLASIA OF RIGHT BREAST: ICD-10-CM

## 2020-09-03 DIAGNOSIS — R74.8 ELEVATED LIVER ENZYMES: ICD-10-CM

## 2020-09-03 DIAGNOSIS — R56.9 SEIZURE (H): ICD-10-CM

## 2020-09-03 PROBLEM — Z79.899 CONTROLLED SUBSTANCE AGREEMENT SIGNED: Status: RESOLVED | Noted: 2017-04-18 | Resolved: 2020-09-03

## 2020-09-03 PROCEDURE — 99214 OFFICE O/P EST MOD 30 MIN: CPT | Mod: GT | Performed by: PHYSICIAN ASSISTANT

## 2020-09-03 NOTE — LETTER
September 16, 2020    Zulay Rossi  [address] [place] [address]    Dear Dania Biswas wanted us to send this information to you, so you can call to scheduled if you change your mind.    Please inform pt that we tried to reach her to get her scheduled with an addiction medicine provider. Please have pt call us back if she is still interested in being seen at [facility]. [phone].       Thank you,   Your [facility] Team/  [phone]

## 2020-09-03 NOTE — TELEPHONE ENCOUNTER
"Please schedule appointment for patient with   Addiction Medicine Provider   For alcohol use disorder    Patient can call our detox line at [phone] to speak with our intake staff and discuss possible admission.       Our staff will relay the following information: Please offer our patient to call [facility] assessment line - [phone]. They can ask for a \"chemical assessment\" or \"rule 25\". This will allow them to discuss possible psychosocial treatment options including individual therapy or any group options including outpatient, intensive outpatient, or residential (inpatient) treatment.     Note routed to PCP - Please feel free to reach out to myself with any specific patient care needs as well. I can help address concerns and/or relay the information to the provider who will be scheduled. No need to reply if consult is sufficient.     Damon Jacques MD    "

## 2020-09-03 NOTE — PATIENT INSTRUCTIONS
Make sure you schedule appointment with endocrine for diabetes so we can get them down before any surgery occurs and see dietician      I will follow up after cholesterol results come back, check this fasting in the next 3 weeks or so, depending on these results and liver tests we may or may not restart lipid medication or have you see lipid specialist if needing a different type of lipid medication that you can tolerate (they have more different kinds they can prescribe)       Schedule with alcohol provider also, under no circumstance should you be drinking even half a glass of anything.     I no longer am able to prescribe any controlled substance (including pain medications) for you as they are not safe with alcohol. If you get pain again, we will have to have you see a pain clinic and manage with them. Let me know if you change your mind about referral to pain clinic.
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
## 2020-09-03 NOTE — TELEPHONE ENCOUNTER
Please review referral. Please route back to writer.     Thank you,    Sharee Shields    [facility]

## 2020-09-04 NOTE — TELEPHONE ENCOUNTER
Writer attempted to reach pt; no answer. LVM requesting a call back for an appt. Two more attempts will be made.     Sharee Shields    [facility]

## 2020-09-09 NOTE — TELEPHONE ENCOUNTER
Writer attempted to reach pt; no answer. LVM requesting a call back for an appt. One more attempt will be made.     Sharee Shields    [facility]

## 2020-09-14 DIAGNOSIS — E11.65 TYPE 2 DIABETES MELLITUS WITH HYPERGLYCEMIA, WITH LONG-TERM CURRENT USE OF INSULIN (H): ICD-10-CM

## 2020-09-14 DIAGNOSIS — Z79.4 TYPE 2 DIABETES MELLITUS WITH HYPERGLYCEMIA, WITH LONG-TERM CURRENT USE OF INSULIN (H): ICD-10-CM

## 2020-09-14 RX ORDER — INSULIN GLARGINE 100 [IU]/ML
8 INJECTION, SOLUTION SUBCUTANEOUS AT BEDTIME
Qty: 15 ML | Refills: 1 | Status: SHIPPED | OUTPATIENT
Start: 2020-09-14 | End: 2021-04-22

## 2020-09-14 NOTE — TELEPHONE ENCOUNTER
Pending Prescriptions:                       Disp   Refills    insulin glargine (BASAGLAR KWIKPEN) 100 U*15 mL  1            Sig: Inject 8 Units Subcutaneous At Bedtime    Patient is requesting a New Rx. Need to dispense in full boxes for insurance purposes. Each box is 15mL

## 2020-09-14 NOTE — TELEPHONE ENCOUNTER
Third and final attempt to reach pt; no answer. LVM requesting a call back for an appt.   Writer is routing this encounter to referring provider. Please inform pt that we tried to reach her to get her scheduled with an addiction medicine provider. Please have pt call us back if she is still interested in being seen at [facility]. [phone]. Writer is closing this encounter, no further action needed.     Thank you,  Sharee Shields    [employer] [facility]

## 2020-09-16 NOTE — TELEPHONE ENCOUNTER
Can we send a letter with the info below for her so she can call to schedule if she changes her mind. Unfortunate she didn't schedule.     Dania Munoz PA-C

## 2020-09-18 DIAGNOSIS — R79.89 ELEVATED FERRITIN: ICD-10-CM

## 2020-09-18 DIAGNOSIS — N39.0 URINARY TRACT INFECTION WITHOUT HEMATURIA, SITE UNSPECIFIED: ICD-10-CM

## 2020-09-18 DIAGNOSIS — K86.1 CHRONIC PANCREATITIS, UNSPECIFIED PANCREATITIS TYPE (H): ICD-10-CM

## 2020-09-18 DIAGNOSIS — N91.2 ABSENCE OF MENSTRUATION: ICD-10-CM

## 2020-09-18 LAB
ALBUMIN UR-MCNC: NEGATIVE MG/DL
APPEARANCE UR: CLEAR
BILIRUB UR QL STRIP: ABNORMAL
CASTS #/AREA URNS LPF: ABNORMAL /LPF (ref 0–2)
CHOLEST SERPL-MCNC: 88 MG/DL
COLOR UR AUTO: YELLOW
FERRITIN SERPL-MCNC: 1900 NG/ML (ref 12–150)
FOLATE SERPL-MCNC: 8.9 NG/ML
FSH SERPL-ACNC: 4 IU/L
GLUCOSE UR STRIP-MCNC: NEGATIVE MG/DL
HDLC SERPL-MCNC: 43 MG/DL
HGB BLD-MCNC: 12.3 G/DL (ref 11.7–15.7)
HGB UR QL STRIP: NEGATIVE
KETONES UR STRIP-MCNC: NEGATIVE MG/DL
LDLC SERPL CALC-MCNC: <1 MG/DL
LEUKOCYTE ESTERASE UR QL STRIP: NEGATIVE
NITRATE UR QL: NEGATIVE
NON-SQ EPI CELLS #/AREA URNS LPF: ABNORMAL /LPF
NONHDLC SERPL-MCNC: 45 MG/DL
PH UR STRIP: 6 PH (ref 5–7)
RBC #/AREA URNS AUTO: ABNORMAL /HPF
SOURCE: ABNORMAL
SP GR UR STRIP: >1.03 (ref 1–1.03)
TRIGL SERPL-MCNC: 358 MG/DL
UROBILINOGEN UR STRIP-ACNC: 0.2 EU/DL (ref 0.2–1)
WBC #/AREA URNS AUTO: ABNORMAL /HPF

## 2020-09-18 PROCEDURE — 82728 ASSAY OF FERRITIN: CPT | Performed by: PHYSICIAN ASSISTANT

## 2020-09-18 PROCEDURE — 81001 URINALYSIS AUTO W/SCOPE: CPT | Performed by: PHYSICIAN ASSISTANT

## 2020-09-18 PROCEDURE — 85018 HEMOGLOBIN: CPT | Performed by: PHYSICIAN ASSISTANT

## 2020-09-18 PROCEDURE — 83001 ASSAY OF GONADOTROPIN (FSH): CPT | Performed by: PHYSICIAN ASSISTANT

## 2020-09-18 PROCEDURE — 36415 COLL VENOUS BLD VENIPUNCTURE: CPT | Performed by: PHYSICIAN ASSISTANT

## 2020-09-18 PROCEDURE — 80061 LIPID PANEL: CPT | Performed by: PHYSICIAN ASSISTANT

## 2020-09-18 PROCEDURE — 82746 ASSAY OF FOLIC ACID SERUM: CPT | Performed by: PHYSICIAN ASSISTANT

## 2020-09-21 DIAGNOSIS — E78.2 ELEVATED TRIGLYCERIDES WITH HIGH CHOLESTEROL: Primary | ICD-10-CM

## 2020-09-22 ENCOUNTER — MYC MEDICAL ADVICE (OUTPATIENT)
Dept: FAMILY MEDICINE | Facility: CLINIC | Age: 40
End: 2020-09-22
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
## 2020-09-22 NOTE — LETTER
[facility]  [address] [facility] [address]  Phone: [phone]    September 22, 2020        Zulay Rossi  [address] [place]  [facility] [address]          To whom it may concern:    RE: Zulay Rossi    Patient can return to work without restrictions.     Please contact me for questions or concerns.      Sincerely,        Dania Munoz PA-C

## 2020-09-22 NOTE — TELEPHONE ENCOUNTER
Comm with patient via [employer] how she would like to attain this letter. Huong Ghotra TC/Pt Rep

## 2020-09-22 NOTE — TELEPHONE ENCOUNTER
Called the patient at #  [phone]. She printed the work note off of [employer]- no signed copy needed.  Erin Coombs TC

## 2020-09-23 ENCOUNTER — MYC MEDICAL ADVICE (OUTPATIENT)
Dept: ENDOCRINOLOGY | Facility: CLINIC | Age: 40
End: 2020-09-23

## 2020-09-23 ENCOUNTER — VIRTUAL VISIT (OUTPATIENT)
Dept: ENDOCRINOLOGY | Facility: CLINIC | Age: 40
End: 2020-09-23
Payer: COMMERCIAL

## 2020-09-23 DIAGNOSIS — Z53.9 NO SHOW: Primary | ICD-10-CM
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
## 2020-09-25 ENCOUNTER — VIRTUAL VISIT (OUTPATIENT)
Dept: ENDOCRINOLOGY | Facility: CLINIC | Age: 40
End: 2020-09-25
Payer: COMMERCIAL

## 2020-09-25 DIAGNOSIS — Z79.4 TYPE 2 DIABETES MELLITUS WITH HYPERGLYCEMIA, WITH LONG-TERM CURRENT USE OF INSULIN (H): Primary | ICD-10-CM

## 2020-09-25 DIAGNOSIS — I10 BENIGN ESSENTIAL HYPERTENSION: ICD-10-CM

## 2020-09-25 DIAGNOSIS — E11.65 TYPE 2 DIABETES MELLITUS WITH HYPERGLYCEMIA, WITH LONG-TERM CURRENT USE OF INSULIN (H): Primary | ICD-10-CM

## 2020-09-25 PROCEDURE — 99214 OFFICE O/P EST MOD 30 MIN: CPT | Mod: TEL | Performed by: INTERNAL MEDICINE
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
## 2020-10-01 ENCOUNTER — TRANSFERRED RECORDS (OUTPATIENT)
Dept: HEALTH INFORMATION MANAGEMENT | Facility: CLINIC | Age: 40
End: 2020-10-01

## 2020-10-02 DIAGNOSIS — E78.2 ELEVATED TRIGLYCERIDES WITH HIGH CHOLESTEROL: ICD-10-CM

## 2020-10-02 DIAGNOSIS — N60.91 ATYPICAL DUCTAL HYPERPLASIA OF RIGHT BREAST: ICD-10-CM

## 2020-10-02 DIAGNOSIS — Z79.4 TYPE 2 DIABETES MELLITUS WITH HYPERGLYCEMIA, WITH LONG-TERM CURRENT USE OF INSULIN (H): ICD-10-CM

## 2020-10-02 DIAGNOSIS — E11.65 TYPE 2 DIABETES MELLITUS WITH HYPERGLYCEMIA, WITH LONG-TERM CURRENT USE OF INSULIN (H): ICD-10-CM

## 2020-10-02 DIAGNOSIS — F10.10 ALCOHOL ABUSE: Primary | ICD-10-CM

## 2020-10-02 DIAGNOSIS — K76.0 FATTY INFILTRATION OF LIVER: ICD-10-CM

## 2020-10-02 DIAGNOSIS — E10.9 TYPE 1 DIABETES MELLITUS WITHOUT COMPLICATION (H): ICD-10-CM

## 2020-10-02 LAB
ALT SERPL W P-5'-P-CCNC: 86 U/L (ref 0–50)
ANION GAP SERPL CALCULATED.3IONS-SCNC: 9 MMOL/L (ref 3–14)
AST SERPL W P-5'-P-CCNC: 314 U/L (ref 0–45)
BUN SERPL-MCNC: 7 MG/DL (ref 7–30)
CALCIUM SERPL-MCNC: 8.1 MG/DL (ref 8.5–10.1)
CHLORIDE SERPL-SCNC: 103 MMOL/L (ref 94–109)
CHOLEST SERPL-MCNC: 156 MG/DL
CO2 SERPL-SCNC: 29 MMOL/L (ref 20–32)
CREAT SERPL-MCNC: 0.46 MG/DL (ref 0.52–1.04)
GFR SERPL CREATININE-BSD FRML MDRD: >90 ML/MIN/{1.73_M2}
GLUCOSE SERPL-MCNC: 88 MG/DL (ref 70–99)
HBA1C MFR BLD: 5.8 % (ref 0–5.6)
HDLC SERPL-MCNC: 42 MG/DL
LDLC SERPL CALC-MCNC: ABNORMAL MG/DL
LDLC SERPL DIRECT ASSAY-MCNC: 69 MG/DL
NONHDLC SERPL-MCNC: 114 MG/DL
POTASSIUM SERPL-SCNC: 2.8 MMOL/L (ref 3.4–5.3)
SODIUM SERPL-SCNC: 141 MMOL/L (ref 133–144)
TRIGL SERPL-MCNC: 767 MG/DL

## 2020-10-02 PROCEDURE — 83036 HEMOGLOBIN GLYCOSYLATED A1C: CPT | Performed by: INTERNAL MEDICINE

## 2020-10-02 PROCEDURE — 83721 ASSAY OF BLOOD LIPOPROTEIN: CPT | Mod: 59 | Performed by: INTERNAL MEDICINE

## 2020-10-02 PROCEDURE — 80061 LIPID PANEL: CPT | Performed by: INTERNAL MEDICINE

## 2020-10-02 PROCEDURE — 36415 COLL VENOUS BLD VENIPUNCTURE: CPT | Performed by: INTERNAL MEDICINE

## 2020-10-02 PROCEDURE — 84460 ALANINE AMINO (ALT) (SGPT): CPT | Performed by: INTERNAL MEDICINE

## 2020-10-02 PROCEDURE — 80048 BASIC METABOLIC PNL TOTAL CA: CPT | Performed by: INTERNAL MEDICINE

## 2020-10-02 PROCEDURE — 84450 TRANSFERASE (AST) (SGOT): CPT | Performed by: INTERNAL MEDICINE

## 2020-10-02 RX ORDER — CEFAZOLIN SODIUM 1 G/50ML
1 INJECTION, SOLUTION INTRAVENOUS SEE ADMIN INSTRUCTIONS
Status: CANCELLED | OUTPATIENT
Start: 2020-10-02

## 2020-10-02 RX ORDER — CEFAZOLIN SODIUM 2 G/50ML
2 SOLUTION INTRAVENOUS
Status: CANCELLED | OUTPATIENT
Start: 2020-10-02

## 2020-10-02 RX ORDER — ACETAMINOPHEN 325 MG/1
975 TABLET ORAL ONCE
Status: CANCELLED | OUTPATIENT
Start: 2020-10-02 | End: 2020-10-02

## 2020-10-05 ENCOUNTER — DOCUMENTATION ONLY (OUTPATIENT)
Dept: ONCOLOGY | Facility: CLINIC | Age: 40
End: 2020-10-05

## 2020-10-05 DIAGNOSIS — E11.65 TYPE 2 DIABETES MELLITUS WITH HYPERGLYCEMIA, WITH LONG-TERM CURRENT USE OF INSULIN (H): ICD-10-CM

## 2020-10-05 DIAGNOSIS — Z11.59 ENCOUNTER FOR SCREENING FOR OTHER VIRAL DISEASES: Primary | ICD-10-CM

## 2020-10-05 DIAGNOSIS — E87.6 HYPOKALEMIA: ICD-10-CM

## 2020-10-05 DIAGNOSIS — Z79.4 TYPE 2 DIABETES MELLITUS WITH HYPERGLYCEMIA, WITH LONG-TERM CURRENT USE OF INSULIN (H): ICD-10-CM

## 2020-10-05 DIAGNOSIS — E83.51 HYPOCALCEMIA: Primary | ICD-10-CM

## 2020-10-05 DIAGNOSIS — R74.8 ELEVATED LIVER ENZYMES: Primary | ICD-10-CM

## 2020-10-05 RX ORDER — INSULIN ASPART 100 [IU]/ML
INJECTION, SOLUTION INTRAVENOUS; SUBCUTANEOUS
Qty: 15 ML | Refills: 11 | Status: SHIPPED | OUTPATIENT
Start: 2020-10-05 | End: 2021-05-11

## 2020-10-05 NOTE — TELEPHONE ENCOUNTER
FUTURE VISIT INFORMATION      SURGERY INFORMATION:    Date: 10/22/20    [facility]    Surgeon:  Sarah Cohen MD    Anesthesia Type:  Combined General with Block    Procedure: RIGHT Wire-Localized Lumpectomy    Consult: ov     RECORDS REQUESTED FROM:       Primary Care [facility]    Pertinent Medical History: hypertension    Most recent EKG+ Tracin20    Most recent ECHO: 3/18/10

## 2020-10-05 NOTE — RESULT ENCOUNTER NOTE
Efrain Biswas,       Your recent test results are attached, if you have any questions or concerns please feel free to contact me via e-mail or call [phone].  Unfortunately your ast liver enzyme is still very elevated. Somewhat improved from last time. We can check again in another month. Alt is about the same. Please make sure you follow up with gastroenterology and follow their recommendations also.       Sincerely,  Dania Munoz PA-C

## 2020-10-05 NOTE — PROGRESS NOTES
Sent patient [employer] information as patient is very active on [employer]. Sent the following:    Surgery is scheduled with Dr. Cohen on 10/22 at [facility].  Scheduled per surgeon.    H&P: to be completed by PAC.  PAC: 10/7  POST-OP: 11/5    The surgery packet was provided via [employer].    Pt is aware that they will be contacted to schedule a COVID-19 test before surgery.    They are aware to arrive at 9:00 AM and check in on the 5th floor of the [facility] on 10/22.    Patient is also aware surgery date/location may change due to PAC determination.

## 2020-10-06 ENCOUNTER — VIRTUAL VISIT (OUTPATIENT)
Dept: EDUCATION SERVICES | Facility: CLINIC | Age: 40
End: 2020-10-06
Payer: COMMERCIAL

## 2020-10-06 DIAGNOSIS — E10.9 TYPE 1 DIABETES MELLITUS WITHOUT COMPLICATION (H): Primary | ICD-10-CM

## 2020-10-06 PROBLEM — K70.11 ALCOHOLIC HEPATITIS WITH ASCITES (H): Status: ACTIVE | Noted: 2020-08-09

## 2020-10-06 PROBLEM — R19.5 LOOSE STOOLS: Status: ACTIVE | Noted: 2018-09-23

## 2020-10-06 PROBLEM — H49.20 ABDUCENS (SIXTH) NERVE PALSY: Status: ACTIVE | Noted: 2020-08-08

## 2020-10-06 PROBLEM — I81 PORTAL VEIN THROMBOSIS: Status: ACTIVE | Noted: 2018-10-31

## 2020-10-06 PROBLEM — L03.031 CELLULITIS OF TOE OF RIGHT FOOT: Status: ACTIVE | Noted: 2019-07-02

## 2020-10-06 PROBLEM — J45.909 REACTIVE AIRWAY DISEASE: Status: ACTIVE | Noted: 2018-10-31

## 2020-10-06 PROCEDURE — G0108 DIAB MANAGE TRN  PER INDIV: HCPCS | Mod: GT | Performed by: REGISTERED NURSE

## 2020-10-06 NOTE — Clinical Note
Hi Dr. Mtz,  I had a nice visit with Zulay.  She is over-treating both lows and highs--sort of guessing at how to correct a high, so I gave her a conservative correction scale and we reviewed how to use it.  I will be following up with her again in one week.  Please let me know if you would like to do anything differently.  Thanks so much.  Autumn.

## 2020-10-06 NOTE — PROGRESS NOTES
"Zulay Rossi is a 40 year old female who is being evaluated via a billable video visit.      The patient has been notified of following:     \"This video visit will be conducted via a call between you and your physician/provider. We have found that certain health care needs can be provided without the need for an in-person physical exam.  This service lets us provide the care you need with a video conversation.  If a prescription is necessary we can send it directly to your pharmacy.  If lab work is needed we can place an order for that and you can then stop by our lab to have the test done at a later time.    Video visits are billed at different rates depending on your insurance coverage.  Please reach out to your insurance provider with any questions.    If during the course of the call the physician/provider feels a video visit is not appropriate, you will not be charged for this service.\"    Patient has given verbal consent for Video visit? Yes  How would you like to obtain your AVS? MyChart  If you are dropped from the video visit, the video invite should be resent to: Send to e-mail at: [email or web address]  Will anyone else be joining your video visit? No        Video-Visit Details    Type of service:  Video Visit    Video Start Time: 12:30 PM   Video End Time:   13:27 PM    Originating Location (pt. Location): Home    Distant Location (provider location):  [facility] DIABETES EDUCATION [place]     Platform used for Video Visit: Mindy Howell RN    Diabetes Self-Management Education & Support  DIABETES EDUCATION NOTE:    Zulay Rossi presents today for education related to Type 2 diabetes  Patient is being treated with:  intensive insulin program  She is accompanied by self    PATIENT CONCERNS RELATED TO DIABETES SELF MANAGEMENT:    Diagnosed with diabetes several years ago.  Taking long acting insulin until recently.  Novolog started, however she feels that her BG's are \"up and down\",  " "\"sort of all over.\"  She has questions specifically about treating hypoglycemia and hyperglycemia.     PROGRESS TOWARD GOALS: Recently diagnosed with a breast mass and is scheduled for a lumpectomy October 22, 2020.  She has managed to get her A1C below 6 (at the expense of frequent hypoglycemia) in preparation for this.      ASSESSMENT:    Current Diabetes Management per Patient:  Taking diabetes medications?   yes:     Diabetes Medication(s)     Insulin       insulin aspart (NOVOLOG FLEXPEN) 100 UNIT/ML pen    Up to 30 units a day.     insulin glargine (BASAGLAR KWIKPEN) 100 UNIT/ML pen    Inject 8 Units Subcutaneous At Bedtime          Monitoring  Patient glucose self monitoring as follows: 6-12 times daily using Bry 14 day sensor.    BG meter: unknown meter  BG results:              BG values are: Her average sensor glucose is good, however as noted above, she has a significant amount of variability.  A1C has improved significantly.    Patient's most recent   Lab Results   Component Value Date    A1C 5.8 10/02/2020    is meeting goal of <7.0    Exercise: no regular exercise program    Nutrition:   States that secondary to pancreatitis, she is unable to eat very much at a time, so eats small frequent meals.  She has had significant weight loss since diagnosis.  She states that she intentionally starting losing weight a couple of years ago.  Her weight at her heaviest was 143 in March 2018.  She states that she had wanted to drop her weight to 120, however she continued to lose weight unintentionally after she got down to 120 and her current weight is around 105.  She states that this is secondary to no appetite and difficulty with pancreatitis symptoms with eating.      Hypoglycemia:   Patient is at risk of hypoglycemia:   Yes   She has symptoms of hypoglycemia when her blood glucose is somewhere between 50 and 70.  She gets hot, sweaty, and feels lightheaded.  This is happening frequently overnight, but " particularly when she goes to bed with a lower blood glucose (around 100 mg/dL).   She treats with a piece of candy, some cookies or just eats food.         Stress Management:  Recently diagnosed with breast CA.  She has a lumpectomy scheduled October 22.  As a result of the amount of time she will require to recover, she was laid off from her job and at the end of October she will lose her health insurance.   She is applying for Medical Assistance.  She is quite stressed by this turn of events.                       EDUCATION and INSTRUCTION PROVIDED AT THIS VISIT:     Discussed appropriate treatment of hypoglycemia.  Discussed physiologic changes that occur during periods of hypoglycemia which make the sensor less reliable, so encouraged her to treat hypoglycemia, then wait 15 minutes, then recheck her glucose using her blood glucose meter.  This should help reduce over-treatment of hypoglycemia.  Discussed that hypoglycemia treatment is not uniform between all people.  Smaller BMI's require fewer carbs to treat.    It appears that many of her episodes of hyperglycemia follow hypoglycemia, so preventing hypoglycemia will hopefully prevent some of the high glucoses she has been seeing.  She is also doing some correction of post meal highs (she is afraid to dose for carbs, as appetite is unreliable and she doesn't always finish what she thinks she is going to eat, so waits until she is finished, then corrects her post meal glucose which then often results in a low).  She is guessing at the correction post meal.        Encourage her to give herself carb coverage for at least a portion of her meal that she is reasonably sure she can finish about 15-20 minutes prior to eating, and when she is done, cover the remainder of the carbs she ate.  Instructed not to correct her after meal blood glucose. I gave her a conservative correction scale of 1/50/175 to use pre-meal.      Follow up in one week.  Appointment made.       Patient-stated goal written and given to Zulay Rossi.  Verbalized and demonstrated understanding of instructions.     PLAN:  See patient instructions  AVS printed and given to patient    FOLLOW-UP:    Next appointment is October 14 at 09:30 (video).     Time spent with patient at today's visit was 60 minutes.      Any diabetes medication dose changes were made via the CDE Protocol and Collaborative Practice Agreement with [place] and  [facility].  A copy of this encounter was provided to patient's referring provider.

## 2020-10-07 ENCOUNTER — PRE VISIT (OUTPATIENT)
Dept: SURGERY | Facility: CLINIC | Age: 40
End: 2020-10-07

## 2020-10-07 ENCOUNTER — ANESTHESIA EVENT (OUTPATIENT)
Dept: SURGERY | Facility: CLINIC | Age: 40
End: 2020-10-07

## 2020-10-07 ENCOUNTER — VIRTUAL VISIT (OUTPATIENT)
Dept: SURGERY | Facility: CLINIC | Age: 40
End: 2020-10-07
Payer: COMMERCIAL

## 2020-10-07 VITALS — WEIGHT: 105 LBS | HEIGHT: 62 IN | BODY MASS INDEX: 19.32 KG/M2

## 2020-10-07 DIAGNOSIS — Z01.818 PRE-OP EXAMINATION: Primary | ICD-10-CM

## 2020-10-07 PROCEDURE — 99202 OFFICE O/P NEW SF 15 MIN: CPT | Mod: 95 | Performed by: NURSE PRACTITIONER

## 2020-10-07 ASSESSMENT — PAIN SCALES - GENERAL: PAINLEVEL: NO PAIN (0)

## 2020-10-07 ASSESSMENT — MIFFLIN-ST. JEOR: SCORE: 1104.53

## 2020-10-07 ASSESSMENT — ENCOUNTER SYMPTOMS: SEIZURES: 1

## 2020-10-07 ASSESSMENT — LIFESTYLE VARIABLES: TOBACCO_USE: 1

## 2020-10-07 NOTE — PROGRESS NOTES
"Zulay Rossi is a 40 year old female who is being evaluated via a billable video visit.      The patient has been notified of following:     \"This video visit will be conducted via a call between you and your physician/provider. We have found that certain health care needs can be provided without the need for an in-person physical exam.  This service lets us provide the care you need with a video conversation.  If a prescription is necessary we can send it directly to your pharmacy.  If lab work is needed we can place an order for that and you can then stop by our lab to have the test done at a later time.    Video visits are billed at different rates depending on your insurance coverage.  Please reach out to your insurance provider with any questions.    If during the course of the call the physician/provider feels a video visit is not appropriate, you will not be charged for this service.\"    Patient has given verbal consent for Video visit? Yes  How would you like to obtain your AVS? Emeryhart  If you are dropped from the video visit, the video invite should be resent to: Other e-mail: [place]  Will anyone else be joining your video visit? No          Franky Ortiz        "

## 2020-10-07 NOTE — PATIENT INSTRUCTIONS
Preparing for Your Surgery      Name:  Zulay Rossi   MRN:  [id number]   :  1980   Today's Date:  10/7/2020         Arriving for surgery:  Surgery date:  10/22/2020  Arrival time:  9AM    Restrictions due to COVID 19:  One visitor at the Surgery Center  [facility] parking is not available     Please come to:    [facility]  [address] [place] [phone]    Please check in on the 5th floor at the Ambulatory Surgery Center         What can I eat or drink?    -  You may eat and drink normally until 8 hours before surgery. (Until 10/22/2020, 3:50AM)  -  You may have clear liquids up to 4 hours before surgery. (Until 10/22/2020, 7:50AM)  Examples of clear liquids:  Water  Clear broth  Juices (apple, white grape, white cranberry  and cider) without pulp  Noncarbonated, powder based beverages  (lemonade and Kd-Aid)  Sodas (Sprite, 7-Up, ginger ale and seltzer)  Coffee or tea (without milk or cream)  Gatorade    --No alcohol for at least 24 hours before surgery    Which medicines can I take?    Hold aspirin for 7 days before surgery.   Hold multivitamins for 7 days before surgery.  Hold supplements for 7 days before surgery.  Hold Ibuprofen (Advil, Motrin) for 1 day before surgery--unless otherwise directed by surgeon.  Hold Naproxen (Aleve) for 4 days before surgery.  Hold Colchicine(Colcyrs) and Indomethacin(Indocin) for 24 hours.    -  DO NOT take the following medications the day of surgery:    Creon     Folic Acid     Novolog Insulin   Losartan    -  PLEASE TAKE the following medications the day of surgery     Please take 6 Units of Glargine(Basaglar) insulin the evening prior to surgery.    Sertraline(Zoloft)   Ranitidine(Zantac)    Albuterol Neb as needed  Albuterol inhaler as needed and bring the day of surgery.    Metoclopramide(Reglan) as needed    How do I prepare myself?  - Please take 2 showers before surgery using Scrubcare or Hibiclens soap.    Use this soap only  from the neck to your toes.     Leave the soap on your skin for one minute--then rinse thoroughly.      You may use your own shampoo and conditioner; no other hair products.   - Please remove all jewelry and body piercings.  - No lotions, deodorants or fragrance.  - No makeup or fingernail polish.   - Bring your ID and insurance card.        - All patients are required to have a Covid-19 test within 4 days of surgery/procedure.      -Patients will be contacted by the [facility] scheduling team within 1 week of surgery to make an appointment.      - Patients may call the Scheduling team at [phone] if they have not been scheduled within 4 days of  surgery.      ALL PATIENTS ARE REQUIRED TO HAVE A RESPONSIBLE ADULT TO DRIVE AND BE IN ATTENDANCE WITH THEM FOR 24 HOURS FOLLOWING SURGERY       Questions or Concerns:    -For questions regarding the day of surgery please contact the Ambulatory Surgery Center at [phone].    -If you have health changes between today and your surgery please contact your surgeon.     For questions after surgery please call your surgeons office.

## 2020-10-07 NOTE — PATIENT INSTRUCTIONS
Gavino Biswas!  It was great to meet you yesterday.  Here are some of the things we discussed to help smooth out your blood glucoses:    Before bed, if your glucose is less than 100, have about 15 grams of carbohydrate as a snack without covering with any insulin.  This could be a piece of fruit, a 15 gram granola bar, or a small serving of some ice-cream (1/2 cup or less).      When covering a meal, if you are unsure about how much you are going to eat, give yourself a correction, if needed,  plus meal coverage for what you estimate you will be able to finish.  If you eat more than what you covered, then administer the remainder of it as soon as you finish.    Avoid correcting after meal glucoses--this usually results in hypoglycemia.      THIS IS YOUR CORRECTION SCALE.  ADD THE CORRESPONDING AMOUNT OF INSULIN TO YOUR MEAL COVERAGE, BASED ON WHAT YOUR BG IS BEFORE EATING.  DO NOT CORRECT AFTER-MEAL BG'S OR CORRECT YOUR BLOOD GLUCOSE MORE OFTEN THAN EVERY FOUR HOURS.    If blood glucose is: Add extra Humalog/Novolog   175-225 1 unit   226-275 2 units   276-325 3 units   326-375 4 units   376-425 5 units   Over 425  6 units and call MD         Hypoglycemia treatment:    Defined as anything less than 70 mg/dL  Best treated with a small portion (10-15 grams CHO) of simple carbohydrate.  This could be a packet of fruit snacks (about 19 grams), a juice box (about 15 grams), 8 ounces of low fat milk (about 13 grams) or hard candy (3 pieces of most hard candy is about 15 grams--6 Lifesavers is about 12-15 grams).  Remember to wait 15 minutes and then re-check your actual blood glucose to tell you when you are recovering from the low.  If you are still under 70 after 15 minutes, then re-treat with another 10-15 grams.      We have a follow up appointment next week by video on Wednesday, October 14 at 09:30 am. Let me know if you have any questions or concerns before then.  Here is my contact information:    Autumn Howell,  LEON, RN, MARY ANNES, CDTC  Certified Diabetes Care and   [facility] Endocrinology and Diabetes  [facility]  Clinic [phone]  Phone [phone] (voicemail)  Email:  [email or web address]
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
## 2020-10-08 ENCOUNTER — MYC MEDICAL ADVICE (OUTPATIENT)
Dept: FAMILY MEDICINE | Facility: CLINIC | Age: 40
End: 2020-10-08

## 2020-10-08 DIAGNOSIS — E87.6 LOW SERUM POTASSIUM LEVEL: Primary | ICD-10-CM

## 2020-10-09 RX ORDER — POTASSIUM CHLORIDE 1500 MG/1
20 TABLET, EXTENDED RELEASE ORAL 2 TIMES DAILY
Qty: 60 TABLET | Refills: 1 | Status: SHIPPED | OUTPATIENT
Start: 2020-10-09 | End: 2020-11-06

## 2020-10-14 ENCOUNTER — VIRTUAL VISIT (OUTPATIENT)
Dept: EDUCATION SERVICES | Facility: CLINIC | Age: 40
End: 2020-10-14
Payer: COMMERCIAL

## 2020-10-14 DIAGNOSIS — E10.9 TYPE 1 DIABETES MELLITUS WITHOUT COMPLICATION (H): Primary | ICD-10-CM

## 2020-10-14 PROCEDURE — G0108 DIAB MANAGE TRN  PER INDIV: HCPCS | Mod: GT | Performed by: REGISTERED NURSE

## 2020-10-15 NOTE — PATIENT INSTRUCTIONS
Gavino Biswas--great to see you again today.  I'll be thinking of you Thursday!      To reduce the amount of hypoglycemia you are having, you're going to reduce your Basaglar insulin to 7 units.      Try covering carbohydrates with 1 unit of Novolog for every 20 grams of carbohydrate.  As we discussed before, If you aren't sure how much you will be able to finish, try giving your correction dose and partial meal coverage.  Give the remainder when you finish eating and know how many carbs you ate.      THIS IS YOUR CORRECTION SCALE.  ADD THE CORRESPONDING AMOUNT OF INSULIN TO YOUR MEAL COVERAGE, BASED ON WHAT YOUR BG IS BEFORE EATING.  DO NOT CORRECT AFTER-MEAL BG'S OR CORRECT YOUR BLOOD GLUCOSE MORE OFTEN THAN EVERY FOUR HOURS.     If blood glucose is: Add extra Humalog/Novolog   175-225 1 unit   226-275 2 units   276-325 3 units   326-375 4 units   376-425 5 units   Over 425  6 units and call MD           We talked about using the In-Pen device (it's an insulin pen that uses Novolog cartridges).  When paired with the In-Pen phone application, it provides more precise dosing of Novolog, similar to what an insulin pump does.  We would program your insulin to carb ratio, your correction factor, and Novolog's insulin active time.  The rosa will record all of your insulin doses, your glucoses, and will provide reminders if you need them.  I think it will be a good stop-gap measure.  If you think that you will have insurance coverage going forward we can discuss getting an insulin pump sooner.  Let me know what you think.  Here is some information about treating hypoglycemia:    Hypoglycemia treatment:    Defined as anything less than 70 mg/dL  Best treated with a small portion (10-15 grams CHO) of simple carbohydrate.  This could be a packet of fruit snacks (about 19 grams), a juice box (about 15 grams), 8 ounces of low fat milk (about 13 grams) or hard candy (3 pieces of most hard candy is about 15 grams--6 Lifesavers is  about 12-15 grams).  Remember to wait 15 minutes and then re-check your actual blood glucose to tell you when you are recovering from the low.  If you are still under 70 after 15 minutes, then re-treat with another 10-15 grams.         Autumn Howell, CEDRICN, RN, [facility], CDTC  Certified Diabetes Care and   [facility] Endocrinology and Diabetes  [facility]  Clinic [address]  Phone [phone] (voicemail)  Email:  [email or web address]

## 2020-10-15 NOTE — PROGRESS NOTES
"Zulay Rossi is a 40 year old female who is being evaluated via a billable video visit.      The patient has been notified of following:     \"This video visit will be conducted via a call between you and your physician/provider. We have found that certain health care needs can be provided without the need for an in-person physical exam.  This service lets us provide the care you need with a video conversation.  If a prescription is necessary we can send it directly to your pharmacy.  If lab work is needed we can place an order for that and you can then stop by our lab to have the test done at a later time.    Video visits are billed at different rates depending on your insurance coverage.  Please reach out to your insurance provider with any questions.    If during the course of the call the physician/provider feels a video visit is not appropriate, you will not be charged for this service.\"    Patient has given verbal consent for Video visit? Yes  How would you like to obtain your AVS? MyChart  If you are dropped from the video visit, the video invite should be resent to: Send to e-mail at: [email or web address]  Will anyone else be joining your video visit? No        Video-Visit Details    Type of service:  Video Visit    Video Start Time: 1435  PM  Video End Time:   1530 PM    Originating Location (pt. Location): Home    Distant Location (provider location):  [facility] DIABETES EDUCATION [place]     Platform used for Video Visit: Mindy Howell RN    Diabetes Self-Management Education & Support  DIABETES EDUCATION NOTE:    Zulay Rossi presents today for education related to Type 2 diabetes    Patient is being treated with:  insulin  She is accompanied by self    PATIENT CONCERNS RELATED TO DIABETES SELF MANAGEMENT:     Follow up from last week.    She is preparing for breast surgery in a week.    She has been keeping her blood glucoses pretty tightly controlled.  Having daily periods of " hypoglycemia.     ASSESSMENT:  Current Diabetes Management per Patient:  Taking diabetes medications?   yes:     Diabetes Medication(s)     Insulin       insulin aspart (NOVOLOG FLEXPEN) 100 UNIT/ML pen    Up to 30 units a day.     Patient taking differently: Inject Subcutaneous 3 times daily (with meals) Up to 30 units a day.     insulin glargine (BASAGLAR KWIKPEN) 100 UNIT/ML pen    Inject 8 Units Subcutaneous At Bedtime        Monitoring  Patient glucose self monitoring as follows: 6-12 times daily using her Bry sensor.    Libreview Report below:                                               Patient's most recent   Lab Results   Component Value Date    A1C 5.8 10/02/2020    is meeting goal of <7.0 at the risk of frequent hypoglycemia.                             EDUCATION and INSTRUCTION PROVIDED AT THIS VISIT:       She clearly has insulin sensitivity.  She is continuing to drop at random times during the day.  She works as a radiology technician and states that during the day she frequently drops her glucose.  She would benefit from an insulin pump to help accommodate these frequent drops.  Discussed using the In-Pen rosa with Novolog cartridges as well.  She is interested in this idea.   For now, instructed to reduce her Basaglar insulin by 1 unit daily to 7 units.  Her insulin to carb ratio appears to be about 1: 20 grams CHO and she has a correction scale that corrects 1 unit for every 50 mg/dL over a target of 175.   Instructions sent.  She is trying not to overtreat her hypoglycemia.     Patient-stated goal written and given to Zulay Rsosi.  Verbalized and demonstrated understanding of instructions.     PLAN:  See patient instructions  AVS printed and given to patient    FOLLOW-UP:       She will call as soon as she receives the In-Pen and cartridges.  Sent information about insulin pumping as well.       Time spent with patient at today's visit was 60 minutes.      Any diabetes medication dose changes  were made via the CDE Protocol and Collaborative Practice Agreement with [place] [facility].  A copy of this encounter was provided to patient's referring provider.

## 2020-10-19 DIAGNOSIS — Z11.59 ENCOUNTER FOR SCREENING FOR OTHER VIRAL DISEASES: ICD-10-CM

## 2020-10-19 PROCEDURE — U0003 INFECTIOUS AGENT DETECTION BY NUCLEIC ACID (DNA OR RNA); SEVERE ACUTE RESPIRATORY SYNDROME CORONAVIRUS 2 (SARS-COV-2) (CORONAVIRUS DISEASE [COVID-19]), AMPLIFIED PROBE TECHNIQUE, MAKING USE OF HIGH THROUGHPUT TECHNOLOGIES AS DESCRIBED BY CMS-2020-01-R: HCPCS | Performed by: SURGERY

## 2020-10-20 LAB
SARS-COV-2 RNA SPEC QL NAA+PROBE: NOT DETECTED
SPECIMEN SOURCE: NORMAL

## 2020-10-21 ENCOUNTER — ANESTHESIA EVENT (OUTPATIENT)
Dept: SURGERY | Facility: AMBULATORY SURGERY CENTER | Age: 40
End: 2020-10-21

## 2020-10-22 ENCOUNTER — ANCILLARY PROCEDURE (OUTPATIENT)
Dept: MAMMOGRAPHY | Facility: CLINIC | Age: 40
End: 2020-10-22
Attending: SURGERY
Payer: COMMERCIAL

## 2020-10-22 ENCOUNTER — HOSPITAL ENCOUNTER (OUTPATIENT)
Facility: AMBULATORY SURGERY CENTER | Age: 40
Discharge: HOME | End: 2020-10-22
Attending: SURGERY | Admitting: SURGERY
Payer: COMMERCIAL

## 2020-10-22 ENCOUNTER — ANESTHESIA (OUTPATIENT)
Dept: SURGERY | Facility: AMBULATORY SURGERY CENTER | Age: 40
End: 2020-10-22

## 2020-10-22 VITALS
RESPIRATION RATE: 16 BRPM | HEART RATE: 72 BPM | OXYGEN SATURATION: 96 % | BODY MASS INDEX: 20.24 KG/M2 | SYSTOLIC BLOOD PRESSURE: 144 MMHG | DIASTOLIC BLOOD PRESSURE: 103 MMHG | TEMPERATURE: 97.6 F | WEIGHT: 110 LBS | HEIGHT: 62 IN

## 2020-10-22 DIAGNOSIS — N60.91 ATYPICAL DUCTAL HYPERPLASIA OF RIGHT BREAST: ICD-10-CM

## 2020-10-22 LAB
ALBUMIN SERPL-MCNC: 2.9 G/DL (ref 3.4–5)
ALP SERPL-CCNC: 118 U/L (ref 40–150)
ALT SERPL W P-5'-P-CCNC: 61 U/L (ref 0–50)
ANION GAP SERPL CALCULATED.3IONS-SCNC: 9 MMOL/L (ref 3–14)
APTT PPP: 27 SEC (ref 22–37)
AST SERPL W P-5'-P-CCNC: 311 U/L (ref 0–45)
BILIRUB SERPL-MCNC: 0.6 MG/DL (ref 0.2–1.3)
BUN SERPL-MCNC: 6 MG/DL (ref 7–30)
CALCIUM SERPL-MCNC: 7.9 MG/DL (ref 8.5–10.1)
CHLORIDE SERPL-SCNC: 104 MMOL/L (ref 94–109)
CO2 SERPL-SCNC: 25 MMOL/L (ref 20–32)
CREAT SERPL-MCNC: 0.44 MG/DL (ref 0.52–1.04)
ERYTHROCYTE [DISTWIDTH] IN BLOOD BY AUTOMATED COUNT: 19.4 % (ref 10–15)
GFR SERPL CREATININE-BSD FRML MDRD: >90 ML/MIN/{1.73_M2}
GLUCOSE BLDC GLUCOMTR-MCNC: 134 MG/DL (ref 70–99)
GLUCOSE BLDC GLUCOMTR-MCNC: 160 MG/DL (ref 70–99)
GLUCOSE BLDC GLUCOMTR-MCNC: 225 MG/DL (ref 70–99)
GLUCOSE BLDC GLUCOMTR-MCNC: 281 MG/DL (ref 70–99)
GLUCOSE SERPL-MCNC: 269 MG/DL (ref 70–99)
HCG UR QL: NEGATIVE
HCT VFR BLD AUTO: 22.7 % (ref 35–47)
HGB BLD-MCNC: 7.4 G/DL (ref 11.7–15.7)
HGB BLD-MCNC: 7.5 G/DL (ref 11.7–15.7)
INR PPP: 1.06 (ref 0.86–1.14)
INTERNAL QC OK POCT: YES
MCH RBC QN AUTO: 41.9 PG (ref 26.5–33)
MCHC RBC AUTO-ENTMCNC: 33 G/DL (ref 31.5–36.5)
MCV RBC AUTO: 127 FL (ref 78–100)
PLATELET # BLD AUTO: 198 10E9/L (ref 150–450)
POTASSIUM SERPL-SCNC: 3.2 MMOL/L (ref 3.4–5.3)
PROT SERPL-MCNC: 6.7 G/DL (ref 6.8–8.8)
RBC # BLD AUTO: 1.79 10E12/L (ref 3.8–5.2)
SODIUM SERPL-SCNC: 138 MMOL/L (ref 133–144)
WBC # BLD AUTO: 5.7 10E9/L (ref 4–11)

## 2020-10-22 PROCEDURE — 76098 X-RAY EXAM SURGICAL SPECIMEN: CPT | Performed by: STUDENT IN AN ORGANIZED HEALTH CARE EDUCATION/TRAINING PROGRAM

## 2020-10-22 PROCEDURE — 36415 COLL VENOUS BLD VENIPUNCTURE: CPT | Performed by: PATHOLOGY

## 2020-10-22 PROCEDURE — 88307 TISSUE EXAM BY PATHOLOGIST: CPT | Performed by: PATHOLOGY

## 2020-10-22 PROCEDURE — 19125 EXCISION BREAST LESION: CPT | Mod: RT | Performed by: SURGERY

## 2020-10-22 PROCEDURE — 88342 IMHCHEM/IMCYTCHM 1ST ANTB: CPT | Mod: XS | Performed by: PATHOLOGY

## 2020-10-22 PROCEDURE — 999N001017 HC STATISTIC GLUCOSE BY METER IP: Performed by: PATHOLOGY

## 2020-10-22 PROCEDURE — 19281 PERQ DEVICE BREAST 1ST IMAG: CPT | Mod: RT | Performed by: STUDENT IN AN ORGANIZED HEALTH CARE EDUCATION/TRAINING PROGRAM

## 2020-10-22 PROCEDURE — 19125 EXCISION BREAST LESION: CPT | Mod: RT

## 2020-10-22 PROCEDURE — 88360 TUMOR IMMUNOHISTOCHEM/MANUAL: CPT | Performed by: PATHOLOGY

## 2020-10-22 PROCEDURE — 99207 MA BREAST WIRE PLACEMENT 1ST LESION RT: CPT | Mod: RT | Performed by: STUDENT IN AN ORGANIZED HEALTH CARE EDUCATION/TRAINING PROGRAM

## 2020-10-22 RX ORDER — CEFAZOLIN SODIUM 1 G/50ML
1 SOLUTION INTRAVENOUS SEE ADMIN INSTRUCTIONS
Status: DISCONTINUED | OUTPATIENT
Start: 2020-10-22 | End: 2020-10-23 | Stop reason: HOSPADM

## 2020-10-22 RX ORDER — BUPIVACAINE HYDROCHLORIDE 2.5 MG/ML
INJECTION, SOLUTION EPIDURAL; INFILTRATION; INTRACAUDAL PRN
Status: DISCONTINUED | OUTPATIENT
Start: 2020-10-22 | End: 2020-10-22

## 2020-10-22 RX ORDER — KETAMINE HYDROCHLORIDE 10 MG/ML
INJECTION INTRAMUSCULAR; INTRAVENOUS PRN
Status: DISCONTINUED | OUTPATIENT
Start: 2020-10-22 | End: 2020-10-22

## 2020-10-22 RX ORDER — NALOXONE HYDROCHLORIDE 0.4 MG/ML
.1-.4 INJECTION, SOLUTION INTRAMUSCULAR; INTRAVENOUS; SUBCUTANEOUS
Status: DISCONTINUED | OUTPATIENT
Start: 2020-10-22 | End: 2020-10-23 | Stop reason: HOSPADM

## 2020-10-22 RX ORDER — LIDOCAINE HYDROCHLORIDE 20 MG/ML
INJECTION, SOLUTION INFILTRATION; PERINEURAL PRN
Status: DISCONTINUED | OUTPATIENT
Start: 2020-10-22 | End: 2020-10-22

## 2020-10-22 RX ORDER — OXYCODONE HYDROCHLORIDE 5 MG/1
5 TABLET ORAL EVERY 4 HOURS PRN
Status: DISCONTINUED | OUTPATIENT
Start: 2020-10-22 | End: 2020-10-23 | Stop reason: HOSPADM

## 2020-10-22 RX ORDER — FENTANYL CITRATE 50 UG/ML
25-50 INJECTION, SOLUTION INTRAMUSCULAR; INTRAVENOUS EVERY 5 MIN PRN
Status: DISCONTINUED | OUTPATIENT
Start: 2020-10-22 | End: 2020-10-23 | Stop reason: HOSPADM

## 2020-10-22 RX ORDER — SODIUM CHLORIDE, SODIUM LACTATE, POTASSIUM CHLORIDE, CALCIUM CHLORIDE 600; 310; 30; 20 MG/100ML; MG/100ML; MG/100ML; MG/100ML
INJECTION, SOLUTION INTRAVENOUS CONTINUOUS
Status: DISCONTINUED | OUTPATIENT
Start: 2020-10-22 | End: 2020-10-23 | Stop reason: HOSPADM

## 2020-10-22 RX ORDER — PROPOFOL 10 MG/ML
INJECTION, EMULSION INTRAVENOUS CONTINUOUS PRN
Status: DISCONTINUED | OUTPATIENT
Start: 2020-10-22 | End: 2020-10-22

## 2020-10-22 RX ORDER — ONDANSETRON 4 MG/1
4 TABLET, ORALLY DISINTEGRATING ORAL EVERY 30 MIN PRN
Status: DISCONTINUED | OUTPATIENT
Start: 2020-10-22 | End: 2020-10-23 | Stop reason: HOSPADM

## 2020-10-22 RX ORDER — LIDOCAINE 40 MG/G
CREAM TOPICAL
Status: DISCONTINUED | OUTPATIENT
Start: 2020-10-22 | End: 2020-10-23 | Stop reason: HOSPADM

## 2020-10-22 RX ORDER — CEFAZOLIN SODIUM 2 G/50ML
2 SOLUTION INTRAVENOUS
Status: COMPLETED | OUTPATIENT
Start: 2020-10-22 | End: 2020-10-22

## 2020-10-22 RX ORDER — HYDROMORPHONE HYDROCHLORIDE 1 MG/ML
.3-.5 INJECTION, SOLUTION INTRAMUSCULAR; INTRAVENOUS; SUBCUTANEOUS EVERY 10 MIN PRN
Status: DISCONTINUED | OUTPATIENT
Start: 2020-10-22 | End: 2020-10-23 | Stop reason: HOSPADM

## 2020-10-22 RX ORDER — FENTANYL CITRATE 50 UG/ML
25-50 INJECTION, SOLUTION INTRAMUSCULAR; INTRAVENOUS
Status: DISCONTINUED | OUTPATIENT
Start: 2020-10-22 | End: 2020-10-23 | Stop reason: HOSPADM

## 2020-10-22 RX ORDER — DEXAMETHASONE SODIUM PHOSPHATE 4 MG/ML
INJECTION, SOLUTION INTRA-ARTICULAR; INTRALESIONAL; INTRAMUSCULAR; INTRAVENOUS; SOFT TISSUE PRN
Status: DISCONTINUED | OUTPATIENT
Start: 2020-10-22 | End: 2020-10-22

## 2020-10-22 RX ORDER — ONDANSETRON 2 MG/ML
4 INJECTION INTRAMUSCULAR; INTRAVENOUS EVERY 30 MIN PRN
Status: DISCONTINUED | OUTPATIENT
Start: 2020-10-22 | End: 2020-10-23 | Stop reason: HOSPADM

## 2020-10-22 RX ORDER — GABAPENTIN 300 MG/1
300 CAPSULE ORAL ONCE
Status: COMPLETED | OUTPATIENT
Start: 2020-10-22 | End: 2020-10-22

## 2020-10-22 RX ORDER — FLUMAZENIL 0.1 MG/ML
0.2 INJECTION, SOLUTION INTRAVENOUS
Status: DISCONTINUED | OUTPATIENT
Start: 2020-10-22 | End: 2020-10-23 | Stop reason: HOSPADM

## 2020-10-22 RX ORDER — ONDANSETRON 2 MG/ML
INJECTION INTRAMUSCULAR; INTRAVENOUS PRN
Status: DISCONTINUED | OUTPATIENT
Start: 2020-10-22 | End: 2020-10-22

## 2020-10-22 RX ORDER — PROPOFOL 10 MG/ML
INJECTION, EMULSION INTRAVENOUS PRN
Status: DISCONTINUED | OUTPATIENT
Start: 2020-10-22 | End: 2020-10-22

## 2020-10-22 RX ORDER — ACETAMINOPHEN 325 MG/1
975 TABLET ORAL ONCE
Status: DISCONTINUED | OUTPATIENT
Start: 2020-10-22 | End: 2020-10-23 | Stop reason: HOSPADM

## 2020-10-22 RX ORDER — BUPIVACAINE HYDROCHLORIDE 2.5 MG/ML
INJECTION, SOLUTION INFILTRATION; PERINEURAL PRN
Status: DISCONTINUED | OUTPATIENT
Start: 2020-10-22 | End: 2020-10-22 | Stop reason: HOSPADM

## 2020-10-22 RX ADMIN — DEXAMETHASONE SODIUM PHOSPHATE 4 MG: 4 INJECTION, SOLUTION INTRA-ARTICULAR; INTRALESIONAL; INTRAMUSCULAR; INTRAVENOUS; SOFT TISSUE at 15:12

## 2020-10-22 RX ADMIN — PROPOFOL 200 MCG/KG/MIN: 10 INJECTION, EMULSION INTRAVENOUS at 15:03

## 2020-10-22 RX ADMIN — DEXAMETHASONE SODIUM PHOSPHATE 4 MG: 4 INJECTION, SOLUTION INTRA-ARTICULAR; INTRALESIONAL; INTRAMUSCULAR; INTRAVENOUS; SOFT TISSUE at 15:03

## 2020-10-22 RX ADMIN — LIDOCAINE HYDROCHLORIDE 60 MG: 20 INJECTION, SOLUTION INFILTRATION; PERINEURAL at 15:03

## 2020-10-22 RX ADMIN — CEFAZOLIN SODIUM 2 G: 2 SOLUTION INTRAVENOUS at 15:14

## 2020-10-22 RX ADMIN — KETAMINE HYDROCHLORIDE 10 MG: 10 INJECTION INTRAMUSCULAR; INTRAVENOUS at 15:24

## 2020-10-22 RX ADMIN — SODIUM CHLORIDE, SODIUM LACTATE, POTASSIUM CHLORIDE, CALCIUM CHLORIDE: 600; 310; 30; 20 INJECTION, SOLUTION INTRAVENOUS at 14:55

## 2020-10-22 RX ADMIN — PROPOFOL 140 MG: 10 INJECTION, EMULSION INTRAVENOUS at 15:03

## 2020-10-22 RX ADMIN — ONDANSETRON 4 MG: 2 INJECTION INTRAMUSCULAR; INTRAVENOUS at 15:27

## 2020-10-22 RX ADMIN — FENTANYL CITRATE 50 MCG: 50 INJECTION, SOLUTION INTRAMUSCULAR; INTRAVENOUS at 13:26

## 2020-10-22 RX ADMIN — GABAPENTIN 300 MG: 300 CAPSULE ORAL at 09:26

## 2020-10-22 RX ADMIN — BUPIVACAINE HYDROCHLORIDE 15 ML: 2.5 INJECTION, SOLUTION EPIDURAL; INFILTRATION; INTRACAUDAL at 13:33

## 2020-10-22 RX ADMIN — KETAMINE HYDROCHLORIDE 10 MG: 10 INJECTION INTRAMUSCULAR; INTRAVENOUS at 15:03

## 2020-10-22 ASSESSMENT — LIFESTYLE VARIABLES: TOBACCO_USE: 1

## 2020-10-22 ASSESSMENT — ENCOUNTER SYMPTOMS: SEIZURES: 1

## 2020-10-22 ASSESSMENT — MIFFLIN-ST. JEOR: SCORE: 1127.21

## 2020-10-22 NOTE — PROGRESS NOTES
Preoperative Hb today was 7.5 (baseline 12+).  INR 1.06    Spoke with patient, who denies any symptoms of hematemesis, coffee grounds emesis, melena, or rectal bleeding.   She fell a couple of weeks ago and has a left sided chest wall bruise from that, but it is old.  She has upcoming endoscopies scheduled.    Discussed with Zulay Rossi risks and benefits of waiting versus going ahead.  The risk of bleeding is low but we reviewed that if she bled, she would likely require a transfusion and would have to be transferred to [place].  She understands and preferred to proceed today.  All questions answered.    Will proceed with RIGHT wire-localized lumpectomy today.    Sarah Cohen MD MSc FRC FACS    Division of Surgical Oncology  [facility]

## 2020-10-22 NOTE — OP NOTE
DATE OF SURGERY: October 22, 2020     SURGEON: Sarah Cohen MD  ASSISTANT:   1. Todd De Leon MD PGY-3  2. Carlos A Cordon MS-3    PREOPERATIVE DIAGNOSIS: Atypical ductal hyperplasia, right upper outer breast  POSTOPERATIVE DIAGNOSIS: same    PROCEDURE: Right wire-localized lumpectomy    ANESTHESIA: General + Block    CLINICAL NOTE:  Zulay Rossi is a 40 year old woman with atypical ductal hyperplasia of the right breast.  The risks and benefits of a wire-localized lumpectomy were discussed with the patient and she elected to proceed with informed consent.    OPERATIVE FINDINGS:  1. Specimen radiograph confirmed presence of the wire and biopsy clip within the specimen.     OPERATIVE PROCEDURE:  The patient first presented to the Breast Center for the wire-localization by the radiologist.  The wire-localization images were personally reviewed by me prior to the start of the procedure.   The patient was brought to the operating room and placed in the supine position with appropriate padding for all of the pressure points. A general anesthetic was administered.   A surgical safety checklist was performed to confirm the patient's identity, the site and laterality of the procedure. Perioperative antibiotics (Ancef) was provided.  VTE prophylaxis was provided with serial compression devices. The right breast was then prepped and draped in the usual sterile fashion using Chloraprep.     A curvilinear incision was made in the upper outer quadrant of the right breast.  The breast tissue denoted by the localizing wire was dissected out.  The specimen was inked and radiographed.  It was found to contain the biopsy clip and wire.  The specimen was then sent to pathology. The wound was irrigated and hemostasis was achieved.  A total of 4 mL of 0.25% marcaine was infiltrated into the surgical site. The incision was closed in two layers with interrupted 3-0 vicryl and a running subcuticular 4-0 monocryl.  Steristrips and dressings  were applied.  The patient tolerated the procedure well. The sponge, needle, instrument counts were correct.  The patient was then awakened and taken to recovery in stable fashion.     I was present and scrubbed for the entire above procedure.    EBL: 2 mL    SPECIMEN(S):  A. Right breast mass    POSTOPERATIVE PLANS:  Zulay Rossi will be discharged home today with wound care instructions and no prescription for analgesics.  She will follow-up with me in 2 weeks.     Sarah Cohen MD MSc FRC FACS    Division of Surgical Oncology  [facility]

## 2020-10-22 NOTE — ANESTHESIA PROCEDURE NOTES
Peripheral Nerve Block Procedure Note      Staff -   Anesthesiologist:  Mirella Wong MD  Performed By: anesthesiologist  Location: Pre-op  Procedure Start/Stop TImes:      10/22/2020 1:24 PM     10/22/2020 1:33 PM    patient identified, IV checked, site marked, risks and benefits discussed, informed consent, monitors and equipment checked, pre-op evaluation, at physician/surgeon's request and post-op pain management      Correct Patient: Yes      Correct Position: Yes      Correct Site: Yes      Correct Procedure: Yes      Correct Laterality:  Yes    Site Marked:  Yes  Procedure details:     Procedure:  Pectoralis    Diagnosis:  Postoperative pain relief    Laterality:  Right    Position:  Supine    Sterile Prep: chloraprep, mask and sterile gloves      Local skin infiltration:  None    Needle:  Insulated    Needle gauge:  21    Needle length (mm):  110    Ultrasound: Yes      Ultrasound used to identify targeted nerve, plexus, or vascular structure and placed a needle adjacent to it      Permanent Image entered into patiient's record      Abnormal pain on injection: No      Blood Aspirated: No      Paresthesias:  No    Bleeding at site: No      Bolus via:  Needle    Infusion Method:  Single Shot    Complications:  None  Assessment/Narrative:     Injection made incrementally with aspirations every (mL):  5     Informed consent was obtained.   Patient tolerated well. Incremental aspiration every 5 mL. No paresthesia, no heme. Needle tip visualized throughout with appropriate spread of local anesthetic in fascial plane.  Block was placed at the surgeon's request for post operative pain control.

## 2020-10-22 NOTE — PROGRESS NOTES
SBAR Wire Localization     SITUATION:  Patient to breast imaging center for imaging guided wire localizations before breast lumpectomy or excision biopsy without sentinel node injection.    BACKGROUND:  Breast imaging cancer, breast abnormality  Ordered procedure completed: Yes  Special needs identified: Yes     ASSESSMENT:  SBAR report called to patient care unit because of unexpected event in radiology: No  Allergies and medication list reviewed prior to procedure. Yes  Skin cleansed with ChloraPrep One-Step.  Anesthesia: approximately 10ml of 1% Lidocaine injection subcutaneous before wire insertion administered by the radiologist.   Gauze dressing over insertion site(s).  Post procedure mammogram completed: Yes    Patient tolerance: Tolerated procedure without issue    RECOMMENDATIONS:  Patient transferred to Same Day Surgery in stable condition via wheelchair with Breast Imaging Staff.    Please call Breast [facility] at [facility] [phone] if there are any questions.

## 2020-10-22 NOTE — NURSING NOTE
Chief Complaint   Patient presents with     Blood Draw     Labs drawn via PIV placed by Imaging. Lab only.      West Gallo RN

## 2020-10-22 NOTE — ANESTHESIA POSTPROCEDURE EVALUATION
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     Anesthesia Type: General, Peripheral Nerve Block       Disposition: Outpatient   Postop Pain Control: Uneventful            Sign Out: Well controlled pain   PONV: No   Neuro/Psych: Uneventful            Sign Out: Acceptable/Baseline neuro status   Airway/Respiratory: Uneventful            Sign Out: Acceptable/Baseline resp. status   CV/Hemodynamics: Uneventful            Sign Out: Acceptable CV status   Other NRE: NONE   DID A NON-ROUTINE EVENT OCCUR? No         Last Anesthesia Record Vitals:  CRNA VITALS  10/22/2020 1535 - 10/22/2020 1635      10/22/2020             Resp Rate (set):  10          Last PACU Vitals:  Vitals Value Taken Time   /105 10/22/20 1611   Temp 36.4  C (97.6  F) 10/22/20 1611   Pulse 76 10/22/20 1611   Resp 16 10/22/20 1611   SpO2 97 % 10/22/20 1611   Temp src     NIBP     Pulse     SpO2     Resp     Temp     Ht Rate     Temp 2           Electronically Signed By: Efrem Phipps MD, 2020, 5:02 PM

## 2020-10-22 NOTE — ANESTHESIA CARE TRANSFER NOTE
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    Anesthesia Type:   General, Peripheral Nerve Block     Note:  Airway :Face Mask  Patient transferred to:Phase II  Comments: Uneventful transport phase 2 room air  Report to MACIEL - merissa MASTERS  Blood glucose to be checked  Exchanging well; color natl  Pt responds appropriately to command  IV patent  Lips/teeth/dentition as preop status  Questions answered  /105  HR 79   TAT 97.6  RR 16  Sat 95-96% room air  Handoff Report: Identifed the Patient, Identified the Reponsible Provider, Reviewed the pertinent medical history, Discussed the surgical course, Reviewed Intra-OP anesthesia mangement and issues during anesthesia, Set expectations for post-procedure period and Allowed opportunity for questions and acknowledgement of understanding      Vitals: (Last set prior to Anesthesia Care Transfer)    CRNA VITALS  10/22/2020 1535 - 10/22/2020 1615      10/22/2020             Resp Rate (set):  10                Electronically Signed By: JONATHAN CLOUD CRNA  October 22, 2020  4:15 PM

## 2020-10-22 NOTE — DISCHARGE INSTRUCTIONS
"POSTOP Instructions: Lumpectomy    From Dr Cohen:  1. Patient to follow up with appointment in 2 weeks. Your pathology report will be reviewed with you at the postoperative visit.  2. Do not drive while taking narcotic pain medication (oxycodone) if prescribed.  3. Take Tylenol 1000 mg by mouth every 8 hours for 3 days. After that, may take as needed according to the packaging.  4. Avoid non-steroidal anti-inflammatory medications (Advil, Ibuprofen, Naproxen, aspirin, Indomethacin etc) for 5-7 days.   5. Caution with putting ice on the incision as it will be numb you will not realize it if you leave the ice for too long and can damage your skin.  6. If you develop any fever/chills, worsening pain, redness, swelling, or drainage from your wound please call the clinic (Monday through Friday 8:00am-5:00pm [phone] [address]) or on-call surgical oncology resident (nights and weekends [phone] and ask \"I would like to page the Surgical Oncology Resident on call.\")   7. No lifting over 15 lbs and no strenuous physical activity for 2 weeks.    Please continue to gently stretch your arm/shoulders and reach overhead.  No bed rest; moving around will keep blood clots from forming in your legs.  8. Keep dressing clean and dry. Okay to shower in 2 days. May remove outer large dressing in 2 days prior to shower.  The steristrips will fall off on their own in 10-14 days. Your sutures are dissolvable. Please refrain from submerging in a bathtub or swimming pool.  Please refrain from applying alcohol or peroxide to the incision.  9. Wear a supportive bra \"around the clock\" 24/7 for a minimum of 2 weeks.  10. No ice packs or heat packs on the surgical site(s).  11. Continue to wear the ACE wrap for at least 72 hours after surgery    [facility]  Home Care Following Anesthesia  For 24 hours after surgery:  1. Get plenty of rest.  A responsible adult must stay with you for at least 24 hours " "after you leave the surgery center.  2. Do not drive or use heavy equipment.  If you have weakness or tingling, don't drive or use heavy equipment until this feeling goes away.   3. Do not drink alcohol.   4. Avoid strenuous or risky activities.  Ask for help when climbing stairs.  5. You may feel lightheaded.  IF so, sit for a few minutes before standing.  Have someone help you get up.   6. If you have nausea (feel sick to your stomach): Drink only clear liquids such as apple juice, ginger ale, broth or 7-Up.  Rest may also help.  Be sure to drink enough fluids.  Move to a regular diet as you feel able.   7. You may have a slight fever.  Call the doctor if your fever is over 100 F (37.7 C) (taken under the tongue) or lasts longer than 24 hours.  8. You may have a dry mouth, a sore throat, muscle aches or trouble sleeping. These should go away after 24 hours.  9. Do not make important or legal decisions.        Today you received an Exparel block to numb the nerves near your surgery site.  This is a block using local anesthetic or \"numbing\" medication injected around the nerves to anesthetize or \"numb\" the area supplied by those nerves.  This block is injected into the muscle layer near your surgical site.  This medication may numb the location where you had surgery up to 72 hours.  If your surgical site is an arm or leg you should be careful with your affected limb, since it is possible to injure your limb without being aware of it due to the numbing.  Until full feeling returns, you should guard against bumping or hitting your limb, and avoid extreme hot or cold temperatures on the skin.  As the block wears off, the feeling will return as a tingling or prickly sensation near your surgical site.  You will experince more discomfort from your incision as the feeling returns.  You may want to take a pain pill (a narcotic or Tylenol if this was prescribed by your surgeon) when you start to experience mild pain before " the pain beomes more severe.  If your pain medications do not control your pain, you should notify your surgeon.    Tips for taking pain medications  To get the best pain relief possible, remember these points:    Take pain medications as directed, before pain becomes severe.    Pain medication can upset your stomach: taking it with food may help.    Constipation is a common side effect of pain medication. Drink plenty of  fluids.    Eat foods high in fiber. Take a stool softener if recommended by your doctor or pharmacist.    Do not drink alcohol, drive or operate machinery while taking pain medications.    Ask about other ways to control pain, such as with heat, ice or relaxation.    Tylenol/Acetaminophen Consumption  To help encourage the safe use of acetaminophen, the makers of TYLENOL  have lowered the maximum daily dose for single-ingredient Extra Strength TYLENOL  (acetaminophen) products sold in the [place] from 8 pills per day (4,000 mg) to 6 pills per day (3,000 mg). The dosing interval has also changed from 2 pills every 4-6 hours to 2 pills every 6 hours.    If you feel your pain relief is insufficient, you may take Tylenol/Acetaminophen in addition to your narcotic pain medication.     Be careful not to exceed 3,000 mg of Tylenol/Acetaminophen in a 24 hour period from all sources.    If you are taking extra strength Tylenol/acetaminophen (500 mg), the maximum dose is 6 tablets in 24 hours.    If you are taking regular strength acetaminophen (325 mg), the maximum dose is 9 tablets in 24 hours.    Call a doctor for any of the followin. Signs of infection (fever, growing tenderness at the surgery site, a large amount of drainage or bleeding, severe pain, foul-smelling drainage, redness, swelling).  2. It has been over 8 to 10 hours since surgery and you are still not able to urinate (pass water).  3. Headache for over 24 hours.  4. Signs of Covid-19 infection (temperature over 100 degrees, shortness of  "breath, cough, loss of taste/smell, generalized body aches, persistent headache, chills, sore throat, nausea/vomiting/diarrhea)  Your doctor is:  Dr. Sarah Cohen, [facility]: [phone]                 Or dial [phone] and ask for the resident on call for:  [facility]  For emergency care, call the:  [facility]:  [phone] (TTY for hearing impaired: [phone])  Information about liposomal bupivacaine (Exparel)    What is Liposomal Bupivacaine?    Liposomal Bupivacaine is a numbing medication that can help you manage your pain after surgery.  This medication is similar to \"novacaine,\" which is often used by the dentist.  Liposomal bupivacaine is released slowly and can help control pain for up to 72 hours.    What is the purpose of Liposomal Bupivacaine?    To manage your pain after surgery    To help you sleep better, take deep breaths, walk more comfortable, and feel up to visiting with others    How is the procedure done?    Liposomal bupivacaine is a medication given by an injection.    A needle is used to place the numbing medication under your skin.  It provides pain relief by numbing the tissue in the area where your surgeon will make the incision.    What can I expect?    You may experience numbness, tingling, or a feeling of heaviness around the area that was injected.    If you experience any of the follow symptoms IMMEDIATELY CONTACT YOUR SURGEON:    Numbness or tingling occurs in areas other than around the injection site    Blurry vision    Ringing in your ears    A metallic taste in your mouth    IF YOU ARE UNABLE TO CONNECT WITH YOUR SURGEON USING THE CLINIC NUMBER, CALL: [phone] and ask for the resident on call for your surgeon.      You should not receive any other type of numbing medication within 4 days after receiving liposomal bupivacaine unless your surgeon approves.              "

## 2020-10-22 NOTE — OR NURSING
Pt received pec block with exparel preoperatively. Pt received 1 mg Versed and 50 mcg Fentanyl IV. VSS. Pt tolerated procedure without immediate complication.

## 2020-10-23 ENCOUNTER — TELEPHONE (OUTPATIENT)
Dept: FAMILY MEDICINE | Facility: CLINIC | Age: 40
End: 2020-10-23

## 2020-10-23 ENCOUNTER — TELEPHONE (OUTPATIENT)
Dept: SURGERY | Facility: CLINIC | Age: 40
End: 2020-10-23

## 2020-10-23 DIAGNOSIS — D64.9 ANEMIA, UNSPECIFIED TYPE: Primary | ICD-10-CM

## 2020-10-23 NOTE — TELEPHONE ENCOUNTER
Received this message from her surgeon. Due to significant drop in hemoglobin I want her to see hematology asap.     Referral placed, chi

## 2020-10-23 NOTE — TELEPHONE ENCOUNTER
Pt called, No answer.  does not identify pt. Left general message for pt to call the [facility] back at [phone].    Writer received message from Dr. Cohen's RN Sridevi at the  to call and offer telephone post op on 11/3/20 opposed to being seen onsite at the .    Vicki De La Paz LPN

## 2020-10-23 NOTE — TELEPHONE ENCOUNTER
Left message on answering machine for patient to call back to [phone].  Barbara Overton BSN, RN

## 2020-10-23 NOTE — TELEPHONE ENCOUNTER
----- Message from Sarah Cohen MD sent at 10/22/2020 11:28 AM CDT -----  Regarding: Hb 7.4  Hi Ms Munoz,    I wanted to let you know that Zulay's preoperative Hb was 7.4 today.  We decided to proceed because of the low risk of bleeding and her anxiety about the breast and her concerns about losing insurance after this month.  I asked her to follow up with you early next week about the sudden Hb drop.  She denies any symptoms of GI bleeding.    Thanks,  TERENCE

## 2020-10-26 NOTE — TELEPHONE ENCOUNTER
According to pt's chart history. Pt communicates frequently via Prismatic. RN sent a Prismatic message regarding message below...Shiloh Gonzalez RN

## 2020-11-02 ENCOUNTER — TELEPHONE (OUTPATIENT)
Dept: ONCOLOGY | Facility: CLINIC | Age: 40
End: 2020-11-02

## 2020-11-02 LAB — COPATH REPORT: NORMAL

## 2020-11-03 ENCOUNTER — VIRTUAL VISIT (OUTPATIENT)
Dept: SURGERY | Facility: CLINIC | Age: 40
End: 2020-11-03
Payer: MEDICAID

## 2020-11-03 DIAGNOSIS — D05.11 DUCTAL CARCINOMA IN SITU (DCIS) OF RIGHT BREAST: Primary | ICD-10-CM

## 2020-11-03 PROCEDURE — 99024 POSTOP FOLLOW-UP VISIT: CPT | Performed by: SURGERY

## 2020-11-03 NOTE — PROGRESS NOTES
The above were completed by the medical assistant for the visit.    FOLLOW-UP/POST-OP   Nov 3, 2020    Zulay Rossi is a 40 year old female who is phoning in for her 1st post-operative follow-up visit.    Treatment to date:  1. Right wire-localized lumpectomy (10/22/2020) for atypia     HPI:    She underwent a right wire-localized lumpectomy on 10/22/2020 for atypia.  She is currently 2 week(s) post-op.  Final surgical pathology showed grade 1 ductal carcinoma in situ measuring 1.2 cm in size, with uninvolved margins.    On the day of surgery, preoperatively, her Hb was found to be 7.  The plan was for her to follow up with her PCP regarding this new anemia prior to surgery, but because the surgery was low risk, we proceeded.    Since the procedure, she has been doing well. She denies any redness or swelling, or drainage from the incision, or fever/chills.  She has good range of motion and denies any upper extremity swelling.     INVESTIGATIONS:    Surgical Pathology (10/22/2020):  FINAL DIAGNOSIS:   A. RIGHT BREAST, RIGHT UPPER OUTER BREAST, WIRE-LOCALIZED LUMPECTOMY:   - DUCTAL CARCINOMA IN-SITU (DCIS), low nuclear grade (grade 1), cribriform and solid type, measuring 1.2 cm in   linear extent.   - No evidence of invasive malignancy identified.   - Margins are uninvolved by DCIS; closest margin is medial at 3 mm.   - Flat epithelial atypia.   - Calcifications associated with benign ducts, acini and stroma.   - Prior biopsy site changes.   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    She is healing well.  I recommended she start moisturizing and massaging the scar.     We reviewed the pathology today and a copy of the report was  released on [employer]. We reviewed the margins were uninvolved; no further surgery is indicated.  We discussed radiation is indicated to complete breast conservation therapy for DCIS.  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  We discussed adjuvant endocrine therapy is indicated given the DCIS is ER+, to reduce a risk of recurrence and risk of contralateral breast cancer.  She already has an appointment with Dr Brennan for the preoperative anemia; I will contact him regarding her new diagnosis of DCIS as well.    We discussed surveillance involves annual mammogram and every 6 month physical exam. I will see her in 6 months. We reviewed that the first mammogram is typically performed at least 6 months after radiation therapy is completed.    All of the above was discussed and all questions were answered.    PLAN:  1. Radiation oncology - patient will make an appointment outside of the [facility] system  2. Medical oncology consultation  3. Follow up with me in 6 months    Phone call duration: 10 minutes    Sarah Cohen MD MSc FRC FACS    Division of Surgical Oncology  [facility]

## 2020-11-03 NOTE — TELEPHONE ENCOUNTER
ONCOLOGY INTAKE: Records Information      APPT INFORMATION:  Referring provider: Dania Munoz PA-C  Referring provider s clinic:  [facility]  Reason for visit/diagnosis:  anemia  Has patient been notified of appointment date and time?: Yes    RECORDS INFORMATION:  Were the records received with the referral (via [employer])? No,Internal Referral      Has patient been seen for any external appt for this diagnosis? No    If yes, where? NA    ADDITIONAL INFORMATION:  None

## 2020-11-03 NOTE — LETTER
"    11/3/2020         RE: Zulay Rossi  [address]  [place] [address]        Dear Colleague,    Thank you for referring your patient, Zulay Rossi, to the [facility]. Please see a copy of my visit note below.    Zulay Rossi is a 40 year old female who is being evaluated via a billable telephone visit.      The patient has been notified of following:     \"This telephone visit will be conducted via a call between you and your physician/provider. We have found that certain health care needs can be provided without the need for a physical exam.  This service lets us provide the care you need with a short phone conversation.  If a prescription is necessary we can send it directly to your pharmacy.  If lab work is needed we can place an order for that and you can then stop by our lab to have the test done at a later time.    Telephone visits are billed at different rates depending on your insurance coverage. During this emergency period, for some insurers they may be billed the same as an in-person visit.  Please reach out to your insurance provider with any questions.    If during the course of the call the physician/provider feels a telephone visit is not appropriate, you will not be charged for this service.\"    Patient has given verbal consent for Telephone visit?  Yes    What phone number would you like to be contacted at? [phone]     How would you like to obtain your AVS? Douglas De La Paz LPN            The above were completed by the medical assistant for the visit.    FOLLOW-UP/POST-OP   Nov 3, 2020    Zulay Rossi is a 40 year old female who is phoning in for her 1st post-operative follow-up visit.    Treatment to date:  1. Right wire-localized lumpectomy (10/22/2020) for atypia     HPI:    She underwent a right wire-localized lumpectomy on 10/22/2020 for atypia.  She is currently 2 week(s) post-op.  Final surgical pathology showed grade 1 ductal carcinoma in " situ measuring 1.2 cm in size, with uninvolved margins.    On the day of surgery, preoperatively, her Hb was found to be 7.  The plan was for her to follow up with her PCP regarding this new anemia prior to surgery, but because the surgery was low risk, we proceeded.    Since the procedure, she has been doing well. She denies any redness or swelling, or drainage from the incision, or fever/chills.  She has good range of motion and denies any upper extremity swelling.     INVESTIGATIONS:    Surgical Pathology (10/22/2020):  FINAL DIAGNOSIS:   A. RIGHT BREAST, RIGHT UPPER OUTER BREAST, WIRE-LOCALIZED LUMPECTOMY:   - DUCTAL CARCINOMA IN-SITU (DCIS), low nuclear grade (grade 1), cribriform and solid type, measuring 1.2 cm in   linear extent.   - No evidence of invasive malignancy identified.   - Margins are uninvolved by DCIS; closest margin is medial at 3 mm.   - Flat epithelial atypia.   - Calcifications associated with benign ducts, acini and stroma.   - Prior biopsy site changes.   - AJCC pathologic staging is pTis(DCIS) NX  ER+  [place]-    Assessment/Plan:  Diagnoses       Codes Comments    Ductal carcinoma in situ (DCIS) of right breast    -  Primary D05.11          ASSESSMENT:    Zulay Rossi is a 40 year old female with ductal carcinoma in situ of the RIGHT breast, diagnosed at lumpectomy for atypia.    She is healing well.  I recommended she start moisturizing and massaging the scar.     We reviewed the pathology today and a copy of the report was released on [employer]. We reviewed the margins were uninvolved; no further surgery is indicated.  We discussed radiation is indicated to complete breast conservation therapy for DCIS.  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  We discussed adjuvant endocrine therapy is indicated given the DCIS is ER+, to reduce a risk of recurrence and risk of contralateral breast cancer.  She already has an  appointment with Dr Brennan for the preoperative anemia; I will contact him regarding her new diagnosis of DCIS as well.    We discussed surveillance involves annual mammogram and every 6 month physical exam. I will see her in 6 months. We reviewed that the first mammogram is typically performed at least 6 months after radiation therapy is completed.    All of the above was discussed and all questions were answered.    PLAN:  1. Radiation oncology - patient will make an appointment outside of the [facility] system  2. Medical oncology consultation  3. Follow up with me in 6 months    Phone call duration: 10 minutes    Sarah Cohen MD MSc FRCSC FACS    Division of Surgical Oncology  [facility]       Again, thank you for allowing me to participate in the care of your patient.        Sincerely,        Sarah Cohen MD

## 2020-11-03 NOTE — PROGRESS NOTES
"Zulay Rossi is a 40 year old female who is being evaluated via a billable telephone visit.      The patient has been notified of following:     \"This telephone visit will be conducted via a call between you and your physician/provider. We have found that certain health care needs can be provided without the need for a physical exam.  This service lets us provide the care you need with a short phone conversation.  If a prescription is necessary we can send it directly to your pharmacy.  If lab work is needed we can place an order for that and you can then stop by our lab to have the test done at a later time.    Telephone visits are billed at different rates depending on your insurance coverage. During this emergency period, for some insurers they may be billed the same as an in-person visit.  Please reach out to your insurance provider with any questions.    If during the course of the call the physician/provider feels a telephone visit is not appropriate, you will not be charged for this service.\"    Patient has given verbal consent for Telephone visit?  Yes    What phone number would you like to be contacted at? [phone]     How would you like to obtain your AVS? Douglas De La Paz LPN        "

## 2020-11-05 ENCOUNTER — TRANSFERRED RECORDS (OUTPATIENT)
Dept: HEALTH INFORMATION MANAGEMENT | Facility: CLINIC | Age: 40
End: 2020-11-05

## 2020-11-06 DIAGNOSIS — E87.6 LOW SERUM POTASSIUM LEVEL: ICD-10-CM

## 2020-11-06 RX ORDER — POTASSIUM CHLORIDE 1500 MG/1
20 TABLET, EXTENDED RELEASE ORAL 2 TIMES DAILY
Qty: 60 TABLET | Refills: 0 | Status: SHIPPED | OUTPATIENT
Start: 2020-11-06 | End: 2021-01-14

## 2020-11-06 NOTE — TELEPHONE ENCOUNTER
Routing refill request to provider for review/approval because:  Labs out of range:    Potassium   Date Value Ref Range Status   10/22/2020 3.2 (L) 3.4 - 5.3 mmol/L Final     Radha Navarrete RN

## 2020-11-06 NOTE — LETTER
November 9, 2020    Zulay Rossi  [address]  [place] [address]    Dear Zulay,       We recently received a refill request for potassium chloride ER (KLOR-CON) 20 MEQ CR tablet.  We have refilled this for a one time 30 day supply only because you are due for a:      Lab appointment        Please call at your earliest convenience so that there will not be a delay with your future refills.        Thank you,   Your [facility] Team/  [phone]

## 2020-11-08 ENCOUNTER — TRANSFERRED RECORDS (OUTPATIENT)
Dept: HEALTH INFORMATION MANAGEMENT | Facility: CLINIC | Age: 40
End: 2020-11-08

## 2020-11-08 LAB
CREAT SERPL-MCNC: 1.29 MG/DL (ref 0.57–1.11)
GFR SERPL CREATININE-BSD FRML MDRD: 46 ML/MIN/1.73M2
GLUCOSE SERPL-MCNC: 601 MG/DL (ref 65–100)
HBA1C MFR BLD: 5.3 % (ref 0–5.7)
INR PPP: 1.4
POTASSIUM SERPL-SCNC: 4.2 MMOL/L (ref 3.5–5)

## 2020-11-12 ENCOUNTER — TELEPHONE (OUTPATIENT)
Dept: FAMILY MEDICINE | Facility: CLINIC | Age: 40
End: 2020-11-12

## 2020-11-12 DIAGNOSIS — F10.920 ALCOHOLIC INTOXICATION WITHOUT COMPLICATION (H): Primary | ICD-10-CM

## 2020-11-12 NOTE — TELEPHONE ENCOUNTER
Left message on answering machine for patient to call back to [phone].  Barbara Overton BSN, RN

## 2020-11-12 NOTE — TELEPHONE ENCOUNTER
PLEASE CALL PATIENT:         I got a note that patient was in hospital and had drank on November 5th. She previously did not schedule with the chemical dependency referral I placed. I would strongly suggest she do this, does she agree to schedule this time?    Also she needs to make sure to follow with gastroenterology for her very low hemoglobin and bleeding issues. And with endocrine for her poor sugar control.         Dania Munoz PA-C

## 2020-11-13 ENCOUNTER — PATIENT OUTREACH (OUTPATIENT)
Dept: CARE COORDINATION | Facility: CLINIC | Age: 40
End: 2020-11-13

## 2020-11-13 NOTE — LETTER
[place] CARE COORDINATION  [address] [address] [facility] [address]    November 16, 2020    Zulay Rossi  [address] [place] [place] [facility] [place] [address]      Dear Zulay,    I am a clinic community health worker who works with Dania Munoz PA-C at [facility]. I have been trying to reach you recently to introduce Clinic Care Coordination and to see if there was anything I could assist you with.  Below is a description of clinic care coordination and how I can further assist you.      The clinic care coordination team is made up of a registered nurse,  and community health worker who understand the health care system. The goal of clinic care coordination is to help you manage your health and improve access to the health care system in the most efficient manner. The team can assist you in meeting your health care goals by providing education, coordinating services, strengthening the communication among your providers and supporting you with any resource needs.    Please feel free to contact me at [phone] with any questions or concerns. We are focused on providing you with the highest-quality healthcare experience possible and that all starts with you.     Sincerely,     Sandy MEREDITH, Community Health Worker  Clinic Care Coordination  [facility] : [facility]  Phone: [phone]

## 2020-11-13 NOTE — PROGRESS NOTES
Clinic Care Coordination Contact  [facility]/Voicemail       Clinical Data: CHW Outreach  Outreach attempted x 1. Left message on patient's voicemail with call back information and requested return call.    Plan: CHW will try to reach patient again in 1-2 business days.    Sandy MEREDITH Community Health Worker  Clinic Care Coordination  [facility] : [place]  Phone: [phone]    Reason for Referral: Care Transition: Inpatient to outpatient

## 2020-11-16 NOTE — TELEPHONE ENCOUNTER
Pt notified of provider message as written.  She states the note said she was drinking but she was not. She is willing to met with chemical dependency and will call and schedule that, she states she has the number.      Pt has endoscopy and colonoscopy scheduled on 11/24 and has hematology appointment schedule don 12/11.    To provider as mary.  Barbara STEELEN, RN

## 2020-11-16 NOTE — PROGRESS NOTES
Clinic Care Coordination Contact  [facility]/Voicemail       Clinical Data: CHW Outreach  Outreach attempted x 2. Left message on patient's voicemail with call back information and requested return call.    Plan: CHW will send care coordination introduction letter with care coordinator contact information and explanation of care coordination services via [employer].     CHW will do no further outreaches at this time.    Sandy MEREDITH Community Health Worker  Clinic Care Coordination  [facility] : [place]  Phone: [phone]    Reason for Referral: Care Transition: Inpatient to outpatient

## 2020-11-24 ENCOUNTER — TRANSFERRED RECORDS (OUTPATIENT)
Dept: HEALTH INFORMATION MANAGEMENT | Facility: CLINIC | Age: 40
End: 2020-11-24

## 2020-11-25 ENCOUNTER — TRANSFERRED RECORDS (OUTPATIENT)
Dept: HEALTH INFORMATION MANAGEMENT | Facility: CLINIC | Age: 40
End: 2020-11-25

## 2020-11-25 ENCOUNTER — VIRTUAL VISIT (OUTPATIENT)
Dept: ONCOLOGY | Facility: CLINIC | Age: 40
End: 2020-11-25
Attending: PHYSICIAN ASSISTANT
Payer: MEDICAID

## 2020-11-25 ENCOUNTER — PRE VISIT (OUTPATIENT)
Dept: ONCOLOGY | Facility: CLINIC | Age: 40
End: 2020-11-25

## 2020-11-25 DIAGNOSIS — D64.9 ANEMIA, UNSPECIFIED TYPE: ICD-10-CM

## 2020-11-25 PROCEDURE — 99204 OFFICE O/P NEW MOD 45 MIN: CPT | Mod: GT | Performed by: INTERNAL MEDICINE
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
## 2020-11-25 NOTE — PATIENT INSTRUCTIONS
Cont radiation    Labs in the next few days    Think about Tamoxifen    See me back on 12/22/2020 and then we will finalize the plan

## 2020-11-25 NOTE — NURSING NOTE
"Zulay Rossi is a 40 year old female who is being evaluated via a billable video visit.      The patient has been notified of following:     \"This video visit will be conducted via a call between you and your physician/provider. We have found that certain health care needs can be provided without the need for an in-person physical exam.  This service lets us provide the care you need with a video conversation.  If a prescription is necessary we can send it directly to your pharmacy.  If lab work is needed we can place an order for that and you can then stop by our lab to have the test done at a later time.    Video visits are billed at different rates depending on your insurance coverage.  Please reach out to your insurance provider with any questions.    If during the course of the call the physician/provider feels a video visit is not appropriate, you will not be charged for this service.\"    Patient has given verbal consent for Video visit? Yes  How would you like to obtain your AVS? MyChart  If you are dropped from the video visit, the video invite should be resent to: Text to cell phone: [phone]  Will anyone else be joining your video visit? No      Video-Visit Details    Type of service:  Video Visit    Originating Location (pt. Location): Home    Distant Location (provider location):  [facility]     Platform used for Video Visit: Mindy Fisher CMA        "
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
## 2020-12-07 NOTE — TELEPHONE ENCOUNTER
RECORDS STATUS - ALL OTHER DIAGNOSIS      RECORDS RECEIVED FROM: [facility]   DATE RECEIVED: 12/22/2020    NOTES STATUS DETAILS   OFFICE NOTE from referring provider Complete Dania Munoz PA-C   OFFICE NOTE from medical oncologist Complete 11/25/2020 Virtual Visit    DISCHARGE SUMMARY from hospital     DISCHARGE REPORT from the ER Complete 11/8/2020 Anemia    OPERATIVE REPORT N/A    MEDICATION LIST Complete [facility]   CLINICAL TRIAL TREATMENTS TO DATE     LABS     PATHOLOGY REPORTS N/A    ANYTHING RELATED TO DIAGNOSIS Complete Labs last updated on 11/25/2020    GENONOMIC TESTING     TYPE:     IMAGING (NEED IMAGES & REPORT)     CT SCANS     MRI     MAMMO     ULTRASOUND     PET

## 2020-12-17 ENCOUNTER — TRANSFERRED RECORDS (OUTPATIENT)
Dept: HEALTH INFORMATION MANAGEMENT | Facility: CLINIC | Age: 40
End: 2020-12-17

## 2020-12-21 DIAGNOSIS — D64.9 ANEMIA, UNSPECIFIED TYPE: ICD-10-CM

## 2020-12-21 DIAGNOSIS — R74.8 ELEVATED LIVER ENZYMES: ICD-10-CM

## 2020-12-21 DIAGNOSIS — E87.6 LOW SERUM POTASSIUM LEVEL: ICD-10-CM

## 2020-12-21 LAB
ALBUMIN SERPL-MCNC: 2.8 G/DL (ref 3.4–5)
ALP SERPL-CCNC: 165 U/L (ref 40–150)
ALT SERPL W P-5'-P-CCNC: 108 U/L (ref 0–50)
ANION GAP SERPL CALCULATED.3IONS-SCNC: 7 MMOL/L (ref 3–14)
AST SERPL W P-5'-P-CCNC: 460 U/L (ref 0–45)
BASOPHILS # BLD AUTO: 0 10E9/L (ref 0–0.2)
BASOPHILS NFR BLD AUTO: 0.6 %
BILIRUB SERPL-MCNC: 0.6 MG/DL (ref 0.2–1.3)
BUN SERPL-MCNC: 2 MG/DL (ref 7–30)
CALCIUM SERPL-MCNC: 7.6 MG/DL (ref 8.5–10.1)
CHLORIDE SERPL-SCNC: 102 MMOL/L (ref 94–109)
CO2 SERPL-SCNC: 28 MMOL/L (ref 20–32)
CREAT SERPL-MCNC: 0.63 MG/DL (ref 0.52–1.04)
DIFFERENTIAL METHOD BLD: ABNORMAL
EOSINOPHIL # BLD AUTO: 0 10E9/L (ref 0–0.7)
EOSINOPHIL NFR BLD AUTO: 0.6 %
ERYTHROCYTE [DISTWIDTH] IN BLOOD BY AUTOMATED COUNT: 20.5 % (ref 10–15)
FERRITIN SERPL-MCNC: 101 NG/ML (ref 12–150)
FOLATE SERPL-MCNC: 27.2 NG/ML
GFR SERPL CREATININE-BSD FRML MDRD: >90 ML/MIN/{1.73_M2}
GLUCOSE SERPL-MCNC: 292 MG/DL (ref 70–99)
HCT VFR BLD AUTO: 32.7 % (ref 35–47)
HGB BLD-MCNC: 10.3 G/DL (ref 11.7–15.7)
IMM GRANULOCYTES # BLD: 0 10E9/L (ref 0–0.4)
IMM GRANULOCYTES NFR BLD: 0.3 %
IRON SATN MFR SERPL: 85 % (ref 15–46)
IRON SERPL-MCNC: 210 UG/DL (ref 35–180)
LDH SERPL L TO P-CCNC: 302 U/L (ref 81–234)
LYMPHOCYTES # BLD AUTO: 1.1 10E9/L (ref 0.8–5.3)
LYMPHOCYTES NFR BLD AUTO: 31.8 %
MCH RBC QN AUTO: 30.8 PG (ref 26.5–33)
MCHC RBC AUTO-ENTMCNC: 31.5 G/DL (ref 31.5–36.5)
MCV RBC AUTO: 98 FL (ref 78–100)
MONOCYTES # BLD AUTO: 0.3 10E9/L (ref 0–1.3)
MONOCYTES NFR BLD AUTO: 10.3 %
NEUTROPHILS # BLD AUTO: 1.9 10E9/L (ref 1.6–8.3)
NEUTROPHILS NFR BLD AUTO: 56.4 %
PLATELET # BLD AUTO: 107 10E9/L (ref 150–450)
POTASSIUM SERPL-SCNC: 4 MMOL/L (ref 3.4–5.3)
PROT SERPL-MCNC: 7.1 G/DL (ref 6.8–8.8)
RBC # BLD AUTO: 3.34 10E12/L (ref 3.8–5.2)
RETICS # AUTO: 66.1 10E9/L (ref 25–95)
RETICS/RBC NFR AUTO: 2 % (ref 0.5–2)
SODIUM SERPL-SCNC: 137 MMOL/L (ref 133–144)
TIBC SERPL-MCNC: 247 UG/DL (ref 240–430)
VIT B12 SERPL-MCNC: 596 PG/ML (ref 193–986)
WBC # BLD AUTO: 3.3 10E9/L (ref 4–11)

## 2020-12-21 PROCEDURE — 85025 COMPLETE CBC W/AUTO DIFF WBC: CPT | Performed by: PHYSICIAN ASSISTANT

## 2020-12-21 PROCEDURE — 85045 AUTOMATED RETICULOCYTE COUNT: CPT | Performed by: PHYSICIAN ASSISTANT

## 2020-12-21 PROCEDURE — 82728 ASSAY OF FERRITIN: CPT | Performed by: PHYSICIAN ASSISTANT

## 2020-12-21 PROCEDURE — 83010 ASSAY OF HAPTOGLOBIN QUANT: CPT | Performed by: PHYSICIAN ASSISTANT

## 2020-12-21 PROCEDURE — 82607 VITAMIN B-12: CPT | Performed by: PHYSICIAN ASSISTANT

## 2020-12-21 PROCEDURE — 85060 BLOOD SMEAR INTERPRETATION: CPT | Performed by: INTERNAL MEDICINE

## 2020-12-21 PROCEDURE — 83615 LACTATE (LD) (LDH) ENZYME: CPT | Performed by: PHYSICIAN ASSISTANT

## 2020-12-21 PROCEDURE — 83550 IRON BINDING TEST: CPT | Performed by: PHYSICIAN ASSISTANT

## 2020-12-21 PROCEDURE — 83540 ASSAY OF IRON: CPT | Performed by: PHYSICIAN ASSISTANT

## 2020-12-21 PROCEDURE — 36415 COLL VENOUS BLD VENIPUNCTURE: CPT | Performed by: PHYSICIAN ASSISTANT

## 2020-12-21 PROCEDURE — 80053 COMPREHEN METABOLIC PANEL: CPT | Performed by: PHYSICIAN ASSISTANT

## 2020-12-21 PROCEDURE — 82746 ASSAY OF FOLIC ACID SERUM: CPT | Performed by: INTERNAL MEDICINE

## 2020-12-22 ENCOUNTER — VIRTUAL VISIT (OUTPATIENT)
Dept: ONCOLOGY | Facility: CLINIC | Age: 40
End: 2020-12-22
Payer: MEDICAID

## 2020-12-22 ENCOUNTER — PRE VISIT (OUTPATIENT)
Dept: ONCOLOGY | Facility: CLINIC | Age: 40
End: 2020-12-22

## 2020-12-22 ENCOUNTER — TELEPHONE (OUTPATIENT)
Dept: FAMILY MEDICINE | Facility: CLINIC | Age: 40
End: 2020-12-22

## 2020-12-22 VITALS — BODY MASS INDEX: 20.61 KG/M2 | WEIGHT: 112 LBS | HEIGHT: 62 IN

## 2020-12-22 DIAGNOSIS — D64.9 ANEMIA, UNSPECIFIED TYPE: ICD-10-CM

## 2020-12-22 DIAGNOSIS — D05.11 DUCTAL CARCINOMA IN SITU (DCIS) OF RIGHT BREAST: Primary | ICD-10-CM

## 2020-12-22 DIAGNOSIS — K70.31 ALCOHOLIC CIRRHOSIS OF LIVER WITH ASCITES (H): ICD-10-CM

## 2020-12-22 LAB
COPATH REPORT: NORMAL
HAPTOGLOB SERPL-MCNC: 93 MG/DL (ref 32–197)

## 2020-12-22 PROCEDURE — 99214 OFFICE O/P EST MOD 30 MIN: CPT | Mod: GT | Performed by: INTERNAL MEDICINE

## 2020-12-22 RX ORDER — TAMOXIFEN CITRATE 20 MG/1
20 TABLET ORAL DAILY
Qty: 30 TABLET | Refills: 11 | Status: SHIPPED | OUTPATIENT
Start: 2020-12-22 | End: 2021-06-07

## 2020-12-22 ASSESSMENT — MIFFLIN-ST. JEOR: SCORE: 1136.28

## 2020-12-22 ASSESSMENT — PAIN SCALES - GENERAL: PAINLEVEL: MILD PAIN (3)

## 2020-12-22 NOTE — TELEPHONE ENCOUNTER
Left message on answering machine for patient to call back.  [phone]      Scheduling:  Please read result note to patient as below. Please assist in scheduling with internal medicine for follow up       Amina STEELEN, RN, CPN

## 2020-12-22 NOTE — TELEPHONE ENCOUNTER
PLEASE CALL PATIENT:   Dear Zulay,    Your test results are listed below.       Kidney function improved. Liver tests still poor. ast over 400.  Continue with gastroenterology for this.     She was to establish with internal medicine, please have her make a f/u appointment with them and continue with them for ongoing care as she has become too complicated for me to follow.             Sincerely,   Dania Munoz PA-C
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
## 2020-12-22 NOTE — NURSING NOTE
"Zulay Rossi is a 40 year old female who is being evaluated via a billable video visit.      The patient has been notified of following:     \"This video visit will be conducted via a call between you and your physician/provider. We have found that certain health care needs can be provided without the need for an in-person physical exam.  This service lets us provide the care you need with a video conversation.  If a prescription is necessary we can send it directly to your pharmacy.  If lab work is needed we can place an order for that and you can then stop by our lab to have the test done at a later time.    Video visits are billed at different rates depending on your insurance coverage.  Please reach out to your insurance provider with any questions.    If during the course of the call the physician/provider feels a video visit is not appropriate, you will not be charged for this service.\"    Patient has given verbal consent for Video visit? Yes  How would you like to obtain your AVS? MyChart  If you are dropped from the video visit, the video invite should be resent to: Text to cell phone: [phone]  Will anyone else be joining your video visit? No        Video-Visit Details    Type of service:  Video Visit    Originating Location (pt. Location): Home    Distant Location (provider location):  [facility]     Platform used for Video Visit: [employer]      SYMPTOM QUESTIONNAIRE    Pain: 3/10 right breast- radiation    Nausea/Vomiting: no    Mouth Sores: no    Shortness of Breath: no    Smoking: no    Fever or Chills: no    Hard Stools: no    Soft Stools: no    Weight Loss: no    Weakness: no    Burning, numbness or tingling in hands or feet: no    Problems with skin or swelling: redness- radiation site     Memory Loss: no    Anxiety or Depression: no    Trouble Sleeping: no    Elli Hernández LPN on 12/22/2020 at 3:51 PM              "

## 2020-12-22 NOTE — PATIENT INSTRUCTIONS
Start tamoxifen 20 mg daily  Follow up with Hepatology ( Liver doctor )    See me back in 3 months with labs prior

## 2021-01-08 ENCOUNTER — TELEPHONE (OUTPATIENT)
Dept: ENDOCRINOLOGY | Facility: CLINIC | Age: 41
End: 2021-01-08

## 2021-01-08 NOTE — TELEPHONE ENCOUNTER
Diabetes Education Scheduling Outreach #1:    Call to patient to schedule. Left message with phone number to call to schedule.    Plan for 2nd outreach attempt within 1 week.    Barbara Huynh  [facility]  Diabetes and Nutrition Scheduling

## 2021-01-14 ENCOUNTER — VIRTUAL VISIT (OUTPATIENT)
Dept: FAMILY MEDICINE | Facility: CLINIC | Age: 41
End: 2021-01-14
Payer: COMMERCIAL

## 2021-01-14 DIAGNOSIS — E10.9 TYPE 1 DIABETES MELLITUS WITHOUT COMPLICATION (H): ICD-10-CM

## 2021-01-14 DIAGNOSIS — K86.1 CHRONIC PANCREATITIS, UNSPECIFIED PANCREATITIS TYPE (H): Primary | ICD-10-CM

## 2021-01-14 DIAGNOSIS — D05.11 DUCTAL CARCINOMA IN SITU (DCIS) OF RIGHT BREAST: ICD-10-CM

## 2021-01-14 DIAGNOSIS — K70.10 ALCOHOLIC HEPATITIS WITHOUT ASCITES (H): ICD-10-CM

## 2021-01-14 PROCEDURE — 99214 OFFICE O/P EST MOD 30 MIN: CPT | Mod: 95 | Performed by: INTERNAL MEDICINE

## 2021-01-14 NOTE — PROGRESS NOTES
Zulay is a 40 year old who is being evaluated via a billable video visit.      How would you like to obtain your AVS? MyChart  If the video visit is dropped, the invitation should be resent by: Text to cell phone: [phone]  Will anyone else be joining your video visit? No      Video Start Time: 3:00 PM  Assessment & Plan     Chronic pancreatitis, unspecified pancreatitis type (H)  She is currently off all pain medications  She takes some Creon supplements    Type 1 diabetes mellitus without complication (H)  She sees endocrinologist  She has a shonda continuous monitor  She is on Lantus 8 units  Blood sugars have been generally very good    Ductal carcinoma in situ (DCIS) of right breast  Status post lumpectomy followed by radiation and chemotherapy    Alcoholic hepatitis without ascites  Has been abstaining from alcohol at least for the last 50 days  LFTs abnormal  Discussed about staying up from alcohol forever    Anemia:  No clear cause identified although she was found to have Olive-Orta tear at some point  Currently on Protonix which is going to finish shortly  I have reviewed the colonoscopy results but I do not have access to any EGD results  She sees [facility]  Not on aspirin    Gout  She takes indomethacin and colchicine as needed  I think it is better for her to stay off indomethacin because of low platelets              30 minutes spent on the date of the encounter doing chart review, review of outside records, review of test results, interpretation of tests, patient visit and documentation                No follow-ups on file.    Jarred Cavanaugh MD  [facility]    Subjective     Zulay is a 40 year old who presents to clinic today for the following health issues     HPI   Wants to establish care with a new PCP          Review of Systems   Constitutional, HEENT, cardiovascular, pulmonary, gi and gu systems are negative, except as otherwise noted.      Objective            Vitals:  No vitals were obtained today due to virtual visit.    Physical Exam   GENERAL: Healthy, alert and no distress  EYES: Eyes grossly normal to inspection.  No discharge or erythema, or obvious scleral/conjunctival abnormalities.  RESP: No audible wheeze, cough, or visible cyanosis.  No visible retractions or increased work of breathing.    SKIN: Visible skin clear. No significant rash, abnormal pigmentation or lesions.  NEURO: Cranial nerves grossly intact.  Mentation and speech appropriate for age.  PSYCH: Mentation appears normal, affect normal/bright, judgement and insight intact, normal speech and appearance well-groomed.                Video-Visit Details    Type of service:  Video Visit    Video End Time:3:20PM    Originating Location (pt. Location): Home    Distant Location (provider location):  [facility]     Platform used for Video Visit: [employer]

## 2021-01-15 ENCOUNTER — HEALTH MAINTENANCE LETTER (OUTPATIENT)
Age: 41
End: 2021-01-15

## 2021-01-15 NOTE — TELEPHONE ENCOUNTER
Diabetes Education Scheduling Outreach #2:    Call to patient to schedule. Left message with phone number to call to schedule.    Barbara Huynh  [facility]  Diabetes and Nutrition Scheduling

## 2021-01-19 ENCOUNTER — TELEPHONE (OUTPATIENT)
Dept: ONCOLOGY | Facility: CLINIC | Age: 41
End: 2021-01-19

## 2021-01-19 NOTE — TELEPHONE ENCOUNTER
attempted to reach the patient and schedule 3 month virtual visit with labs prior, with Dr. Brennan due in March 2021.    Writer alvarado.    [facility]   [phone]

## 2021-01-28 ENCOUNTER — IMMUNIZATION (OUTPATIENT)
Dept: NURSING | Facility: CLINIC | Age: 41
End: 2021-01-28
Payer: COMMERCIAL

## 2021-01-28 PROCEDURE — 0001A PR COVID VAC PFIZER DIL RECON 30 MCG/0.3 ML IM: CPT

## 2021-01-28 PROCEDURE — 91300 PR COVID VAC PFIZER DIL RECON 30 MCG/0.3 ML IM: CPT

## 2021-02-18 ENCOUNTER — IMMUNIZATION (OUTPATIENT)
Dept: NURSING | Facility: CLINIC | Age: 41
End: 2021-02-18
Attending: FAMILY MEDICINE
Payer: COMMERCIAL

## 2021-02-18 PROCEDURE — 0002A PR COVID VAC PFIZER DIL RECON 30 MCG/0.3 ML IM: CPT

## 2021-02-18 PROCEDURE — 91300 PR COVID VAC PFIZER DIL RECON 30 MCG/0.3 ML IM: CPT

## 2021-03-10 NOTE — TELEPHONE ENCOUNTER
[address] [place] [address] advised America Garcia at [facility] that the pt had been D/C home and needed F/U by [employer]. America Garcia stated he would let his supervisor aware of her D/C home and F/U needed. Gege Kyle the pt's name and .      Aimee Mcintosh RN  03/10/21 1396 RX brought to the  for .Susan Ortega MA/TC

## 2021-03-23 ENCOUNTER — TELEPHONE (OUTPATIENT)
Dept: FAMILY MEDICINE | Facility: CLINIC | Age: 41
End: 2021-03-23

## 2021-03-23 ENCOUNTER — TRANSFERRED RECORDS (OUTPATIENT)
Dept: HEALTH INFORMATION MANAGEMENT | Facility: CLINIC | Age: 41
End: 2021-03-23

## 2021-03-23 ENCOUNTER — VIRTUAL VISIT (OUTPATIENT)
Dept: ONCOLOGY | Facility: CLINIC | Age: 41
End: 2021-03-23
Payer: COMMERCIAL

## 2021-03-23 VITALS — BODY MASS INDEX: 20.24 KG/M2 | HEIGHT: 62 IN | WEIGHT: 110 LBS

## 2021-03-23 DIAGNOSIS — D05.11 DUCTAL CARCINOMA IN SITU (DCIS) OF RIGHT BREAST: ICD-10-CM

## 2021-03-23 DIAGNOSIS — D64.9 ANEMIA, UNSPECIFIED TYPE: ICD-10-CM

## 2021-03-23 DIAGNOSIS — K70.31 ALCOHOLIC CIRRHOSIS OF LIVER WITH ASCITES (H): ICD-10-CM

## 2021-03-23 DIAGNOSIS — E78.1 HIGH BLOOD TRIGLYCERIDES: ICD-10-CM

## 2021-03-23 DIAGNOSIS — K70.31 ALCOHOLIC CIRRHOSIS OF LIVER WITH ASCITES (H): Primary | ICD-10-CM

## 2021-03-23 LAB
ALBUMIN SERPL-MCNC: 2 G/DL (ref 3.4–5)
ALP SERPL-CCNC: 229 U/L (ref 40–150)
ALT SERPL W P-5'-P-CCNC: 43 U/L (ref 0–50)
ANION GAP SERPL CALCULATED.3IONS-SCNC: 9 MMOL/L (ref 3–14)
AST SERPL W P-5'-P-CCNC: 357 U/L (ref 0–45)
BASOPHILS # BLD AUTO: 0 10E9/L (ref 0–0.2)
BASOPHILS NFR BLD AUTO: 0.4 %
BILIRUB SERPL-MCNC: 1.6 MG/DL (ref 0.2–1.3)
BUN SERPL-MCNC: 5 MG/DL (ref 7–30)
CALCIUM SERPL-MCNC: 6.9 MG/DL (ref 8.5–10.1)
CHLORIDE SERPL-SCNC: 104 MMOL/L (ref 94–109)
CO2 SERPL-SCNC: 26 MMOL/L (ref 20–32)
CREAT SERPL-MCNC: 0.58 MG/DL (ref 0.52–1.04)
DIFFERENTIAL METHOD BLD: ABNORMAL
EOSINOPHIL # BLD AUTO: 0 10E9/L (ref 0–0.7)
EOSINOPHIL NFR BLD AUTO: 0.2 %
ERYTHROCYTE [DISTWIDTH] IN BLOOD BY AUTOMATED COUNT: 13.3 % (ref 10–15)
GFR SERPL CREATININE-BSD FRML MDRD: >90 ML/MIN/{1.73_M2}
GLUCOSE SERPL-MCNC: 191 MG/DL (ref 70–99)
HCT VFR BLD AUTO: 29.1 % (ref 35–47)
HGB BLD-MCNC: 10.1 G/DL (ref 11.7–15.7)
IMM GRANULOCYTES # BLD: 0 10E9/L (ref 0–0.4)
IMM GRANULOCYTES NFR BLD: 0.9 %
LYMPHOCYTES # BLD AUTO: 1.5 10E9/L (ref 0.8–5.3)
LYMPHOCYTES NFR BLD AUTO: 33.6 %
MCH RBC QN AUTO: 37.1 PG (ref 26.5–33)
MCHC RBC AUTO-ENTMCNC: 34.7 G/DL (ref 31.5–36.5)
MCV RBC AUTO: 107 FL (ref 78–100)
MONOCYTES # BLD AUTO: 0.7 10E9/L (ref 0–1.3)
MONOCYTES NFR BLD AUTO: 14.8 %
NEUTROPHILS # BLD AUTO: 2.2 10E9/L (ref 1.6–8.3)
NEUTROPHILS NFR BLD AUTO: 50.1 %
PLATELET # BLD AUTO: 66 10E9/L (ref 150–450)
POTASSIUM SERPL-SCNC: 2.3 MMOL/L (ref 3.4–5.3)
PROT SERPL-MCNC: 8.4 G/DL (ref 6.8–8.8)
RBC # BLD AUTO: 2.72 10E12/L (ref 3.8–5.2)
SODIUM SERPL-SCNC: 139 MMOL/L (ref 133–144)
WBC # BLD AUTO: 4.5 10E9/L (ref 4–11)

## 2021-03-23 PROCEDURE — 99214 OFFICE O/P EST MOD 30 MIN: CPT | Mod: 95 | Performed by: INTERNAL MEDICINE

## 2021-03-23 PROCEDURE — 80053 COMPREHEN METABOLIC PANEL: CPT | Performed by: INTERNAL MEDICINE

## 2021-03-23 PROCEDURE — 36415 COLL VENOUS BLD VENIPUNCTURE: CPT | Performed by: INTERNAL MEDICINE

## 2021-03-23 PROCEDURE — 85025 COMPLETE CBC W/AUTO DIFF WBC: CPT | Performed by: INTERNAL MEDICINE

## 2021-03-23 RX ORDER — ATORVASTATIN CALCIUM 10 MG/1
TABLET, FILM COATED ORAL
Qty: 90 TABLET | Refills: 0 | OUTPATIENT
Start: 2021-03-23

## 2021-03-23 ASSESSMENT — MIFFLIN-ST. JEOR: SCORE: 1122.21

## 2021-03-23 ASSESSMENT — PAIN SCALES - GENERAL: PAINLEVEL: NO PAIN (0)
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
## 2021-03-23 NOTE — TELEPHONE ENCOUNTER
Routing refill request to provider for review/approval because:  Drug not active on patient's medication list.  Last Virtual appointment with PCP 9/3/20.    Ronel Garcia, CEDRICN, RN

## 2021-03-23 NOTE — TELEPHONE ENCOUNTER
Patient was referred to schedule with internal medicine to establish care quite a while ago due to complex case .   She would need a visit with me if wanting to discuss this, we had stopped due to her liver enzymes.  I have not seen her in a long time.  If she does have a new pcp, please have her ask them.     Dania Munoz PA-C
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
## 2021-03-23 NOTE — PATIENT INSTRUCTIONS
I would recommend you go to ER for very low Potassium    Cont Tamoxifen    reger to [facility] ( Hepatology )  Refer to genetic counselor    Repeat labs in 2 months and s ee me after that

## 2021-03-23 NOTE — NURSING NOTE
Zulay is a 41 year old who is being evaluated via a billable video visit.      How would you like to obtain your AVS? MyChart  If the video visit is dropped, the invitation should be resent by: Text to cell phone: [phone]  Will anyone else be joining your video visit? No      Video-Visit Details    Type of service:  Video Vis    Originating Location (pt. Location): Home    Distant Location (provider location):  [facility]     Platform used for Video Visit: Well     SYMPTOM QUESTIONNAIRE    Pain: no    Nausea/Vomiting: YES, Nausea    Mouth Sores: no    Shortness of Breath: no    Smoking: no    Fever or Chills: no    Hard Stools: no    Loose Stools: no    Weight Loss: no    Weakness: YES, fatigue    Burning, numbness or tingling in hands or feet: no    Problems with skin or swelling: no    Memory Loss: no    Anxiety or Depression: YES, Depression (2/10 on depression scale)    Trouble Sleeping: no    CONCERNS:  Nausea and headaches, used to take Tamoxifen in the am has now switched to taking it in the pm.      Altagracia Verdugo CMA

## 2021-03-24 ENCOUNTER — TRANSFERRED RECORDS (OUTPATIENT)
Dept: HEALTH INFORMATION MANAGEMENT | Facility: CLINIC | Age: 41
End: 2021-03-24

## 2021-04-01 ENCOUNTER — MYC MEDICAL ADVICE (OUTPATIENT)
Dept: ENDOCRINOLOGY | Facility: CLINIC | Age: 41
End: 2021-04-01

## 2021-04-01 ENCOUNTER — TELEPHONE (OUTPATIENT)
Dept: ONCOLOGY | Facility: CLINIC | Age: 41
End: 2021-04-01
Payer: COMMERCIAL

## 2021-04-01 DIAGNOSIS — E11.65 TYPE 2 DIABETES MELLITUS WITH HYPERGLYCEMIA, WITH LONG-TERM CURRENT USE OF INSULIN (H): ICD-10-CM

## 2021-04-01 DIAGNOSIS — Z79.4 TYPE 2 DIABETES MELLITUS WITH HYPERGLYCEMIA, WITH LONG-TERM CURRENT USE OF INSULIN (H): ICD-10-CM

## 2021-04-01 NOTE — TELEPHONE ENCOUNTER
Writer attempted to reach the patient and scheduled referrals as well as follow up with .    Unable to leave a voicemail, caller not accepting calls at this time.    Bria Maddox  [facility]  Cancer/Infusion Center   [phone]

## 2021-04-01 NOTE — CONFIDENTIAL NOTE
Signed Prescriptions:                        Disp   Refills    insulin pen needle (32G X 4 MM) 32G X 4 MM*100 ea*0        Sig: Use 1 pen needles daily or as directed.  Authorizing Provider: BRISA DEJESUS  Ordering User: MICHELLE ROWELL RN refilled medication per [facility] Refill Protocol.     Michelle Rowell RN

## 2021-04-20 DIAGNOSIS — E11.65 TYPE 2 DIABETES MELLITUS WITH HYPERGLYCEMIA, WITH LONG-TERM CURRENT USE OF INSULIN (H): ICD-10-CM

## 2021-04-20 DIAGNOSIS — Z79.4 TYPE 2 DIABETES MELLITUS WITH HYPERGLYCEMIA, WITH LONG-TERM CURRENT USE OF INSULIN (H): ICD-10-CM

## 2021-04-20 NOTE — TELEPHONE ENCOUNTER
Routing refill request to provider for review/approval because:      Last visit was on 9/25/20. Endocrine MA, please call patient to assist with scheduling f/u visit.    Agustin BARNETT RN....4/20/2021 1:29 PM

## 2021-04-22 RX ORDER — INSULIN GLARGINE 100 [IU]/ML
INJECTION, SOLUTION SUBCUTANEOUS
Qty: 15 ML | Refills: 1 | Status: SHIPPED | OUTPATIENT
Start: 2021-04-22 | End: 2021-08-10

## 2021-04-22 NOTE — TELEPHONE ENCOUNTER
Called patient and left VM, informed that she is overdue for a follow up. Requested she call [facility] care team to make an appt. Clinic number given.     Mikaela MABRY MA

## 2021-05-09 ENCOUNTER — HEALTH MAINTENANCE LETTER (OUTPATIENT)
Age: 41
End: 2021-05-09

## 2021-05-11 DIAGNOSIS — E11.65 TYPE 2 DIABETES MELLITUS WITH HYPERGLYCEMIA, WITH LONG-TERM CURRENT USE OF INSULIN (H): ICD-10-CM

## 2021-05-11 DIAGNOSIS — Z79.4 TYPE 2 DIABETES MELLITUS WITH HYPERGLYCEMIA, WITH LONG-TERM CURRENT USE OF INSULIN (H): ICD-10-CM

## 2021-05-11 RX ORDER — INSULIN ASPART 100 [IU]/ML
1-4 INJECTION, SOLUTION INTRAVENOUS; SUBCUTANEOUS
Qty: 15 ML | Refills: 1 | Status: SHIPPED | OUTPATIENT
Start: 2021-05-11 | End: 2021-07-05

## 2021-05-11 NOTE — TELEPHONE ENCOUNTER
Routing refill request to provider for review/approval because:  Labs not current:  HgbA1c    Francisca Zepeda RN

## 2021-05-26 DIAGNOSIS — R11.0 NAUSEA: ICD-10-CM

## 2021-05-27 RX ORDER — ONDANSETRON 8 MG/1
TABLET, FILM COATED ORAL
Qty: 9 TABLET | Refills: 26 | Status: SHIPPED | OUTPATIENT
Start: 2021-05-27 | End: 2022-01-05

## 2021-06-03 ENCOUNTER — TELEPHONE (OUTPATIENT)
Dept: FAMILY MEDICINE | Facility: CLINIC | Age: 41
End: 2021-06-03

## 2021-06-03 NOTE — TELEPHONE ENCOUNTER
Called MACIEL Bryan with [facility].    Gave message per Dr. Cavanaugh.   He states OT and Physical Therapy will be calling back with their own orders.      Ronel Dawson RN, [facility]

## 2021-06-03 NOTE — TELEPHONE ENCOUNTER
Please call [facility] [phone] and give the following orders  OK for skilled nursing PT and OT once a week for 3 weeks

## 2021-06-03 NOTE — TELEPHONE ENCOUNTER
.Reason for call:  Order   Order or referral being requested: skilled nursing, PT and OT  Reason for request: evaluate and treat  Date needed: as soon as possible  Has the patient been seen by the PCP for this problem? YES    Additional comments: 1x a week for 3 weeks call [phone]    Phone number to reach patient:  Home number on file [phone] (home)    Best Time:  anytime    Can we leave a detailed message on this number?  YES    Travel screening: Negative

## 2021-06-07 ENCOUNTER — OFFICE VISIT (OUTPATIENT)
Dept: FAMILY MEDICINE | Facility: CLINIC | Age: 41
End: 2021-06-07
Payer: COMMERCIAL

## 2021-06-07 VITALS
HEART RATE: 84 BPM | SYSTOLIC BLOOD PRESSURE: 134 MMHG | OXYGEN SATURATION: 100 % | WEIGHT: 109 LBS | TEMPERATURE: 98.2 F | RESPIRATION RATE: 16 BRPM | BODY MASS INDEX: 19.94 KG/M2 | DIASTOLIC BLOOD PRESSURE: 84 MMHG

## 2021-06-07 DIAGNOSIS — E10.9 TYPE 1 DIABETES MELLITUS WITHOUT COMPLICATION (H): ICD-10-CM

## 2021-06-07 DIAGNOSIS — D53.0 ANEMIA DUE TO PROTEIN DEFICIENCY: ICD-10-CM

## 2021-06-07 DIAGNOSIS — D69.6 THROMBOCYTOPENIA (H): ICD-10-CM

## 2021-06-07 DIAGNOSIS — K70.31 ALCOHOLIC CIRRHOSIS OF LIVER WITH ASCITES (H): Primary | ICD-10-CM

## 2021-06-07 LAB
ALBUMIN SERPL-MCNC: 2.5 G/DL (ref 3.4–5)
ALP SERPL-CCNC: 86 U/L (ref 40–150)
ALT SERPL W P-5'-P-CCNC: 32 U/L (ref 0–50)
ANION GAP SERPL CALCULATED.3IONS-SCNC: 6 MMOL/L (ref 3–14)
AST SERPL W P-5'-P-CCNC: 47 U/L (ref 0–45)
BASOPHILS # BLD AUTO: 0 10E9/L (ref 0–0.2)
BASOPHILS NFR BLD AUTO: 0.2 %
BILIRUB SERPL-MCNC: 1.5 MG/DL (ref 0.2–1.3)
BUN SERPL-MCNC: 9 MG/DL (ref 7–30)
CALCIUM SERPL-MCNC: 8.3 MG/DL (ref 8.5–10.1)
CHLORIDE SERPL-SCNC: 111 MMOL/L (ref 94–109)
CO2 SERPL-SCNC: 22 MMOL/L (ref 20–32)
CREAT SERPL-MCNC: 0.62 MG/DL (ref 0.52–1.04)
DIFFERENTIAL METHOD BLD: ABNORMAL
EOSINOPHIL # BLD AUTO: 0.1 10E9/L (ref 0–0.7)
EOSINOPHIL NFR BLD AUTO: 1 %
ERYTHROCYTE [DISTWIDTH] IN BLOOD BY AUTOMATED COUNT: 18.7 % (ref 10–15)
GFR SERPL CREATININE-BSD FRML MDRD: >90 ML/MIN/{1.73_M2}
GLUCOSE SERPL-MCNC: 94 MG/DL (ref 70–99)
HCT VFR BLD AUTO: 27.2 % (ref 35–47)
HGB BLD-MCNC: 8.7 G/DL (ref 11.7–15.7)
INR PPP: 1.45 (ref 0.86–1.14)
LYMPHOCYTES # BLD AUTO: 1.4 10E9/L (ref 0.8–5.3)
LYMPHOCYTES NFR BLD AUTO: 24.8 %
MCH RBC QN AUTO: 34.5 PG (ref 26.5–33)
MCHC RBC AUTO-ENTMCNC: 32 G/DL (ref 31.5–36.5)
MCV RBC AUTO: 108 FL (ref 78–100)
MONOCYTES # BLD AUTO: 0.6 10E9/L (ref 0–1.3)
MONOCYTES NFR BLD AUTO: 10 %
NEUTROPHILS # BLD AUTO: 3.7 10E9/L (ref 1.6–8.3)
NEUTROPHILS NFR BLD AUTO: 64 %
PLATELET # BLD AUTO: 150 10E9/L (ref 150–450)
POTASSIUM SERPL-SCNC: 3.9 MMOL/L (ref 3.4–5.3)
PROT SERPL-MCNC: 6.7 G/DL (ref 6.8–8.8)
RBC # BLD AUTO: 2.52 10E12/L (ref 3.8–5.2)
SODIUM SERPL-SCNC: 139 MMOL/L (ref 133–144)
WBC # BLD AUTO: 5.8 10E9/L (ref 4–11)

## 2021-06-07 PROCEDURE — 99214 OFFICE O/P EST MOD 30 MIN: CPT | Performed by: INTERNAL MEDICINE

## 2021-06-07 PROCEDURE — 36415 COLL VENOUS BLD VENIPUNCTURE: CPT | Performed by: INTERNAL MEDICINE

## 2021-06-07 PROCEDURE — 80053 COMPREHEN METABOLIC PANEL: CPT | Performed by: INTERNAL MEDICINE

## 2021-06-07 PROCEDURE — 85610 PROTHROMBIN TIME: CPT | Performed by: INTERNAL MEDICINE

## 2021-06-07 PROCEDURE — 85025 COMPLETE CBC W/AUTO DIFF WBC: CPT | Performed by: INTERNAL MEDICINE

## 2021-06-07 RX ORDER — SERTRALINE HYDROCHLORIDE 100 MG/1
50 TABLET, FILM COATED ORAL DAILY
Qty: 90 TABLET | Refills: 0 | COMMUNITY
Start: 2021-06-07

## 2021-06-07 RX ORDER — SPIRONOLACTONE 50 MG/1
50 TABLET, FILM COATED ORAL DAILY
Qty: 30 TABLET | Refills: 1 | Status: SHIPPED | OUTPATIENT
Start: 2021-06-07 | End: 2022-01-05

## 2021-06-07 RX ORDER — FUROSEMIDE 20 MG
20 TABLET ORAL DAILY
Qty: 30 TABLET | Refills: 1 | Status: SHIPPED | OUTPATIENT
Start: 2021-06-07

## 2021-06-07 ASSESSMENT — ASTHMA QUESTIONNAIRES
QUESTION_5 LAST FOUR WEEKS HOW WOULD YOU RATE YOUR ASTHMA CONTROL: COMPLETELY CONTROLLED
ACT_TOTALSCORE: 25
QUESTION_1 LAST FOUR WEEKS HOW MUCH OF THE TIME DID YOUR ASTHMA KEEP YOU FROM GETTING AS MUCH DONE AT WORK, SCHOOL OR AT HOME: NONE OF THE TIME
QUESTION_2 LAST FOUR WEEKS HOW OFTEN HAVE YOU HAD SHORTNESS OF BREATH: NOT AT ALL
QUESTION_4 LAST FOUR WEEKS HOW OFTEN HAVE YOU USED YOUR RESCUE INHALER OR NEBULIZER MEDICATION (SUCH AS ALBUTEROL): NOT AT ALL
QUESTION_3 LAST FOUR WEEKS HOW OFTEN DID YOUR ASTHMA SYMPTOMS (WHEEZING, COUGHING, SHORTNESS OF BREATH, CHEST TIGHTNESS OR PAIN) WAKE YOU UP AT NIGHT OR EARLIER THAN USUAL IN THE MORNING: NOT AT ALL
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
## 2021-06-07 NOTE — PATIENT INSTRUCTIONS
Patient Education   Liver-Brain Disease (Hepatic Encephalopathy)  Symptoms and Treatment  What is hepatic encephalopathy (HE)?  This condition can happen when you have cirrhosis of the liver. The liver can no longer remove toxins from the blood. The toxins build up and travel through the body and affect the brain.   What are the signs that someone has HE?  Signs and symptoms may be different for each person. Sometimes you don't know you have it, and family or friends notice the signs.   Mental signs     Forgetting things, feeling confused    Poor judgment    Trouble focusing on a task    Not knowing who you are or where you are    Odd behavior  Physical signs     A change in sleep habits    Handwriting gets worse    Shaking of hands or arms. This is more than a simple tremor.    Slurred speech    Slowed movements  What are the stages of HE?  Stage 1: Mild confusion, slow to do tasks, short attention span, changes in sleep habits  Stage 2: Sluggish, confused, strange behavior, slurred speech, changes in personality  Stage 3: Appears to be sleeping, but can be wakened; unaware of time and place; speech cannot be understood  Stage 4: Coma  Treatment  The aim of treatment is to lower the toxins in the blood.   1. Lactulose is used to lower some of the toxins made in the body. It works by flushing toxins out through the bowels and by reducing the amount of toxins made in the bowels.  2. Rifaximin (an antibiotic) aims to restore balance in gut bacteria and reduce toxins.  3. Zinc may help clear toxins from the blood.  4. Finally, a liver transplant may be needed.  Treatment can help control the symptoms, but HE does not get better on its own. The goal of treatment is to improve your well-being and keep you out of the hospital.  What may cause HE to return or get worse?    Not drinking enough fluids (dehydration). Drink plenty of water    Low sodium or potassium levels in the blood (this happens with low  fluids)    Bleeding in the stomach or bowels    Infection    Constipation    Drugs such as opioids (oxydocone, hydrocodone)    Kidney failure  Most patients can control or improve their HE. Be sure you take your medicines, track your symptoms and stay in contact with your liver clinic to discuss changes in how your are feeling.  For informational purposes only. Not to replace the advice of your health care provider.   Copyright   2016 [facility]. All rights reserved. [employer] [id number] - 02/16.       Patient Education     Cirrhosis    The liver is found on the right side of your belly (abdomen). It's just below the rib cage. The liver has many important jobs. It removes toxins from the blood. It also helps your blood clot to stop bleeding. Cirrhosis happens when the liver is scarred or injured. This damage is permanent. It can cause your liver to stop working (liver failure).    The most common causes of cirrhosis are long-term heavy alcohol use and having hepatitis B or C. Other causes include nonalcoholic steatohepatitis (DO or fatty liver disease), autoimmune disease, hemochromatosis, toxins, certain medicines, and certain viruses.   Common symptoms of cirrhosis include:    Tiredness or weakness    Loss of appetite    Nausea and vomiting    Easy bleeding and bruising    Swelling of the belly (abdomen)    Weight loss    Yellowing of the eyes or skin (jaundice)    Itching    Confusion  Treatment helps ease symptoms and prevent more liver damage. You may also get treatment to fight  or cure the hepatitis virus. Quitting alcohol will help slow down the disease getting worse. It may also prevent more complications. If cirrhosis gets worse and becomes life threatening, you may need a liver transplant.    Home care    Don't take medicines that can make liver damage worse. Your healthcare provider will tell you if any of the medicines you now take need to be changed. Talk with your provider or pharmacist  before taking any medicine not prescribed. These include dietary supplements and herbs. Some of these may make liver damage worse.    Talk with your healthcare provider about medicines that have acetaminophen or NSAIDs such as ibuprofen and naproxen. These can also harm your liver.     Stop drinking alcohol. If you find it hard to stop drinking, seek professional help. Consider joining Alcoholics Anonymous or another type of treatment program for support.    If you use IV drugs, you are at high risk for hepatitis B and C. Seek help to stop.     Be sure to ask your healthcare provider about recommended vaccines. These include vaccines for viruses that can cause liver disease.    Follow-up care  Follow up with your healthcare provider, or as advised. If you have cirrhosis, you may need more testing to look for complications, including liver cancer.   For more information and to learn about support groups for people with liver disease, contact:     American Liver Foundation, www.liverfoundation.org, 942.135.1358    Hepatitis [employer] International, , www.hepatitisfoundation.org, 219.408.9786  When to seek medical advice  Call your healthcare provider right away if you have any of the following:    Rapid weight gain with increased size of your belly (abdomen) or leg swelling    Yellow color of your skin or eyes (jaundice) gets worse    Excess bleeding from cuts or injuries  [employer] last reviewed this educational content on 2/1/2020 2000-2021 [employer]. All rights reserved. This information is not intended as a substitute for professional medical care. Always follow your healthcare professional's instructions.           Patient Education     Discharge Instructions for Cirrhosis of the Liver   You have been diagnosed with cirrhosis of the liver. This is a long-term (chronic) problem. It occurs when liver tissue is destroyed and replaced by scar tissue. Causes of cirrhosis include:    Infection such  as viral hepatitis    Chronic alcoholism    The body s immune system attacks healthy cells (autoimmune disorders)    Obesity, diabetes, high blood pressure, and high cholesterol    Medicine side effects    Genetic diseases  Sometimes the exact cause is unknown. You may not have any symptoms at first. Or your symptoms may be mild. But they usually get worse. Cirrhosis is likely to occur if you have a history of long-term alcohol abuse. Cirrhosis can t be cured. But it can be treated and sometimes can improve.  Home care  Alcohol    People with liver disease should not drink alcohol. If you stop drinking, you may feel better and live longer.    If you are a chronic alcohol user, you will have withdrawal symptoms. Talk with your healthcare provider for more information.      If alcohol is a problem, ask your provider about medicine that can help you quit drinking.      Find a local Alcoholics Anonymous support group online.  Diet    Ask your provider what kind of diet you should follow. You may be asked to limit or not eat certain foods. Do not limit your protein intake.    Weigh yourself daily and keep a weight log. If you have a sudden change in weight, call your provider.    Cut back on salt:  ? Limit canned, dried, packaged, and fast foods.  ? Don t add salt to your food at the table.  ? Season foods with herbs instead of salt when you cook.  Medicines, supplements, and vaccines    Take your medicines exactly as directed.    Talk with your provider before taking vitamins, over-the-counter medicines, or herbal supplements. Some herbal supplements may be toxic to the liver. Pain killers called NSAIDs (nonsteroidal anti-inflammatory drugs) such as ibuprofen can harm the liver if you have cirrhosis.    Don't take aspirin or other blood-thinning medicines.    Discuss vitamin supplements and deficiencies with your provider.    Ask your provider about getting vaccines for viruses that can cause liver  diseases.    Follow-up care  Follow up with your healthcare provider, or as advised. You will likely have the following tests:    Lab tests    Blood tests for liver cancer    Ultrasound or MRI of your liver every 6 months    Endoscopy to check for swollen veins (varices) in your digestive tract  When to call your provider  Call your healthcare provider right away if you have any of the following:    Fever of 100.4 F (38.0 C) or higher, or as directed by your provider    Extreme tiredness (fatigue), weakness, or lack of appetite    Vomiting (with or without blood)    Yellowing of your skin or eyes (jaundice)    Itching    Swelling in your belly or legs    Black or tarry stools    Skin that bruises easily    Confusion or trouble thinking clearly  [employer] last reviewed this educational content on 6/1/2019 2000-2021 [employer]. All rights reserved. This information is not intended as a substitute for professional medical care. Always follow your healthcare professional's instructions.           Patient Education     Treating Cirrhosis  Cirrhosis is a condition where the liver is damaged. Scar tissue slowly replaces healthy tissue. This makes the liver stop working properly. Treatment can control or slow liver scarring. Follow your healthcare provider s instructions closely to get the most out of your treatment. And ask your family and friends for support.   Making a treatment plan  You and your healthcare provider will decide on a treatment plan that s best for you. The plan may include one or more of the following:     Not drinking alcohol. Heavy alcohol use can damage the liver. Once the liver is damaged, even a small amount of alcohol can cause problems. You can slow down the cirrhosis getting worse if you stop all alcohol use.     Taking medicines. You may need to take these to treat some causes of cirrhosis. These include infection or a bile duct blockage. For instance, hepatitis C can now be  cured with medicine. You may also need medicine to help your blood clot. And you may need medicine if your immune system is attacking the liver or bile ducts. You should not take certain other medicines if you have cirrhosis. These include NSAIDs such as ibuprofen and naproxen.     Treating symptoms. Cirrhosis can cause swelling in your stomach and legs. A low-salt diet can help ease this symptom. Stay under about 2,000 mg of sodium a day. Taking water pills (diuretics) may also be prescribed to help with swelling.    Eating healthy foods    Losing extra weight. This is especially important if you are overweight, or have diabetes, high blood pressure, or high cholesterol and triglycerides. Controlling these condition can slow down the cirrhosis getting worse. Exercise can help you lose weight.     Removing iron from your blood. Sometimes the amount of iron in your liver is higher than normal. Your healthcare provider may remove extra iron from your blood.  Severe cases of cirrhosis may need special treatments. Your healthcare provider can discuss them with you.   Vaccines  Be sure to ask your healthcare provider about recommended vaccines. These include vaccines for viruses than can cause liver disease.   Don't drink alcohol  Alcohol use can destroy liver cells. If you have problems quitting alcohol, ask your healthcare provider to get the support you need. Your healthcare provider may be able to suggest local groups that can help you stop drinking.   [employer] last reviewed this educational content on 6/1/2019 2000-2021 [employer]. All rights reserved. This information is not intended as a substitute for professional medical care. Always follow your healthcare professional's instructions.

## 2021-06-08 ENCOUNTER — MYC MEDICAL ADVICE (OUTPATIENT)
Dept: FAMILY MEDICINE | Facility: CLINIC | Age: 41
End: 2021-06-08

## 2021-06-08 ENCOUNTER — TELEPHONE (OUTPATIENT)
Dept: FAMILY MEDICINE | Facility: CLINIC | Age: 41
End: 2021-06-08

## 2021-06-08 ASSESSMENT — ASTHMA QUESTIONNAIRES: ACT_TOTALSCORE: 25

## 2021-06-08 NOTE — TELEPHONE ENCOUNTER
lactulose (CEPHULAC) 20 GM packet, is not covered on patient's insurance. They think that the liquid may be, can you send a new RX.Susan Ortega MA/TC

## 2021-06-09 RX ORDER — LACTULOSE 10 G/15ML
20 SOLUTION ORAL 3 TIMES DAILY
Qty: 946 ML | Refills: 1 | Status: SHIPPED | OUTPATIENT
Start: 2021-06-09 | End: 2021-07-05

## 2021-06-10 DIAGNOSIS — Z53.9 DIAGNOSIS NOT YET DEFINED: Primary | ICD-10-CM

## 2021-06-10 PROCEDURE — G0180 MD CERTIFICATION HHA PATIENT: HCPCS | Performed by: INTERNAL MEDICINE

## 2021-06-11 NOTE — TELEPHONE ENCOUNTER
RECORDS RECEIVED FROM: Internal   Appt Date: 06.30.2021    NOTES STATUS DETAILS   OFFICE NOTE from referring provider Internal 06.07.2021 Consult Jarred Cavanaugh MD   OFFICE NOTES from other specialists Internal 03.23.2021 Michael Brennan MD   DISCHARGE SUMMARY from hospital N/A    MEDICATION LIST Internal / CE    LIVER BIOSPY (IF APPLICABLE)      PATHOLOGY REPORTS  N/A    IMAGING     ENDOSCOPY (IF AVAILABLE) Care Everywhere 11.09.2020   COLONOSCOPY (IF AVAILABLE) Received 11.25.2020 11.24.2020   ULTRASOUND LIVER N/A    CT OF ABDOMEN N/A    MRI OF LIVER N/A    FIBROSCAN, US ELASTOGRAPHY, FIBROSIS SCAN, MR ELASTOGRAPHY N/A    LABS     HEPATIC PANEL (LIVER PANEL) Care Everywhere 05.25.2021   BASIC METABOLIC PANEL Care Everywhere 05.25.2021   COMPLETE METABOLIC PANEL Care Everywhere 05.25.2021   COMPLETE BLOOD COUNT (CBC) Care Everywhere 05.25.2021   INTERNATIONAL NORMALIZED RATIO (INR) Internal 06.07.2021    HEPATITIS C ANTIBODY Care Everywhere 08.09.2020   HEPATITIS C VIRAL LOAD/PCR N/A    HEPATITIS C GENOTYPE N/A    HEPATITIS B SURFACE ANTIGEN N/A    HEPATITIS B SURFACE ANTIBODY N/A    HEPATITIS B DNA QUANT LEVEL N/A    HEPATITIS B CORE ANTIBODY N/A

## 2021-06-17 ENCOUNTER — MEDICAL CORRESPONDENCE (OUTPATIENT)
Dept: HEALTH INFORMATION MANAGEMENT | Facility: CLINIC | Age: 41
End: 2021-06-17

## 2021-06-18 ENCOUNTER — TRANSFERRED RECORDS (OUTPATIENT)
Dept: HEALTH INFORMATION MANAGEMENT | Facility: CLINIC | Age: 41
End: 2021-06-18

## 2021-06-18 LAB
CREAT SERPL-MCNC: 0.86 MG/DL (ref 0.57–1.11)
GFR SERPL CREATININE-BSD FRML MDRD: >60 ML/MIN/1.73M2
GLUCOSE SERPL-MCNC: 203 MG/DL (ref 65–100)
POTASSIUM SERPL-SCNC: 5 MMOL/L (ref 3.5–5)

## 2021-06-21 ENCOUNTER — PATIENT OUTREACH (OUTPATIENT)
Dept: CARE COORDINATION | Facility: CLINIC | Age: 41
End: 2021-06-21

## 2021-06-21 DIAGNOSIS — Z71.89 COUNSELING AND COORDINATION OF CARE: Primary | ICD-10-CM

## 2021-06-21 NOTE — LETTER
[facility] CARE COORDINATION  [address] [address] [facility] [address]    June 22, 2021    Zulay Rossi  [address]  [facility] [address]      Dear Zulay,    I am a clinic community health worker who works with Dania Munoz PA-C at [facility]. I have been trying to reach you recently to introduce Clinic Care Coordination and to see if there was anything I could assist you with.  Below is a description of clinic care coordination and how I can further assist you.      The clinic care coordination team is made up of a registered nurse,  and community health worker who understand the health care system. The goal of clinic care coordination is to help you manage your health and improve access to the health care system in the most efficient manner. The team can assist you in meeting your health care goals by providing education, coordinating services, strengthening the communication among your providers and supporting you with any resource needs.    Please feel free to contact me at [phone] with any questions or concerns. We are focused on providing you with the highest-quality healthcare experience possible and that all starts with you.     Sincerely,   Sandy MEREDITH, Community Health Worker  Clinic Care Coordination  [facility] : [place], [place] & [place]  Phone: [phone]

## 2021-06-21 NOTE — PROGRESS NOTES
Clinic Care Coordination Contact  [facility]/Voicemail       Clinical Data: CHW Outreach  Outreach attempted x 1. Left message on patient's voicemail with call back information and requested return call.    Plan: CHW will try to reach patient again in 1-2 business days.    Sandy MEREDITH Community Health Worker  Clinic Care Coordination  [facility] : [place], [place] & [place]  Phone: [phone]    Reason for Referral: Care Transition: ED to outpatient    Notes:  Seen in the ER on 06/18/21  Any questions for CC RN   Schedule F/U appointment with PCP in 1 week.

## 2021-06-22 NOTE — PROGRESS NOTES
Clinic Care Coordination Contact  [facility]/Voicemail       Clinical Data: CHW Outreach  Outreach attempted x 2. Left message on patient's voicemail with call back information and requested return call.    Plan: CHW will send care coordination introduction letter with care coordinator contact information and explanation of care coordination services via [employer].     CHW will do no further outreaches at this time.    Sandy MEREDITH Community Health Worker  Clinic Care Coordination  [facility] : [place], [place] & [place]  Phone: [phone]    Reason for Referral: Care Transition: ED to outpatient

## 2021-06-25 NOTE — PROGRESS NOTES
Assessment & Plan     Alcoholic cirrhosis of liver without ascites (H)  Still having mild icterus  Appears much better  Continue Lasix and spironolactone  Going to see gastroenterologist on Wednesday  She will also need some help with the addiction  Currently clean of alcohol  - Hepatic panel (Albumin, ALT, AST, Bili, Alk Phos, TP)  - MENTAL HEALTH REFERRAL  - Adult; Addiction Medicine Provider; Addiction Medicine Evaluation & Treatment; Addiction Medicine Consultation, Evaluation & Treatment [phone]; Alcohol; Medication Assisted Treatment: Alcohol; We will contact you t...      25 minutes spent on the date of the encounter doing chart review, history and exam, documentation and further activities per the note           Return in about 3 months (around 9/28/2021).    Jarred Cavanaugh MD  [facility] RENETTA Biswas is a 41 year old who presents for the following health issues     History of Present Illness       She eats 2-3 servings of fruits and vegetables daily.She consumes 2 sweetened beverage(s) daily.She exercises with enough effort to increase her heart rate 20 to 29 minutes per day.  She exercises with enough effort to increase her heart rate 6 days per week.   She is taking medications regularly.         -pt states that she is here today to follow up on labs from 6/7/21          Review of Systems   Constitutional, HEENT, cardiovascular, pulmonary, gi and gu systems are negative, except as otherwise noted.      Objective    BP (!) 144/70 (BP Location: Right arm, Patient Position: Sitting, Cuff Size: Adult Regular)   Pulse 103   Temp 97.8  F (36.6  C) (Tympanic)   Resp 14   Wt 47.2 kg (104 lb)   SpO2 100%   BMI 19.02 kg/m    Body mass index is 19.02 kg/m .  Physical Exam   GENERAL: healthy, alert and no distress  EYES: Mild icterus  NECK: no adenopathy, no asymmetry, masses, or scars and thyroid normal to palpation  RESP: lungs clear to auscultation - no rales,  rhonchi or wheezes  CV: regular rate and rhythm, normal S1 S2, no S3 or S4, no murmur, click or rub, no peripheral edema and peripheral pulses strong  ABDOMEN: soft, nontender, no hepatosplenomegaly, no masses and bowel sounds normal  MS: no gross musculoskeletal defects noted, no edema

## 2021-06-28 ENCOUNTER — OFFICE VISIT (OUTPATIENT)
Dept: FAMILY MEDICINE | Facility: CLINIC | Age: 41
End: 2021-06-28
Payer: COMMERCIAL

## 2021-06-28 VITALS
DIASTOLIC BLOOD PRESSURE: 70 MMHG | SYSTOLIC BLOOD PRESSURE: 144 MMHG | HEART RATE: 103 BPM | WEIGHT: 104 LBS | BODY MASS INDEX: 19.02 KG/M2 | TEMPERATURE: 97.8 F | OXYGEN SATURATION: 100 % | RESPIRATION RATE: 14 BRPM

## 2021-06-28 DIAGNOSIS — K70.30 ALCOHOLIC CIRRHOSIS OF LIVER WITHOUT ASCITES (H): Primary | ICD-10-CM

## 2021-06-28 LAB
ALBUMIN SERPL-MCNC: 2.8 G/DL (ref 3.4–5)
ALP SERPL-CCNC: 96 U/L (ref 40–150)
ALT SERPL W P-5'-P-CCNC: 55 U/L (ref 0–50)
AST SERPL W P-5'-P-CCNC: 124 U/L (ref 0–45)
BILIRUB DIRECT SERPL-MCNC: 0.7 MG/DL (ref 0–0.2)
BILIRUB SERPL-MCNC: 1.2 MG/DL (ref 0.2–1.3)
PROT SERPL-MCNC: 7.4 G/DL (ref 6.8–8.8)

## 2021-06-28 PROCEDURE — 80076 HEPATIC FUNCTION PANEL: CPT | Performed by: INTERNAL MEDICINE

## 2021-06-28 PROCEDURE — 99214 OFFICE O/P EST MOD 30 MIN: CPT | Performed by: INTERNAL MEDICINE

## 2021-06-28 PROCEDURE — 36415 COLL VENOUS BLD VENIPUNCTURE: CPT | Performed by: INTERNAL MEDICINE

## 2021-06-28 NOTE — PATIENT INSTRUCTIONS
Patient Education     The Impact of Alcohol Use Disorder   Impact on the person, workplace, family, and society  People with alcohol use disorder must cope with problems caused by their drinking. These include problems with health, family, and work. Drinking can lead to fear and guilt, anger and insecurity. People with alcoholism are often in denial about all of this. This means they do not admit they have a problem with alcohol.  People with alcoholism have more accidents while at work. Their job performance may go down. And they are often absent or late. All this costs the company in lost time and medical care. It may also cost the company in property damage. Coworkers may need to fill in or cover up for a person with alcoholism. This can lead to anger.  Alcoholism also affects the whole family. Family members may feel alone, neglected or abused. They may become angry or distrustful. They may also feel guilt or fear. These feelings can lead to a lot of stress and upset in a family.  People with alcoholism are more often involved in traffic accidents. Other types of accidents and fires are also common. They more often abuse their children and spouses. And they are more likely to hurt or kill someone else or themselves. The human cost of these problems is huge. So is the financial cost. Alcoholism costs the [place] billions of dollars every year. These costs are in the form of medical expenses and high insurance premiums. They are also in the form of property damage.  What you can do  If you are a coworker, family member, or friend, you can help. Start by learning more about alcoholism and its effects. Learn the warning signs. And reach out for support. If a person with alcoholism will not get help, you can still join a support group and gather resources. One of the best things you can do is to not be an enabler. That is, you cannot make excuses for people who drink. This can help force them to take responsibility  for their own actions.    Alcoholism affects not just the person who drinks. It also affects family and friendships. It affects the workplace. And it affects the community.  [employer] last reviewed this educational content on 5/1/2019 2000-2021 [employer]. All rights reserved. This information is not intended as a substitute for professional medical care. Always follow your healthcare professional's instructions.           Patient Education     Understanding Alcohol Use Disorder (AUD)   Alcohol use disorder (AUD) is a disease in which a person is dependent on a drug--alcohol. It's also called alcoholism. The disease can harm the person s physical and mental health. It can also affect his or her behavior. AUD is not a character flaw. It's not a moral failure. It's a disease that gets worse over time. Untreated, it can lead to brain damage and death. Recovery is possible if drinking is stopped.   Effects of AUD   Behavioral effects  Drinking is the main behavior of people with alcohol use disorder. They develop a private relationship with drinking. They guard it. They give it their time, money, and attention. This may happen at the expense of family and friends. They lie for it and think about it all the time. They may risk losing their families for it. They may risk their lives for it. Despite the harm it causes, they can t control the drinking.   Health risks  People with alcohol use disorder are at high risk for health problems. These include heart disease and cancer. They also include mental illness. People with the disease may not heal from illness normally. Unless drinking is stopped, it can cause death. Death may result from organ failure, cancer, or common viruses. Death may also result from accidents or suicide.   Physical effects  Alcohol can be like a poison to the body. It kills cells. Heavy drinking over a long time can greatly harm the body s organs. These can include the brain, heart,  "liver, and pancreas. Chronic drinking also harms the digestive tract. It can make the blood \"thin\" and unable to clot. This causes bruising and even fatal bleeding. It harms the immune system as well. This leaves the body at risk for serious disease.   Psychological effects  Alcohol use disorder can lead to a type of distorted thinking known as  alcohol-think.  One of the most common forms of this is denial. This is when the person denies that drinking is a problem. He or she may deny that any of the problems in his or her life are caused by drinking.   [employer] last reviewed this educational content on 9/1/2018 2000-2021 [employer]. All rights reserved. This information is not intended as a substitute for professional medical care. Always follow your healthcare professional's instructions.           "

## 2021-06-28 NOTE — LETTER
[address] [address]  Phone: [phone]    June 28, 2021        Zulay Rossi  [address] [place] [facility] [address]          To whom it may concern:    RE: Zulay Rossi    Patient was seen and treated today at our clinic.  Patient may return to work on 07/06/2021 for 4 hrs a day five days a week with no restrictions.    Please contact me for questions or concerns.      Sincerely,        Jarred Cavanaugh MD

## 2021-06-30 ENCOUNTER — VIRTUAL VISIT (OUTPATIENT)
Dept: GASTROENTEROLOGY | Facility: CLINIC | Age: 41
End: 2021-06-30
Attending: INTERNAL MEDICINE
Payer: COMMERCIAL

## 2021-06-30 ENCOUNTER — PRE VISIT (OUTPATIENT)
Dept: GASTROENTEROLOGY | Facility: CLINIC | Age: 41
End: 2021-06-30

## 2021-06-30 DIAGNOSIS — K70.31 ALCOHOLIC CIRRHOSIS OF LIVER WITH ASCITES (H): ICD-10-CM

## 2021-06-30 DIAGNOSIS — F10.21 ALCOHOL USE DISORDER, SEVERE, IN EARLY REMISSION (H): Primary | ICD-10-CM

## 2021-06-30 DIAGNOSIS — K86.1 CHRONIC PANCREATITIS, UNSPECIFIED PANCREATITIS TYPE (H): ICD-10-CM

## 2021-06-30 PROCEDURE — 99205 OFFICE O/P NEW HI 60 MIN: CPT | Mod: 95 | Performed by: STUDENT IN AN ORGANIZED HEALTH CARE EDUCATION/TRAINING PROGRAM

## 2021-06-30 ASSESSMENT — PAIN SCALES - GENERAL: PAINLEVEL: NO PAIN (0)
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
## 2021-07-02 DIAGNOSIS — E11.65 TYPE 2 DIABETES MELLITUS WITH HYPERGLYCEMIA, WITH LONG-TERM CURRENT USE OF INSULIN (H): ICD-10-CM

## 2021-07-02 DIAGNOSIS — Z79.4 TYPE 2 DIABETES MELLITUS WITH HYPERGLYCEMIA, WITH LONG-TERM CURRENT USE OF INSULIN (H): ICD-10-CM

## 2021-07-02 DIAGNOSIS — K70.31 ALCOHOLIC CIRRHOSIS OF LIVER WITH ASCITES (H): ICD-10-CM

## 2021-07-02 NOTE — TELEPHONE ENCOUNTER
Routing refill request to provider for review/approval because:    Last visit was in 9/2020    Agustin BARNETT RN....7/2/2021 3:45 PM

## 2021-07-05 DIAGNOSIS — E11.65 TYPE 2 DIABETES MELLITUS WITH HYPERGLYCEMIA, WITH LONG-TERM CURRENT USE OF INSULIN (H): ICD-10-CM

## 2021-07-05 DIAGNOSIS — Z79.4 TYPE 2 DIABETES MELLITUS WITH HYPERGLYCEMIA, WITH LONG-TERM CURRENT USE OF INSULIN (H): ICD-10-CM

## 2021-07-05 RX ORDER — LACTULOSE 10 G/15ML
20 SOLUTION ORAL 3 TIMES DAILY
Qty: 946 ML | Refills: 1 | Status: SHIPPED | OUTPATIENT
Start: 2021-07-05 | End: 2022-01-03

## 2021-07-05 RX ORDER — INSULIN ASPART 100 [IU]/ML
1-4 INJECTION, SOLUTION INTRAVENOUS; SUBCUTANEOUS
Qty: 15 ML | Refills: 1 | Status: SHIPPED | OUTPATIENT
Start: 2021-07-05 | End: 2021-11-19

## 2021-07-05 RX ORDER — PEN NEEDLE, DIABETIC 32GX 5/32"
NEEDLE, DISPOSABLE MISCELLANEOUS
Qty: 100 EACH | Refills: 3 | Status: SHIPPED | OUTPATIENT
Start: 2021-07-05

## 2021-07-05 NOTE — TELEPHONE ENCOUNTER
Routing refill request to provider for review/approval because:  Overdue for lab and f/u visit.    Endocrine MA, please call patient to help schedule visits.    Agustin BARNETT RN....7/5/2021 1:37 PM

## 2021-07-05 NOTE — TELEPHONE ENCOUNTER
Called patient and left VM, informed that she is overdue for her labs and follow up. Requested call back to make an appt. Scheduling number provided.     Mikaela MABRY MA

## 2021-07-08 ENCOUNTER — MEDICAL CORRESPONDENCE (OUTPATIENT)
Dept: HEALTH INFORMATION MANAGEMENT | Facility: CLINIC | Age: 41
End: 2021-07-08

## 2021-07-08 ENCOUNTER — TELEPHONE (OUTPATIENT)
Dept: FAMILY MEDICINE | Facility: CLINIC | Age: 41
End: 2021-07-08

## 2021-07-08 NOTE — TELEPHONE ENCOUNTER
I have received multiple forms for this patient from the same company which I have signed and placed on TC basket aT BK

## 2021-07-08 NOTE — TELEPHONE ENCOUNTER
Form received from [employer] [facility].  Placed on Dr. Cavanaugh's desk for signature.

## 2021-07-08 NOTE — TELEPHONE ENCOUNTER
Form received from [employer] [facility].  Placed on Dr. Cavanaugh's desk for signature.

## 2021-07-09 NOTE — TELEPHONE ENCOUNTER
There were orders for this pt. Orders were signed and faxed over to [facility] on 7/8/21

## 2021-07-12 ENCOUNTER — TELEPHONE (OUTPATIENT)
Dept: GASTROENTEROLOGY | Facility: CLINIC | Age: 41
End: 2021-07-12

## 2021-07-12 ENCOUNTER — MEDICAL CORRESPONDENCE (OUTPATIENT)
Dept: HEALTH INFORMATION MANAGEMENT | Facility: CLINIC | Age: 41
End: 2021-07-12

## 2021-07-14 ENCOUNTER — VIRTUAL VISIT (OUTPATIENT)
Dept: ONCOLOGY | Facility: CLINIC | Age: 41
End: 2021-07-14
Payer: COMMERCIAL

## 2021-07-14 DIAGNOSIS — Z12.31 ENCOUNTER FOR SCREENING MAMMOGRAM FOR BREAST CANCER: Primary | ICD-10-CM

## 2021-07-14 PROCEDURE — 99214 OFFICE O/P EST MOD 30 MIN: CPT | Mod: 95 | Performed by: INTERNAL MEDICINE
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
## 2021-07-14 NOTE — PATIENT INSTRUCTIONS
Schedule mammogram in the next few days    Assuming that it is unremarkable, we will plan to repeat mammogram in 1 year and see me after that    In the meantime follow with PCP/Liver doctor and serial monitoring of your blood counts.

## 2021-07-14 NOTE — PROGRESS NOTES
Per pt: end of April/28th -coma d/t liver failure; stopped Tamoxifen at that time.    Off all meds except the following at this time: Currently still taking: Zoloft, Reglan, Zofran, and then added: Lasix 20 mg every day. Also added: Spironolactone 50 mg and also Lactulose TID.        Zulay is a 41 year old who is being evaluated via a billable video visit.      How would you like to obtain your AVS? MyChart  If the video visit is dropped, the invitation should be resent by: Text to cell phone: [phone]   Will anyone else be joining your video visit? No
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
## 2021-07-20 DIAGNOSIS — G89.4 CHRONIC PAIN SYNDROME: ICD-10-CM

## 2021-07-20 DIAGNOSIS — K86.1 CHRONIC PANCREATITIS, UNSPECIFIED PANCREATITIS TYPE (H): ICD-10-CM

## 2021-07-21 RX ORDER — TIZANIDINE 2 MG/1
2 TABLET ORAL 3 TIMES DAILY PRN
Qty: 60 TABLET | Refills: 1 | OUTPATIENT
Start: 2021-07-21

## 2021-07-26 ENCOUNTER — MYC MEDICAL ADVICE (OUTPATIENT)
Dept: FAMILY MEDICINE | Facility: CLINIC | Age: 41
End: 2021-07-26

## 2021-07-26 NOTE — LETTER
[address] [address]  Phone: [phone]    July 26, 2021        Zulay Rossi  [address] [place] [address]          To whom it may concern:    RE: Zulay Rossi    Patient may return to work  8-4:30 with a day off every other week and time off for regular doctor's appointments when needed.  These can come into effect immediately.        Please contact me for questions or concerns.      Sincerely,        Jarred Cavanaugh MD

## 2021-08-02 ENCOUNTER — OFFICE VISIT (OUTPATIENT)
Dept: SURGERY | Facility: CLINIC | Age: 41
End: 2021-08-02
Payer: COMMERCIAL

## 2021-08-02 DIAGNOSIS — D05.11 DUCTAL CARCINOMA IN SITU (DCIS) OF RIGHT BREAST: Primary | ICD-10-CM

## 2021-08-02 PROCEDURE — 99212 OFFICE O/P EST SF 10 MIN: CPT | Performed by: SURGERY

## 2021-08-02 ASSESSMENT — PAIN SCALES - GENERAL: PAINLEVEL: MODERATE PAIN (4)

## 2021-08-02 NOTE — LETTER
8/2/2021         RE: Zulay Rossi  [address] [place] [address]        Dear Colleague,    Thank you for referring your patient, Zulay Rossi, to the [facility]. Please see a copy of my visit note below.    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    Treatment to date:  1. Right wire-localized lumpectomy (10/22/2020) for atypia - final pathology showed 1.2 cm of DCIS, ER+ grade 1 with uninvolved margins  2. Adjuvant radiation therapy (11/17/2020 to 12/16/2020)  3. Adjuvant tamoxifen (12/2020 to 4/2021)      HPI:    Since her last visit, she had liver failure and was hospitalized.  Tamoxifen was stopped as a result.  She notes no masses in either breast, axilla, or neck. She denies any nipple discharge or nipple inversion.  She does have some contour changes in the right breast.    Physical Exam  Constitutional:       Appearance: She is well-developed.   Chest:      Breasts: Breasts are symmetrical.         Right: Skin change (Hyperpigmentation and breast contour change secondary to recent radiation therapy) present. No inverted nipple, mass, nipple discharge or tenderness.         Left: No inverted nipple, mass, nipple discharge, skin change or tenderness.      Comments: Patient was examined in both supine and upright positions.   Lymphadenopathy:      Cervical: No cervical adenopathy.      Right cervical: No superficial, deep or posterior cervical adenopathy.     Left cervical: No superficial, deep or posterior cervical adenopathy.      Upper Body:      Right upper body: No supraclavicular, axillary or pectoral adenopathy.      Left upper body: No supraclavicular, axillary or pectoral adenopathy.      Comments: No lymphedema in bilateral upper extremities.   Skin:     General: Skin is warm and dry.          INVESTIGATIONS:  None    ASSESSMENT:    Zulay Rossi is a 41 year old female with right breast  DCIS, nearly 1 year s/p surgery.    There is no evidence of recurrence currently. Her mammogram is scheduled for 8/24/2021. Given the liver failure, we discussed it is very reasonable to forgo tamoxifen.  We discussed ongoing annual mammogram. She will follow up with Dr Brennan. I will see her on an as needed basis.    Total time spent with the patient was 15 minutes, of which 75% was counseling.     PLAN:  1. Next mammogram scheduled for 8/24/2021  2. Follow up with Dr Mika Cohen MD MSc [facility] FACS  Assistant Professor of Surgery  Division of Surgical Oncology  [facility]     17 minutes spent on the date of the encounter doing chart review, patient visit and documentation.        Again, thank you for allowing me to participate in the care of your patient.        Sincerely,        Sarah Cohen MD

## 2021-08-02 NOTE — PROGRESS NOTES
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    Treatment to date:  1. Right wire-localized lumpectomy (10/22/2020) for atypia - final pathology showed 1.2 cm of DCIS, ER+ grade 1 with uninvolved margins  2. Adjuvant radiation therapy (11/17/2020 to 12/16/2020)  3. Adjuvant tamoxifen (12/2020 to 4/2021)      HPI:    Since her last visit, she had liver failure and was hospitalized.  Tamoxifen was stopped as a result.  She notes no masses in either breast, axilla, or neck. She denies any nipple discharge or nipple inversion.  She does have some contour changes in the right breast.    Physical Exam  Constitutional:       Appearance: She is well-developed.   Chest:      Breasts: Breasts are symmetrical.         Right: Skin change (Hyperpigmentation and breast contour change secondary to recent radiation therapy) present. No inverted nipple, mass, nipple discharge or tenderness.         Left: No inverted nipple, mass, nipple discharge, skin change or tenderness.      Comments: Patient was examined in both supine and upright positions.   Lymphadenopathy:      Cervical: No cervical adenopathy.      Right cervical: No superficial, deep or posterior cervical adenopathy.     Left cervical: No superficial, deep or posterior cervical adenopathy.      Upper Body:      Right upper body: No supraclavicular, axillary or pectoral adenopathy.      Left upper body: No supraclavicular, axillary or pectoral adenopathy.      Comments: No lymphedema in bilateral upper extremities.   Skin:     General: Skin is warm and dry.          INVESTIGATIONS:  None    ASSESSMENT:    Zulay Rossi is a 41 year old female with right breast DCIS, nearly 1 year s/p surgery.    There is no evidence of recurrence currently. Her mammogram is scheduled for 8/24/2021. Given the liver failure, we discussed it is very reasonable to forgo tamoxifen.  We discussed ongoing annual mammogram.  She will follow up with Dr Brennan. I will see her on an as needed basis.    Total time spent with the patient was 15 minutes, of which 75% was counseling.     PLAN:  1. Next mammogram scheduled for 8/24/2021  2. Follow up with Dr Mika Cohen MD MSc [facility] FACS  Assistant Professor of Surgery  Division of Surgical Oncology  [facility]     17 minutes spent on the date of the encounter doing chart review, patient visit and documentation.

## 2021-08-02 NOTE — NURSING NOTE
Zulay Rossi's goals for this visit include:   Chief Complaint   Patient presents with     RECHECK     6 month f/u       She requests these members of her care team be copied on today's visit information:     PCP: Jarred Cavanaugh    Referring Provider:  No referring provider defined for this encounter.    There were no vitals taken for this visit.    Do you need any medication refills at today's visit? Nieves Frazier on 8/2/2021 at 2:29 PM

## 2021-08-08 ENCOUNTER — MYC MEDICAL ADVICE (OUTPATIENT)
Dept: FAMILY MEDICINE | Facility: CLINIC | Age: 41
End: 2021-08-08

## 2021-08-10 ENCOUNTER — VIRTUAL VISIT (OUTPATIENT)
Dept: FAMILY MEDICINE | Facility: CLINIC | Age: 41
End: 2021-08-10
Payer: COMMERCIAL

## 2021-08-10 DIAGNOSIS — K86.1 CHRONIC PANCREATITIS, UNSPECIFIED PANCREATITIS TYPE (H): ICD-10-CM

## 2021-08-10 DIAGNOSIS — R11.0 NAUSEA: ICD-10-CM

## 2021-08-10 DIAGNOSIS — K70.31 ALCOHOLIC CIRRHOSIS OF LIVER WITH ASCITES (H): ICD-10-CM

## 2021-08-10 DIAGNOSIS — E10.9 TYPE 1 DIABETES MELLITUS WITHOUT COMPLICATION (H): ICD-10-CM

## 2021-08-10 DIAGNOSIS — Z79.4 TYPE 2 DIABETES MELLITUS WITH HYPERGLYCEMIA, WITH LONG-TERM CURRENT USE OF INSULIN (H): ICD-10-CM

## 2021-08-10 DIAGNOSIS — D63.8 ANEMIA IN OTHER CHRONIC DISEASES CLASSIFIED ELSEWHERE: ICD-10-CM

## 2021-08-10 DIAGNOSIS — E11.65 TYPE 2 DIABETES MELLITUS WITH HYPERGLYCEMIA, WITH LONG-TERM CURRENT USE OF INSULIN (H): ICD-10-CM

## 2021-08-10 DIAGNOSIS — R10.84 ABDOMINAL PAIN, GENERALIZED: Primary | ICD-10-CM

## 2021-08-10 PROBLEM — Z86.0101 H/O ADENOMATOUS POLYP OF COLON: Status: ACTIVE | Noted: 2020-11-10

## 2021-08-10 PROCEDURE — 99214 OFFICE O/P EST MOD 30 MIN: CPT | Performed by: INTERNAL MEDICINE

## 2021-08-10 RX ORDER — OXYCODONE HYDROCHLORIDE 5 MG/1
5 TABLET ORAL EVERY 6 HOURS PRN
Qty: 12 TABLET | Refills: 0 | Status: SHIPPED | OUTPATIENT
Start: 2021-08-10 | End: 2021-08-13

## 2021-08-10 RX ORDER — INSULIN GLARGINE 100 [IU]/ML
INJECTION, SOLUTION SUBCUTANEOUS
Qty: 15 ML | Refills: 1 | Status: SHIPPED | OUTPATIENT
Start: 2021-08-10 | End: 2022-01-07

## 2021-08-10 NOTE — PROGRESS NOTES
Zulay is a 41 year old who is being evaluated via a billable telephone visit.      What phone number would you like to be contacted at? Mobile  How would you like to obtain your [facility]? [facility]    Assessment & Plan     1.  Abdominal pain with nausea but vomiting has subsided.  Probably still related to #2.  Clinically improving.  We will refill oxycodone 5 mg a total of 12 tablets.  Discussed temporary use of this medication in view of history of alcoholism.  2.  Chronic pancreatitis related to alcohol.  Recent ER visit reviewed we will repeat lipase.  3.  Alcohol cirrhosis of liver.  Transaminases still persistently elevated will be rechecking it with CMP today.  4.  Anemia of chronic disease.  She did have anemia related to Olive-Orta tear November 2020.  Her hemoglobin has remained low in the 9 g/dL with normal iron studies.  We will recheck CBC.  5.  Type 1 diabetes partly related to chronic pancreatitis.  No hypoglycemic events.  Will check A1c.    I will check CBC, CMP, lipase and A1c.  I will get back to the results.  Follow-up as needed and follow-up with primary provider as recommended.      No follow-ups on file.    Naseem Dickerson MD  [facility]   Zulay is a 41 year old who presents for the following health issues     HPI     41-year-old young lady with history of alcohol liver disease chronic pancreatitis, diabetes type 1 was recently seen in the emergency room with pain nausea and vomiting.  She was felt to have MARTIN and received IV fluids and hydration.  Her symptoms improved and she was discharged.  She was noted to be anemic in the ER and after hydration her hemoglobin dropped further to around 7.6 g/dL.  She had a Olive-Orta tear requiring blood transfusion in November 2010.  Subsequently she had remained anemic with  normal iron stores and saturation.  She also has had issue of thrombocytopenia related to cirrhosis.  Patient indicates she has remained  abstinence of alcohol use for the past 7 months or more.    He has not noticed a recent gastrointestinal bleeding or hematemesis.  She still has some abdominal pain and nausea but improving.  Requesting a refill of oxycodone that was prescribed in the ER.      Review of Systems   Constitutional, HEENT, cardiovascular, pulmonary, GI, , musculoskeletal, neuro, skin, endocrine and psych systems are negative, except as otherwise noted.      Objective           Vitals:  No vitals were obtained today due to virtual visit.    Physical Exam   healthy, alert and no distress  PSYCH: Alert and oriented times 3; coherent speech, normal   rate and volume, able to articulate logical thoughts, able   to abstract reason, no tangential thoughts, no hallucinations   or delusions  Her affect is normal  RESP: No cough, no audible wheezing, able to talk in full sentences  Remainder of exam unable to be completed due to telephone visits    Reviewed all labs done in ER.            Phone call duration: 15 minutes

## 2021-08-10 NOTE — TELEPHONE ENCOUNTER
"Routing refill request to provider for review/approval because:      Requested Prescriptions   Pending Prescriptions Disp Refills     LANTUS SOLOSTAR 100 UNIT/ML soln [Pharmacy Med Name: LANTUS SOLOSTAR 100 UNIT/ML]  1     Sig: INJECT 8 UNITS SUBCUTANEOUSLY AT BEDTIME       Long Acting Insulin Protocol Failed - 8/10/2021 11:15 AM        Failed - HgbA1C in past 3 or 6 months     If HgbA1C is 8 or greater, it needs to be on file within the past 3 months.  If less than 8, must be on file within the past 6 months.     Recent Labs   Lab Test 11/08/20  0000   A1C 5.3             Failed - Recent (6 mo) or future (30 days) visit within the authorizing provider's specialty     Patient had office visit in the last 6 months or has a visit in the next 30 days with authorizing provider or within the authorizing provider's specialty.  See \"Patient Info\" tab in inbasket, or \"Choose Columns\" in Meds & Orders section of the refill encounter.            Passed - Serum creatinine on file in past 12 months     Recent Labs   Lab Test 06/18/21  0000 07/07/20  1022   CR 0.86  --    CREAT  --  0.9       Ok to refill medication if creatinine is low          Passed - Medication is active on med list        Passed - Patient is age 18 or older           Sent patient a IBU[employer] message to schedule follow up visit and lab apt.    Agustin BARNETT RN....8/10/2021 3:07 PM       "

## 2021-08-24 ENCOUNTER — ANCILLARY PROCEDURE (OUTPATIENT)
Dept: MAMMOGRAPHY | Facility: CLINIC | Age: 41
End: 2021-08-24
Attending: INTERNAL MEDICINE
Payer: COMMERCIAL

## 2021-08-24 DIAGNOSIS — Z12.31 ENCOUNTER FOR SCREENING MAMMOGRAM FOR BREAST CANCER: ICD-10-CM

## 2021-08-24 PROCEDURE — 77067 SCR MAMMO BI INCL CAD: CPT | Performed by: RADIOLOGY

## 2021-08-24 PROCEDURE — 77063 BREAST TOMOSYNTHESIS BI: CPT | Performed by: RADIOLOGY

## 2021-08-25 ENCOUNTER — VIRTUAL VISIT (OUTPATIENT)
Dept: GASTROENTEROLOGY | Facility: CLINIC | Age: 41
End: 2021-08-25
Attending: STUDENT IN AN ORGANIZED HEALTH CARE EDUCATION/TRAINING PROGRAM
Payer: COMMERCIAL

## 2021-08-25 ENCOUNTER — MYC MEDICAL ADVICE (OUTPATIENT)
Dept: GASTROENTEROLOGY | Facility: CLINIC | Age: 41
End: 2021-08-25

## 2021-08-25 DIAGNOSIS — K70.31 ALCOHOLIC CIRRHOSIS OF LIVER WITH ASCITES (H): Primary | ICD-10-CM

## 2021-08-25 DIAGNOSIS — M1A.9XX0 CHRONIC GOUT WITHOUT TOPHUS, UNSPECIFIED CAUSE, UNSPECIFIED SITE: ICD-10-CM

## 2021-08-25 DIAGNOSIS — D64.9 ANEMIA, UNSPECIFIED TYPE: ICD-10-CM

## 2021-08-25 DIAGNOSIS — K85.90 ACUTE PANCREATITIS, UNSPECIFIED COMPLICATION STATUS, UNSPECIFIED PANCREATITIS TYPE: ICD-10-CM

## 2021-08-25 PROCEDURE — 99215 OFFICE O/P EST HI 40 MIN: CPT | Mod: 95 | Performed by: STUDENT IN AN ORGANIZED HEALTH CARE EDUCATION/TRAINING PROGRAM

## 2021-08-25 RX ORDER — FUROSEMIDE 20 MG
20 TABLET ORAL DAILY
Qty: 90 TABLET | Refills: 3 | Status: SHIPPED | OUTPATIENT
Start: 2021-08-25 | End: 2022-01-05

## 2021-08-25 RX ORDER — SPIRONOLACTONE 50 MG/1
50 TABLET, FILM COATED ORAL DAILY
Qty: 90 TABLET | Refills: 3 | Status: SHIPPED | OUTPATIENT
Start: 2021-08-25 | End: 2022-01-05

## 2021-08-25 ASSESSMENT — PAIN SCALES - GENERAL: PAINLEVEL: SEVERE PAIN (6)
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
## 2021-08-26 ENCOUNTER — TELEPHONE (OUTPATIENT)
Dept: GASTROENTEROLOGY | Facility: CLINIC | Age: 41
End: 2021-08-26
Payer: COMMERCIAL

## 2021-08-27 NOTE — TELEPHONE ENCOUNTER
Advanced Endoscopy     Referring provider: Litzy Wasserman MD    Referred to: [facility]     Provider Requested: none specified     Referral Received: 8/25/21     Records received:    8/28/21 labs Glucose 539, K 2.5, Cr 2.12, total bili 1.5. , HgbA1C 7.8    8/7/21 CT Abd  IMPRESSION:   1.  Collateralized abdominal vasculature consistent with no intrinsic liver disease. Low-attenuation of the liver consistent with fatty infiltration.   2.  Gallstones versus sludge.   3.  Focal atelectasis versus pneumonitis anterolateral right middle lobe, new compared with prior exam.   4.  Interval decrease in size of pseudocyst along the body of the pancreas. Similar low-attenuation lesion at the pancreatic neck.   5.  Strandy changes surrounding pancreatic parenchyma could represent acute pancreatitis in the appropriate clinical setting versus chronic inflammatory change.   6.  Atherosclerosis.    CT abdomen and pelvis 5/25/21, 5/22/21    Upper GI endo 11/9/20  Findings:        A 5 mm non-bleeding Olive-Orta tear with stigmata of recent bleeding        was found.        The exam of the esophagus was otherwise normal.        The entire examined stomach was normal.        The examined duodenum was normal.    Abd US 11/8/20   IMPRESSION:   Chronic complex cyst at the tail of the pancreas which may be very slightly increased from 2019 although there are limitations comparing different modalities.   Portal hypertension. Hepatic steatosis     EUS for recurrent acute pancreatitis 2017     Impressions/Post-Op Diagnosis:       - No gross lesions in the stomach.       - Normal ampulla, first portion of the duodenum and second portion of        the duodenum.       - There was no sign of significant pathology in the ampulla.       - There was no sign of significant pathology in the gallbladder.       - There was no sign of significant pathology in the common bile duct.       - Pancreatic parenchymal  "abnormalities consisting of hyperechoic strands        and lobularity were noted in the pancreatic head.       - Pancreatic parenchymal abnormalities consisting of diffuse        echogenicity and hyperechoic strands were noted in the pancreatic body        and in the pancreatic tail.       - There was no sign of significant pathology in the main pancreatic duct.       - No specimens collected.     Images received: PACs    Evaluation for: follow up of her chronic pancreatitis and resolving pseuocyst     Clinical History (per RN review):   Dr Wasserman's visit on 8/25/21  41 year old woman with a history of alcohol use disorder, suspected cirrhosis, chronic pancreatitis, who presents for follow up of her alcohol related liver disease  Was told she had fatty liver for many years, states she was just recently told it was related to alcohol. She had an admission 8/2020 for a seizure related to alcohol withdrawal, when asked about that she states \"that's what they they thought happened\". She had had prior diagnoses of alcohol related liver disease. Saw [facility] 10/2020. Labs 8/2020 notable for AST 80 , normal BR, GGT 1718. Noted elevated LFTs since 2017. Negative ASMA, SENTHIL, AMA, Hepatitis A, B, C testing. US showed \"steatohepatitis with mild dilation of the bile ducts\". Saw for elevated LFTs that initially thought were related to DO, but later the patient disclosed 1.7 L of vodka every 5 days. Admission for [facility] 11/2020 with acute anemia - Hgb 5.1 - potentially this was variceal bleed given the severity with hematemesis and melena. Ethyl glucuronide markedly positive 11/2020.       Over the past year was drinking 1.75 L a week, prior to that would drink \"socially\" - 3 days a week - had 2-3 drinks at a time.      First told she had pancreatitis a few years ago. Has not seen GI recently for this, was previously seen at [facility] > 1 year ago for her pancreatitis. Has had a history of necrotizing pancreatitis and also with " pseudocyst formation.      She also has a history of breast cancer s/p lumpectomy and XRT. 1.2 cm low-grade DCIS, % positive with all negative margins  MD review date:   MD Decision for clinic consultation/Orders:            Referral updates/Patient contacted:

## 2021-08-28 ENCOUNTER — LAB (OUTPATIENT)
Dept: LAB | Facility: CLINIC | Age: 41
End: 2021-08-28
Payer: COMMERCIAL

## 2021-08-28 DIAGNOSIS — K70.31 ALCOHOLIC CIRRHOSIS OF LIVER WITH ASCITES (H): ICD-10-CM

## 2021-08-28 DIAGNOSIS — K86.1 CHRONIC PANCREATITIS, UNSPECIFIED PANCREATITIS TYPE (H): ICD-10-CM

## 2021-08-28 DIAGNOSIS — D64.9 ANEMIA, UNSPECIFIED TYPE: ICD-10-CM

## 2021-08-28 DIAGNOSIS — R10.84 ABDOMINAL PAIN, GENERALIZED: ICD-10-CM

## 2021-08-28 DIAGNOSIS — E10.9 TYPE 1 DIABETES MELLITUS WITHOUT COMPLICATION (H): ICD-10-CM

## 2021-08-28 LAB
AFP SERPL-MCNC: 6.3 UG/L (ref 0–8)
ERYTHROCYTE [DISTWIDTH] IN BLOOD BY AUTOMATED COUNT: 19.5 % (ref 10–15)
HBA1C MFR BLD: 7.8 % (ref 0–5.6)
HCT VFR BLD AUTO: 24 % (ref 35–47)
HGB BLD-MCNC: 7.7 G/DL (ref 11.7–15.7)
MCH RBC QN AUTO: 32.1 PG (ref 26.5–33)
MCHC RBC AUTO-ENTMCNC: 32.1 G/DL (ref 31.5–36.5)
MCV RBC AUTO: 100 FL (ref 78–100)
PLATELET # BLD AUTO: 120 10E3/UL (ref 150–450)
RBC # BLD AUTO: 2.4 10E6/UL (ref 3.8–5.2)
WBC # BLD AUTO: 8.1 10E3/UL (ref 4–11)

## 2021-08-28 PROCEDURE — 85610 PROTHROMBIN TIME: CPT

## 2021-08-28 PROCEDURE — 83690 ASSAY OF LIPASE: CPT

## 2021-08-28 PROCEDURE — 85027 COMPLETE CBC AUTOMATED: CPT

## 2021-08-28 PROCEDURE — 83036 HEMOGLOBIN GLYCOSYLATED A1C: CPT

## 2021-08-28 PROCEDURE — 83550 IRON BINDING TEST: CPT

## 2021-08-28 PROCEDURE — 82105 ALPHA-FETOPROTEIN SERUM: CPT

## 2021-08-28 PROCEDURE — 82728 ASSAY OF FERRITIN: CPT

## 2021-08-28 PROCEDURE — 80321 ALCOHOLS BIOMARKERS 1OR 2: CPT | Mod: 90

## 2021-08-28 PROCEDURE — 36415 COLL VENOUS BLD VENIPUNCTURE: CPT

## 2021-08-28 PROCEDURE — 80053 COMPREHEN METABOLIC PANEL: CPT

## 2021-08-30 ENCOUNTER — TELEPHONE (OUTPATIENT)
Dept: GASTROENTEROLOGY | Facility: CLINIC | Age: 41
End: 2021-08-30

## 2021-08-30 LAB
ALBUMIN SERPL-MCNC: 2.5 G/DL (ref 3.4–5)
ALP SERPL-CCNC: 108 U/L (ref 40–150)
ALT SERPL W P-5'-P-CCNC: 37 U/L (ref 0–50)
ANION GAP SERPL CALCULATED.3IONS-SCNC: 13 MMOL/L (ref 3–14)
AST SERPL W P-5'-P-CCNC: 126 U/L (ref 0–45)
BILIRUB SERPL-MCNC: 1.5 MG/DL (ref 0.2–1.3)
BUN SERPL-MCNC: 48 MG/DL (ref 7–30)
CALCIUM SERPL-MCNC: 7.1 MG/DL (ref 8.5–10.1)
CHLORIDE BLD-SCNC: 89 MMOL/L (ref 94–109)
CO2 SERPL-SCNC: 29 MMOL/L (ref 20–32)
CREAT SERPL-MCNC: 2.12 MG/DL (ref 0.52–1.04)
FERRITIN SERPL-MCNC: 55 NG/ML (ref 12–150)
GFR SERPL CREATININE-BSD FRML MDRD: 28 ML/MIN/1.73M2
GLUCOSE BLD-MCNC: 539 MG/DL (ref 70–99)
INR PPP: 1.73 (ref 0.85–1.15)
IRON SATN MFR SERPL: 58 % (ref 15–46)
IRON SERPL-MCNC: 124 UG/DL (ref 35–180)
LIPASE SERPL-CCNC: 42 U/L (ref 73–393)
POTASSIUM BLD-SCNC: 2.5 MMOL/L (ref 3.4–5.3)
PROT SERPL-MCNC: 6.8 G/DL (ref 6.8–8.8)
SODIUM SERPL-SCNC: 131 MMOL/L (ref 133–144)
TIBC SERPL-MCNC: 212 UG/DL (ref 240–430)

## 2021-08-30 NOTE — CONFIDENTIAL NOTE
DATE:  8/30/2021   TIME OF RECEIPT FROM LAB:  4 pm  LAB TEST:  Potasium   LAB VALUE:  2.5  RESULTS GIVEN WITH READ-BACK TO (PROVIDER): Dr. Wasserman    TIME LAB VALUE REPORTED TO PROVIDER:   4:03P    ----------  DATE:  8/30/2021   TIME OF RECEIPT FROM LAB:  4 pm  LAB TEST:  Glucose  LAB VALUE:  539  RESULTS GIVEN WITH READ-BACK TO (PROVIDER):  Dr. Wasserman    TIME LAB VALUE REPORTED TO PROVIDER:   4:03 p  ---------  Plan:  Dr. Wasserman recommending ED evaluation based on lab results also showing MARTIN.     Dr. Wasserman  tried reaching patient by phone, but got no answer.    Writer also tried to reach pt  by phone without success. left message for patient to check her mychart message RIANNA MEREDITH LPN  [facility]ology [facility]

## 2021-08-31 NOTE — TELEPHONE ENCOUNTER
Noted in care everywhere that patient is already inpatient at [facility] since 8/28/21. Pt presented to the emergency department for blood in stool, dizziness, and nausea.    Shelley MEREDITH LPN  Hepatology Clinic

## 2021-09-02 DIAGNOSIS — Z79.4 TYPE 2 DIABETES MELLITUS WITH HYPERGLYCEMIA, WITH LONG-TERM CURRENT USE OF INSULIN (H): ICD-10-CM

## 2021-09-02 DIAGNOSIS — E11.65 TYPE 2 DIABETES MELLITUS WITH HYPERGLYCEMIA, WITH LONG-TERM CURRENT USE OF INSULIN (H): ICD-10-CM

## 2021-09-02 RX ORDER — FLASH GLUCOSE SENSOR
1 KIT MISCELLANEOUS
Qty: 2 EACH | Refills: 3 | OUTPATIENT
Start: 2021-09-02

## 2021-09-07 LAB — PETH BLD-MCNC: 590 NG/ML

## 2021-09-08 ENCOUNTER — PATIENT OUTREACH (OUTPATIENT)
Dept: GASTROENTEROLOGY | Facility: CLINIC | Age: 41
End: 2021-09-08

## 2021-09-08 NOTE — PROGRESS NOTES
Called patient to discuss referral and recommendations from Dr Mack, left VM    I can see in clinic. If she's not very symptomatic we won't do anything.    Dagmar Medina, RN, BSN,   [facility]  Care coordinator

## 2021-09-16 ENCOUNTER — TELEPHONE (OUTPATIENT)
Dept: GASTROENTEROLOGY | Facility: CLINIC | Age: 41
End: 2021-09-16

## 2021-09-23 ENCOUNTER — PATIENT OUTREACH (OUTPATIENT)
Dept: GASTROENTEROLOGY | Facility: CLINIC | Age: 41
End: 2021-09-23

## 2021-09-23 NOTE — PROGRESS NOTES
Called pt to discuss referral, 2nd attempt to reach.  Left VM    Will send letter    Dagmar Medina RN, BSN,   [facility]  Care coordinator

## 2021-10-24 ENCOUNTER — HEALTH MAINTENANCE LETTER (OUTPATIENT)
Age: 41
End: 2021-10-24

## 2021-11-19 DIAGNOSIS — Z79.4 TYPE 2 DIABETES MELLITUS WITH HYPERGLYCEMIA, WITH LONG-TERM CURRENT USE OF INSULIN (H): ICD-10-CM

## 2021-11-19 DIAGNOSIS — E11.65 TYPE 2 DIABETES MELLITUS WITH HYPERGLYCEMIA, WITH LONG-TERM CURRENT USE OF INSULIN (H): ICD-10-CM

## 2021-11-19 RX ORDER — INSULIN ASPART 100 [IU]/ML
INJECTION, SOLUTION INTRAVENOUS; SUBCUTANEOUS
Qty: 15 ML | Refills: 11 | Status: SHIPPED | OUTPATIENT
Start: 2021-11-19

## 2021-11-19 NOTE — TELEPHONE ENCOUNTER
Routing refill request to provider for review/approval because:  Patient due for MD follow up. Last office visit: 09/25/2020.  Patient NO SHOWED appointment: 10/06/2021.  Recently hospitalized: 10/31/2021 - Hematemesis    Neo MASTERS RN [facility]

## 2021-12-14 DIAGNOSIS — Z79.4 TYPE 2 DIABETES MELLITUS WITH HYPERGLYCEMIA, WITH LONG-TERM CURRENT USE OF INSULIN (H): ICD-10-CM

## 2021-12-14 DIAGNOSIS — E11.65 TYPE 2 DIABETES MELLITUS WITH HYPERGLYCEMIA, WITH LONG-TERM CURRENT USE OF INSULIN (H): ICD-10-CM

## 2021-12-15 RX ORDER — FLASH GLUCOSE SENSOR
KIT MISCELLANEOUS
Qty: 2 EACH | Refills: 1 | Status: SHIPPED | OUTPATIENT
Start: 2021-12-15 | End: 2022-01-05

## 2021-12-15 NOTE — TELEPHONE ENCOUNTER
Signed Prescriptions:                        Disp   Refills    Continuous Blood Gluc Sensor (FREESTYLE LI*2 each 1        Sig: CHANGE EVERY 14 DAYS. KEEP YOUR FOLLOW UP APPT  Authorizing Provider: BRISA DEJESUS  Ordering User: MICHELLE VICTOR    Medication is being filled for 1 time refill only due to:  Patient needs to be seen because overdue, has upcoming appt next month.     Michelle Victor RN

## 2021-12-20 ENCOUNTER — VIRTUAL VISIT (OUTPATIENT)
Dept: FAMILY MEDICINE | Facility: CLINIC | Age: 41
End: 2021-12-20
Payer: COMMERCIAL

## 2021-12-20 DIAGNOSIS — R10.13 ABDOMINAL PAIN, EPIGASTRIC: ICD-10-CM

## 2021-12-20 DIAGNOSIS — K86.3 INFECTED PANCREATIC PSEUDOCYST: Primary | ICD-10-CM

## 2021-12-20 PROCEDURE — 99213 OFFICE O/P EST LOW 20 MIN: CPT | Mod: 95 | Performed by: INTERNAL MEDICINE

## 2021-12-20 RX ORDER — OXYCODONE HYDROCHLORIDE 5 MG/1
5 TABLET ORAL 2 TIMES DAILY PRN
Qty: 10 TABLET | Refills: 0 | Status: SHIPPED | OUTPATIENT
Start: 2021-12-20 | End: 2021-12-25

## 2021-12-20 NOTE — PROGRESS NOTES
Zulay is a 41 year old who is being evaluated via a billable telephone visit.      What phone number would you like to be contacted at? [phone]  How would you like to obtain your AVS? MyChart    Assessment & Plan     Infected pancreatic pseudocyst  Was admitted to [facility] on the seventh and discharged on the 13th  She had fever and abdominal pain  Was found to have infected pancreatic pseudocyst  Initially her white cell count was as high as 34,000  It slowly came down  She was treated with Zosyn and also the cyst was drained  She currently has a stent in place  She needs a repeat CT abdomen and stent replacement done again  She is going to schedule that with [facility]  Currently she has no fevers or chills  Still continuing Augmentin  She also had some transfusion when she was in the hospital as her hemoglobin dropped but there was no evidence of any NV bleeding or hematemesis  This could be most probably from hemodilution    Abdominal pain, epigastric  She was discharged on oxycodone  She cannot take Tylenol because of the cirrhosis  She also cannot take any NSAIDs due to high bleeding risk  She continues to have abdominal pain although it is getting better  I told her that we can do some oxycodone but she should use this very sparingly and I will not be giving any more repeat prescriptions because of the risk of addiction  She is a recovering alcoholic and we should be cautious with using oxycodone in her case  Work note given  - oxyCODONE (ROXICODONE) 5 MG tablet; Take 1 tablet (5 mg) by mouth 2 times daily as needed for pain      20 minutes spent on the date of the encounter doing chart review, history and exam, documentation and further activities per the note           Return in about 3 months (around 3/20/2022).    Jarred Cavanaugh MD  [facility]   Zulay is a 41 year old who presents for the following health issues     HPI   Requesting oxycodone  prescription/work note  Was recently admitted to the hospital for infected pancreatic pseudocyst  Currently on oral Augmentin          Review of Systems   Constitutional, HEENT, cardiovascular, pulmonary, gi and gu systems are negative, except as otherwise noted.      Objective           Vitals:  No vitals were obtained today due to virtual visit.    Physical Exam   healthy, alert and no distress  PSYCH: Alert and oriented times 3; coherent speech, normal   rate and volume, able to articulate logical thoughts, able   to abstract reason, no tangential thoughts, no hallucinations   or delusions  Her affect is normal  RESP: No cough, no audible wheezing, able to talk in full sentences  Remainder of exam unable to be completed due to telephone visits                Phone call duration: 14 minutes

## 2021-12-20 NOTE — PATIENT INSTRUCTIONS
Patient Education     About Pancreatic Cysts  What are pancreatic cysts?  Pancreatic cysts are the second-most common growth in the pancreas. They are sometimes called cystic neoplasms [SIS-tik WNI-ww-emzp-imz] of   the pancreas.  Most cysts are benign (not cancer). But some can turn into cancer over time.   Diagnosis  To learn which type of cyst you have, we may need to do an imaging test, or scan (such as a CT, an MRI or an endoscopic ultrasound). Your care team will explain your test in details.   During your test, your doctor will view the cyst on a screen. He or she may then do a biopsy (use a needle to take a small bit of liquid from the cyst for testing).   General care and treatment  Your treatment will depend on:    The type of cyst    The size of the cyst    Where it is found    Whether you have symptoms.   We describe three types of cysts below.  Serous cysts   Serous [SER-us] cysts are usually:    Found in the head of the pancreas    Benign (harmless)     Filled with thin, watery liquid     Have almost no chance of turning into cancer    Found in older patients.  Symptoms  You may have:    Belly pain    Weight loss (less likely)     Jaundice, or yellowing of the skin and eyes (less likely).  Treatment  Treatment will depend on the size of the cyst and whether you have symptoms. Most people don't need surgery. Your care team may suggest surgery if:    The cyst might be cancer    The cyst is causing symptoms    The cyst is 3 centimeters (1.2 inches)   or larger.  Mucinous cysts   Mucinous [MEW-si-nus] cysts are usually:    Found in the body and tail of the pancreas    Found in middle-aged women    Filled with thick mucus instead of thin, watery liquid    Can turn into cancer.  Symptoms  About half the people with mucinous cysts have some belly pain. Many people have no symptoms.   Treatment  These cysts require surgery. They must be removed.   If they are not, they may turn into cancer.   If your cyst is  taken out before it becomes cancer, you will be cured.  Intraductal papillary mucinous neoplasms   (IPMNs)  Intraductal papillary [IN-[facility]-duk-tuhl pap-i-LAYR-ee] cysts:    Grow in the ducts of the pancreas    Are found in both men and women    Are found more often in people as they   get older    Can turn into cancer, depending on the size and location of the cyst.  The type of IPMN depends on which part of the duct it grows in:    Main duct    Side branch or branch duct    Mixed duct.  Symptoms  Some people have no symptoms. They learn they have an IPMN when they have an imaging test for another reason. When they do have symptoms, most people report:    Belly pain    Jaundice (yellowing of the skin and eyes)    Pancreatitis (swollen, sore pancreas)    Weight loss    Appetite loss    Light-colored stools.  Treatment  Treatment will depend on:    The size and location of the cyst    Whether you have symptoms     What the symptoms are.  If you have a small IPMN in the branch duct, your care team may want to watch it for several years, to be sure it doesn't get bigger. If you have a large IPMN (more than 3 centimeters or 1.2 inches), you may need surgery to remove the cyst.   For informational purposes only. Not to replace the advice of your health care provider.   Developed in collaboration with [facility]. Copyright   2011 [employer].   All rights reserved. [employer] [id number] - REV 12/17.

## 2021-12-20 NOTE — LETTER
[address] [address]  Phone: [phone]    December 20, 2021        Zulay K Flo  [address] [place] [address]          To whom it may concern:    RE: Zulay Rossi    Patient was seen and treated today at our clinic.  Patient may return to work on 12/16/2021 with the following:  No restrictions    Please contact me for questions or concerns.      Sincerely,        Jarred Cavanaugh MD

## 2022-01-05 ENCOUNTER — ONCOLOGY VISIT (OUTPATIENT)
Dept: ONCOLOGY | Facility: CLINIC | Age: 42
End: 2022-01-05
Payer: COMMERCIAL

## 2022-01-05 VITALS
HEART RATE: 96 BPM | DIASTOLIC BLOOD PRESSURE: 73 MMHG | WEIGHT: 101 LBS | SYSTOLIC BLOOD PRESSURE: 117 MMHG | OXYGEN SATURATION: 99 % | BODY MASS INDEX: 18.58 KG/M2 | HEIGHT: 62 IN

## 2022-01-05 DIAGNOSIS — M79.89 RIGHT AXILLARY SWELLING: ICD-10-CM

## 2022-01-05 DIAGNOSIS — Z12.31 ENCOUNTER FOR SCREENING MAMMOGRAM FOR BREAST CANCER: Primary | ICD-10-CM

## 2022-01-05 PROCEDURE — 99214 OFFICE O/P EST MOD 30 MIN: CPT | Performed by: INTERNAL MEDICINE

## 2022-01-05 ASSESSMENT — MIFFLIN-ST. JEOR: SCORE: 1081.38

## 2022-01-05 ASSESSMENT — PAIN SCALES - GENERAL: PAINLEVEL: MILD PAIN (3)

## 2022-01-05 NOTE — NURSING NOTE
"Oncology Rooming Note    January 5, 2022 3:34 PM   Zulay Rossi is a 41 year old female who presents for:    No chief complaint on file.    Initial Vitals: /73 (BP Location: Left arm, Patient Position: Chair, Cuff Size: Adult Regular)   Pulse 96   Ht 1.575 m (5' 2\")   Wt 45.8 kg (101 lb)   SpO2 99%   BMI 18.47 kg/m   Estimated body mass index is 18.47 kg/m  as calculated from the following:    Height as of this encounter: 1.575 m (5' 2\").    Weight as of this encounter: 45.8 kg (101 lb). Body surface area is 1.42 meters squared.  Mild Pain (3) Comment: Data Unavailable   No LMP recorded. (Menstrual status: UNKNOWN).  Allergies reviewed: Yes  Medications reviewed: Yes    Medications: Medication refills not needed today.  Pharmacy name entered into StatSheet:    [id number] [address] [facility] [address] [facility]  WRITTEN PRESCRIPTION REQUESTED  [facility] [place], [place] - [phone]    Clinical concerns: Fullness in right axilla/breast area; some weakness and pain in right arm - using heat packs and aspercream     Dr. Brennan was notified.      Paula Fisher CMA                "
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
## 2022-01-07 DIAGNOSIS — E11.65 TYPE 2 DIABETES MELLITUS WITH HYPERGLYCEMIA, WITH LONG-TERM CURRENT USE OF INSULIN (H): ICD-10-CM

## 2022-01-07 DIAGNOSIS — Z79.4 TYPE 2 DIABETES MELLITUS WITH HYPERGLYCEMIA, WITH LONG-TERM CURRENT USE OF INSULIN (H): ICD-10-CM

## 2022-01-07 RX ORDER — INSULIN GLARGINE 100 [IU]/ML
INJECTION, SOLUTION SUBCUTANEOUS
Qty: 15 ML | Refills: 1 | OUTPATIENT
Start: 2022-01-07

## 2022-01-07 RX ORDER — INSULIN GLARGINE 100 [IU]/ML
INJECTION, SOLUTION SUBCUTANEOUS
Qty: 15 ML | Refills: 1 | Status: SHIPPED | OUTPATIENT
Start: 2022-01-07

## 2022-01-07 NOTE — TELEPHONE ENCOUNTER
Patient overdue for endocrine visit. It has been >1 year since last visit. Since Dr. Mtz is leaving [facility], request is being forwarded to patient's PCP.    Agustin BARNETT RN....1/7/2022 10:41 AM

## 2022-01-12 ENCOUNTER — TELEPHONE (OUTPATIENT)
Dept: FAMILY MEDICINE | Facility: CLINIC | Age: 42
End: 2022-01-12
Payer: COMMERCIAL

## 2022-02-13 ENCOUNTER — HEALTH MAINTENANCE LETTER (OUTPATIENT)
Age: 42
End: 2022-02-13

## 2022-02-17 PROBLEM — K86.1 IDIOPATHIC CHRONIC PANCREATITIS (H): Status: RESOLVED | Noted: 2017-11-02 | Resolved: 2020-08-28

## 2022-04-10 ENCOUNTER — HEALTH MAINTENANCE LETTER (OUTPATIENT)
Age: 42
End: 2022-04-10

## 2022-10-15 ENCOUNTER — HEALTH MAINTENANCE LETTER (OUTPATIENT)
Age: 42
End: 2022-10-15

## 2023-03-26 ENCOUNTER — HEALTH MAINTENANCE LETTER (OUTPATIENT)
Age: 43
End: 2023-03-26

## 2023-04-03 PROBLEM — K85.90 ACUTE PANCREATITIS WITHOUT NECROSIS OR INFECTION, UNSPECIFIED: Status: RESOLVED | Noted: 2017-06-02 | Resolved: 2017-11-02

## 2023-04-03 NOTE — TELEPHONE ENCOUNTER
Controlled Substance Refill Request for percocet  Problem List Complete:  No      PROVIDER TO CONSIDER COMPLETION OF PROBLEM LIST AND OVERVIEW/CONTROLLED SUBSTANCE AGREEMENT     Last Written Prescription Date:  11/28/17  Last Fill Quantity: 60,   # refills: 0     [place] Office [place] [facility] [place]: 11/2/17     Future Office visit:      Controlled substance agreement on file: Yes:  Date 4/18/17.      Processing:  Patient will  in clinic      checked in past 6 months?  Yes 11/28/17   Barbara Overton RN   No

## 2023-06-01 ENCOUNTER — HEALTH MAINTENANCE LETTER (OUTPATIENT)
Age: 43
End: 2023-06-01
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
## 2024-01-07 ENCOUNTER — HEALTH MAINTENANCE LETTER (OUTPATIENT)
Age: 44
End: 2024-01-07

## 2024-03-17 ENCOUNTER — HEALTH MAINTENANCE LETTER (OUTPATIENT)
Age: 44
End: 2024-03-17

## 2024-05-26 ENCOUNTER — HEALTH MAINTENANCE LETTER (OUTPATIENT)
Age: 44
End: 2024-05-26

## 2024-06-17 PROBLEM — Z76.89 HEALTH CARE HOME: Status: RESOLVED | Noted: 2017-05-02 | Resolved: 2024-06-17

## 2024-08-04 ENCOUNTER — HEALTH MAINTENANCE LETTER (OUTPATIENT)
Age: 44
End: 2024-08-04

## (undated) DEVICE — BLADE KNIFE SURG 15 371115

## (undated) DEVICE — LINEN TOWEL PACK X5 5464

## (undated) DEVICE — SU VICRYL 3-0 SH 27" UND J416H

## (undated) DEVICE — SYR 05ML LL W/O NDL

## (undated) DEVICE — PREP CHLORAPREP 26ML TINTED ORANGE  260815

## (undated) DEVICE — NDL 15GA 1.5" 8881200029

## (undated) DEVICE — SPECIMEN CONTAINER 5OZ STERILE 2600SA

## (undated) DEVICE — CLIP HORIZON MED BLUE 002200

## (undated) DEVICE — NDL 30GA 0.5" 305106

## (undated) DEVICE — DRAPE STERI INCISE 1050

## (undated) DEVICE — SYR 10ML FINGER CONTROL W/O NDL 309695

## (undated) DEVICE — SOL NACL 0.9% IRRIG 500ML BOTTLE 2F7123

## (undated) DEVICE — DRSG STERI STRIP 1/2X4" R1547

## (undated) DEVICE — ESU PENCIL SMOKE EVAC W/ROCKER SWITCH 0703-047-000

## (undated) DEVICE — CLIP HORIZON SM RED WIDE SLOT 001201

## (undated) DEVICE — PACK MINOR CUSTOM ASC

## (undated) DEVICE — GLOVE PROTEXIS MICRO 5.5  2D73PM55

## (undated) DEVICE — BASIN EMESIS STERILE  SSK9005A

## (undated) DEVICE — DRAPE U SPLIT 74X120" 29440

## (undated) DEVICE — ESU ELEC BLADE 2.75" COATED/INSULATED E1455

## (undated) DEVICE — GLOVE PROTEXIS BLUE W/NEU-THERA 6.0  2D73EB60

## (undated) DEVICE — STRAP KNEE/BODY 31143004

## (undated) DEVICE — ESU GROUND PAD ADULT W/CORD E7507

## (undated) DEVICE — DRSG PRIMAPORE 03 1/8X6" 66000318

## (undated) DEVICE — SU MONOCRYL 4-0 P-3 18" UND Y494G

## (undated) DEVICE — SOL WATER IRRIG 1000ML BOTTLE 2F7114

## (undated) RX ORDER — DEXAMETHASONE SODIUM PHOSPHATE 4 MG/ML
INJECTION, SOLUTION INTRA-ARTICULAR; INTRALESIONAL; INTRAMUSCULAR; INTRAVENOUS; SOFT TISSUE
Status: DISPENSED
Start: 2020-10-22

## (undated) RX ORDER — ISOSULFAN BLUE 50 MG/5ML
INJECTION, SOLUTION SUBCUTANEOUS
Status: DISPENSED
Start: 2020-10-22

## (undated) RX ORDER — ONDANSETRON 2 MG/ML
INJECTION INTRAMUSCULAR; INTRAVENOUS
Status: DISPENSED
Start: 2020-10-22

## (undated) RX ORDER — GABAPENTIN 300 MG/1
CAPSULE ORAL
Status: DISPENSED
Start: 2020-10-22

## (undated) RX ORDER — CEFAZOLIN SODIUM 1 G/3ML
INJECTION, POWDER, FOR SOLUTION INTRAMUSCULAR; INTRAVENOUS
Status: DISPENSED
Start: 2020-10-22

## (undated) RX ORDER — EPHEDRINE SULFATE 50 MG/ML
INJECTION, SOLUTION INTRAMUSCULAR; INTRAVENOUS; SUBCUTANEOUS
Status: DISPENSED
Start: 2020-10-22

## (undated) RX ORDER — BUPIVACAINE HYDROCHLORIDE 2.5 MG/ML
INJECTION, SOLUTION EPIDURAL; INFILTRATION; INTRACAUDAL
Status: DISPENSED
Start: 2020-10-22

## (undated) RX ORDER — LIDOCAINE HYDROCHLORIDE 20 MG/ML
INJECTION, SOLUTION EPIDURAL; INFILTRATION; INTRACAUDAL; PERINEURAL
Status: DISPENSED
Start: 2020-10-22

## (undated) RX ORDER — KETOROLAC TROMETHAMINE 30 MG/ML
INJECTION, SOLUTION INTRAMUSCULAR; INTRAVENOUS
Status: DISPENSED
Start: 2020-10-22

## (undated) RX ORDER — LIDOCAINE HYDROCHLORIDE AND EPINEPHRINE 10; 10 MG/ML; UG/ML
INJECTION, SOLUTION INFILTRATION; PERINEURAL
Status: DISPENSED
Start: 2020-10-22

## (undated) RX ORDER — EPINEPHRINE 1 MG/ML
INJECTION, SOLUTION, CONCENTRATE INTRAVENOUS
Status: DISPENSED
Start: 2020-10-22

## (undated) RX ORDER — PROPOFOL 10 MG/ML
INJECTION, EMULSION INTRAVENOUS
Status: DISPENSED
Start: 2020-10-22

## (undated) RX ORDER — FENTANYL CITRATE 50 UG/ML
INJECTION, SOLUTION INTRAMUSCULAR; INTRAVENOUS
Status: DISPENSED
Start: 2020-10-22